# Patient Record
Sex: FEMALE | Race: WHITE | NOT HISPANIC OR LATINO | Employment: UNEMPLOYED | URBAN - METROPOLITAN AREA
[De-identification: names, ages, dates, MRNs, and addresses within clinical notes are randomized per-mention and may not be internally consistent; named-entity substitution may affect disease eponyms.]

---
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
## 2019-01-16 ENCOUNTER — OFFICE VISIT (OUTPATIENT)
Dept: OBGYN CLINIC | Facility: CLINIC | Age: 19
End: 2019-01-16
Payer: COMMERCIAL

## 2019-01-16 VITALS — DIASTOLIC BLOOD PRESSURE: 66 MMHG | SYSTOLIC BLOOD PRESSURE: 118 MMHG | BODY MASS INDEX: 26.19 KG/M2 | WEIGHT: 150.2 LBS

## 2019-01-16 DIAGNOSIS — Z01.419 ENCOUNTER FOR GYNECOLOGICAL EXAMINATION (GENERAL) (ROUTINE) WITHOUT ABNORMAL FINDINGS: Primary | ICD-10-CM

## 2019-01-16 DIAGNOSIS — Z30.011 ORAL CONTRACEPTION INITIATION: ICD-10-CM

## 2019-01-16 PROCEDURE — 99395 PREV VISIT EST AGE 18-39: CPT | Performed by: PHYSICIAN ASSISTANT

## 2019-01-16 RX ORDER — NORETHINDRONE ACETATE AND ETHINYL ESTRADIOL AND FERROUS FUMARATE 1MG-20(24)
1 KIT ORAL DAILY
Qty: 28 TABLET | Refills: 2 | Status: SHIPPED | OUTPATIENT
Start: 2019-01-16 | End: 2019-04-04 | Stop reason: SDUPTHER

## 2019-01-16 NOTE — ASSESSMENT & PLAN NOTE
Annual exam performed  Discussed OCP vs depo vs nuvaring vs nexplanon vs IUD  Pt wants OCP,  Risks, benefits and possible side effects discussed  Instructions reviewed    RTO 3 months for OCP/BP check

## 2019-01-16 NOTE — PROGRESS NOTES
Assessment/Plan  Problem List Items Addressed This Visit     Encounter for gynecological examination (general) (routine) without abnormal findings - Primary     Annual exam performed  Discussed OCP vs depo vs nuvaring vs nexplanon vs IUD  Pt wants OCP,  Risks, benefits and possible side effects discussed  Instructions reviewed  RTO 3 months for OCP/BP check           Other Visit Diagnoses     Oral contraception initiation        Relevant Medications    Norethin Ace-Eth Estrad-FE (MINASTRIN 24 FE) 1-20 MG-MCG(24) CHEW        Subjective   Thuy Woods is a 22 y o  female who presents for a new patient annual GYN exam   Periods are regular every 28-30 days  Dysmenorrhea:severe, occurring throughout menses  She denies intermenstrual bleeding, spotting, or discharge  She reports that she is sexually active with 1 partner and using condoms for contraception  Patient requests OCP to help with dysmenorrhea    Regular self breast exam: no    Family history of uterine or ovarian cancer: no  Family history of breast cancer: yes - MGGM > 50   Family history of colon cancer: no    Menstrual History:  OB History      Para Term  AB Living    0 0 0 0 0 0    SAB TAB Ectopic Multiple Live Births    0 0 0 0 0         Patient's last menstrual period was 01/15/2019  Period Pattern: Regular  Menstrual Flow: Moderate, Heavy    Past Medical History:   Diagnosis Date    Allergic rhinitis     Eczema      History reviewed  No pertinent surgical history  Family History   Problem Relation Age of Onset    No Known Problems Mother     No Known Problems Father     Mental illness Neg Hx     Substance Abuse Neg Hx        Review of Systems  Review of Systems   Constitutional: Negative for chills and fever  Respiratory: Negative for shortness of breath  Cardiovascular: Negative for chest pain  Gastrointestinal: Negative for abdominal pain  Genitourinary: Positive for menstrual problem (see HPI)   Negative for dysuria, pelvic pain, vaginal bleeding, vaginal discharge and vaginal pain  Negative for breast pain or lumps  Negative for stress urinary incontinence  Neurological: Negative for headaches  Objective   /66 (BP Location: Left arm)   Wt 68 1 kg (150 lb 3 2 oz)   LMP 01/15/2019   BMI 26 19 kg/m²     Physical Exam   Constitutional: She is oriented to person, place, and time  She appears well-developed and well-nourished  Neck: No thyromegaly present  Cardiovascular: Normal rate and regular rhythm  Pulmonary/Chest: Effort normal and breath sounds normal  Right breast exhibits no mass, no nipple discharge, no skin change and no tenderness  Left breast exhibits no mass, no nipple discharge, no skin change and no tenderness  Abdominal: Soft  There is no tenderness  There is no rebound and no guarding  Neurological: She is alert and oriented to person, place, and time  Psychiatric: She has a normal mood and affect  Nursing note and vitals reviewed

## 2019-04-03 ENCOUNTER — OFFICE VISIT (OUTPATIENT)
Dept: URGENT CARE | Age: 19
End: 2019-04-03
Payer: COMMERCIAL

## 2019-04-03 VITALS
HEART RATE: 84 BPM | HEIGHT: 63 IN | RESPIRATION RATE: 18 BRPM | DIASTOLIC BLOOD PRESSURE: 73 MMHG | TEMPERATURE: 97.7 F | BODY MASS INDEX: 25.16 KG/M2 | OXYGEN SATURATION: 100 % | SYSTOLIC BLOOD PRESSURE: 115 MMHG | WEIGHT: 142 LBS

## 2019-04-03 DIAGNOSIS — J20.9 ACUTE BRONCHITIS, UNSPECIFIED ORGANISM: ICD-10-CM

## 2019-04-03 DIAGNOSIS — R06.02 SHORTNESS OF BREATH: Primary | ICD-10-CM

## 2019-04-03 PROCEDURE — S9088 SERVICES PROVIDED IN URGENT: HCPCS | Performed by: FAMILY MEDICINE

## 2019-04-03 PROCEDURE — 99213 OFFICE O/P EST LOW 20 MIN: CPT | Performed by: FAMILY MEDICINE

## 2019-04-03 PROCEDURE — 94640 AIRWAY INHALATION TREATMENT: CPT | Performed by: FAMILY MEDICINE

## 2019-04-03 RX ORDER — BENZONATATE 100 MG/1
100 CAPSULE ORAL 3 TIMES DAILY PRN
Qty: 20 CAPSULE | Refills: 0 | Status: SHIPPED | OUTPATIENT
Start: 2019-04-03 | End: 2019-04-18

## 2019-04-03 RX ORDER — IPRATROPIUM BROMIDE AND ALBUTEROL SULFATE 2.5; .5 MG/3ML; MG/3ML
3 SOLUTION RESPIRATORY (INHALATION) ONCE
Status: DISCONTINUED | OUTPATIENT
Start: 2019-04-03 | End: 2019-04-03

## 2019-04-03 RX ORDER — ALBUTEROL SULFATE 2.5 MG/3ML
2.5 SOLUTION RESPIRATORY (INHALATION) ONCE
Status: COMPLETED | OUTPATIENT
Start: 2019-04-03 | End: 2019-04-03

## 2019-04-03 RX ORDER — PROMETHAZINE HYDROCHLORIDE AND CODEINE PHOSPHATE 6.25; 1 MG/5ML; MG/5ML
5 SYRUP ORAL 3 TIMES DAILY PRN
Qty: 60 ML | Refills: 0 | Status: SHIPPED | OUTPATIENT
Start: 2019-04-03 | End: 2019-04-18

## 2019-04-03 RX ADMIN — ALBUTEROL SULFATE 2.5 MG: 2.5 SOLUTION RESPIRATORY (INHALATION) at 17:33

## 2019-04-03 RX ADMIN — Medication 0.5 MG: at 17:33

## 2019-04-04 DIAGNOSIS — Z30.011 ORAL CONTRACEPTION INITIATION: ICD-10-CM

## 2019-04-04 RX ORDER — NORETHINDRONE ACETATE AND ETHINYL ESTRADIOL AND FERROUS FUMARATE 1MG-20(24)
1 KIT ORAL DAILY
Qty: 28 TABLET | Refills: 0 | Status: SHIPPED | OUTPATIENT
Start: 2019-04-04 | End: 2019-04-18 | Stop reason: SDUPTHER

## 2019-04-18 ENCOUNTER — OFFICE VISIT (OUTPATIENT)
Dept: OBGYN CLINIC | Facility: CLINIC | Age: 19
End: 2019-04-18
Payer: COMMERCIAL

## 2019-04-18 VITALS — SYSTOLIC BLOOD PRESSURE: 116 MMHG | DIASTOLIC BLOOD PRESSURE: 70 MMHG | BODY MASS INDEX: 25.51 KG/M2 | WEIGHT: 144 LBS

## 2019-04-18 DIAGNOSIS — Z30.011 ORAL CONTRACEPTION INITIATION: ICD-10-CM

## 2019-04-18 PROCEDURE — 99212 OFFICE O/P EST SF 10 MIN: CPT | Performed by: PHYSICIAN ASSISTANT

## 2019-04-18 RX ORDER — NORETHINDRONE ACETATE AND ETHINYL ESTRADIOL AND FERROUS FUMARATE 1MG-20(24)
1 KIT ORAL DAILY
Qty: 84 TABLET | Refills: 2 | Status: SHIPPED | OUTPATIENT
Start: 2019-04-18 | End: 2019-10-16 | Stop reason: SDUPTHER

## 2019-04-30 ENCOUNTER — OFFICE VISIT (OUTPATIENT)
Dept: URGENT CARE | Age: 19
End: 2019-04-30
Payer: COMMERCIAL

## 2019-04-30 VITALS
DIASTOLIC BLOOD PRESSURE: 77 MMHG | HEART RATE: 90 BPM | RESPIRATION RATE: 20 BRPM | BODY MASS INDEX: 25.52 KG/M2 | HEIGHT: 63 IN | WEIGHT: 144 LBS | TEMPERATURE: 98.3 F | OXYGEN SATURATION: 99 % | SYSTOLIC BLOOD PRESSURE: 122 MMHG

## 2019-04-30 DIAGNOSIS — J20.8 ACUTE BRONCHITIS DUE TO OTHER SPECIFIED ORGANISMS: Primary | ICD-10-CM

## 2019-04-30 PROCEDURE — S9088 SERVICES PROVIDED IN URGENT: HCPCS | Performed by: FAMILY MEDICINE

## 2019-04-30 PROCEDURE — 99213 OFFICE O/P EST LOW 20 MIN: CPT | Performed by: FAMILY MEDICINE

## 2019-04-30 RX ORDER — AZITHROMYCIN 250 MG/1
TABLET, FILM COATED ORAL
Qty: 6 TABLET | Refills: 0 | Status: SHIPPED | OUTPATIENT
Start: 2019-04-30 | End: 2019-05-04

## 2019-08-29 ENCOUNTER — HOSPITAL ENCOUNTER (EMERGENCY)
Facility: HOSPITAL | Age: 19
Discharge: HOME | End: 2019-08-29
Attending: EMERGENCY MEDICINE | Admitting: EMERGENCY MEDICINE
Payer: COMMERCIAL

## 2019-08-29 ENCOUNTER — OFFICE VISIT (OUTPATIENT)
Dept: URGENT CARE | Age: 19
End: 2019-08-29
Payer: COMMERCIAL

## 2019-08-29 VITALS
HEART RATE: 64 BPM | OXYGEN SATURATION: 99 % | DIASTOLIC BLOOD PRESSURE: 52 MMHG | HEIGHT: 63 IN | RESPIRATION RATE: 16 BRPM | BODY MASS INDEX: 23.39 KG/M2 | TEMPERATURE: 98.1 F | SYSTOLIC BLOOD PRESSURE: 96 MMHG | WEIGHT: 132 LBS

## 2019-08-29 VITALS
WEIGHT: 132 LBS | OXYGEN SATURATION: 99 % | BODY MASS INDEX: 23.39 KG/M2 | TEMPERATURE: 97.8 F | RESPIRATION RATE: 20 BRPM | HEART RATE: 77 BPM | HEIGHT: 63 IN | SYSTOLIC BLOOD PRESSURE: 127 MMHG | DIASTOLIC BLOOD PRESSURE: 80 MMHG

## 2019-08-29 DIAGNOSIS — R51.9 NONINTRACTABLE HEADACHE, UNSPECIFIED CHRONICITY PATTERN, UNSPECIFIED HEADACHE TYPE: Primary | ICD-10-CM

## 2019-08-29 LAB
EXT PREG TEST URINE: NEGATIVE
EXT. CONTROL ED NAV: NORMAL

## 2019-08-29 PROCEDURE — 96375 TX/PRO/DX INJ NEW DRUG ADDON: CPT

## 2019-08-29 PROCEDURE — 99283 EMERGENCY DEPT VISIT LOW MDM: CPT

## 2019-08-29 PROCEDURE — 81025 URINE PREGNANCY TEST: CPT | Performed by: EMERGENCY MEDICINE

## 2019-08-29 PROCEDURE — 96365 THER/PROPH/DIAG IV INF INIT: CPT

## 2019-08-29 PROCEDURE — 99284 EMERGENCY DEPT VISIT MOD MDM: CPT | Performed by: EMERGENCY MEDICINE

## 2019-08-29 RX ORDER — MAGNESIUM SULFATE HEPTAHYDRATE 40 MG/ML
2 INJECTION, SOLUTION INTRAVENOUS ONCE
Status: COMPLETED | OUTPATIENT
Start: 2019-08-29 | End: 2019-08-29

## 2019-08-29 RX ORDER — DIPHENHYDRAMINE HYDROCHLORIDE 50 MG/ML
25 INJECTION INTRAMUSCULAR; INTRAVENOUS ONCE
Status: DISCONTINUED | OUTPATIENT
Start: 2019-08-29 | End: 2019-08-29

## 2019-08-29 RX ORDER — KETOROLAC TROMETHAMINE 30 MG/ML
15 INJECTION, SOLUTION INTRAMUSCULAR; INTRAVENOUS ONCE
Status: COMPLETED | OUTPATIENT
Start: 2019-08-29 | End: 2019-08-29

## 2019-08-29 RX ORDER — METOCLOPRAMIDE HYDROCHLORIDE 5 MG/ML
10 INJECTION INTRAMUSCULAR; INTRAVENOUS ONCE
Status: COMPLETED | OUTPATIENT
Start: 2019-08-29 | End: 2019-08-29

## 2019-08-29 RX ORDER — DEXAMETHASONE SODIUM PHOSPHATE 4 MG/ML
10 INJECTION, SOLUTION INTRA-ARTICULAR; INTRALESIONAL; INTRAMUSCULAR; INTRAVENOUS; SOFT TISSUE ONCE
Status: COMPLETED | OUTPATIENT
Start: 2019-08-29 | End: 2019-08-29

## 2019-08-29 RX ADMIN — SODIUM CHLORIDE 1000 ML: 0.9 INJECTION, SOLUTION INTRAVENOUS at 16:39

## 2019-08-29 RX ADMIN — DEXAMETHASONE SODIUM PHOSPHATE 10 MG: 4 INJECTION, SOLUTION INTRAMUSCULAR; INTRAVENOUS at 16:43

## 2019-08-29 RX ADMIN — KETOROLAC TROMETHAMINE 15 MG: 30 INJECTION, SOLUTION INTRAMUSCULAR at 16:39

## 2019-08-29 RX ADMIN — MAGNESIUM SULFATE HEPTAHYDRATE 2 G: 40 INJECTION, SOLUTION INTRAVENOUS at 16:51

## 2019-08-29 RX ADMIN — METOCLOPRAMIDE 10 MG: 5 INJECTION, SOLUTION INTRAMUSCULAR; INTRAVENOUS at 16:45

## 2019-08-29 NOTE — ED ATTENDING ATTESTATION
Julia Castillo DO, saw and evaluated the patient  I have discussed the patient with the resident/non-physician practitioner and agree with the resident's/non-physician practitioner's findings, Plan of Care, and MDM as documented in the resident's/non-physician practitioner's note, except where noted  All available labs and Radiology studies were reviewed  I was present for key portions of any procedure(s) performed by the resident/non-physician practitioner and I was immediately available to provide assistance  At this point I agree with the current assessment done in the Emergency Department  I have conducted an independent evaluation of this patient a history and physical is as follows:    21 yo female presents for evaluation of HA x 1 week, constant but waxes and wanes  Dull, L sided  Denies photophobia  Rated 6/10, no a/e factors  Denies associated f/c/n/v, neck pain/stiffness, visual or speech changes, focal weakness/numbness/tingling  Tried tylenol a couple days ago which didn't help much so she hasn't tried anything else  Neck supple no meningismus  Normal gait  Imp: HA likely migraine variant plan: tx sx, reassess          Critical Care Time  Procedures

## 2019-08-29 NOTE — PROGRESS NOTES
[facility]        NAME: Calvert Lefort is a 22 y o  female  : 2000    MRN: [id number]  DATE: 2019  TIME: 4:09 PM    Assessment and Plan   Nonintractable headache, unspecified chronicity pattern, unspecified headache type [R51]  1  Nonintractable headache, unspecified chronicity pattern, unspecified headache type  Transfer to other facility         Patient Instructions     Referred to ER due to severity and duration of symptoms  Follow up with PCP in 3-5 days  Proceed to  ER if symptoms worsen  Chief Complaint     Chief Complaint   Patient presents with    Headache     PATIENT STATES THIS IT"BEEN FOR 1 WEEK   Nausea    Dizziness         History of Present Illness       Patient presents with complaint of persistent headache x 1 week  Pt reports associated nausea and dizziness  Pt states that she has never had a headache like this in the past and states that she had no relief with ibuprofen  Pt denies fever, chills, night sweats, vomiting, abdominal pain, chest pain, and dyspnea  Pt reports occasional weakness and blurred vision when she feels dizzy  Pt denies any recent head injury but reports having a concussion about a year ago  Review of Systems   Review of Systems   Constitutional: Negative for chills, fatigue and fever  Respiratory: Negative for cough, chest tightness, shortness of breath and wheezing  Cardiovascular: Negative for chest pain and palpitations  Gastrointestinal: Positive for nausea  Musculoskeletal: Negative for myalgias  Skin: Negative for color change, rash and wound  Neurological: Positive for dizziness and headaches  All other systems reviewed and are negative          Current Medications       Current Outpatient Medications:     Norethin Ace-Eth Estrad-FE (MINASTRIN 24 FE) 1-20 MG-MCG(24) CHEW, Chew 1 tablet daily for 28 days, Disp: 84 tablet, Rfl: 2    Current Allergies     Allergies as of 2019    (No Known Allergies) The following portions of the patient's history were reviewed and updated as appropriate: allergies, current medications, past family history, past medical history, past social history, past surgical history and problem list      Past Medical History:   Diagnosis Date    Allergic rhinitis     Eczema        History reviewed  No pertinent surgical history  Family History   Problem Relation Age of Onset    No Known Problems Mother     No Known Problems Father     Mental illness Neg Hx     Substance Abuse Neg Hx          Medications have been verified  Objective   /80 (BP Location: Right arm, Patient Position: Sitting, Cuff Size: Standard)   Pulse 77   Temp 97 8 °F (36 6 °C) (Temporal)   Resp 20   Ht 5' 3" (1 6 m)   Wt 59 9 kg (132 lb)   SpO2 99%   BMI 23 38 kg/m²        Physical Exam     Physical Exam   Constitutional: She is oriented to person, place, and time  She appears well-developed and well-nourished  No distress  HENT:   Head: Normocephalic and atraumatic  Right Ear: External ear normal    Left Ear: External ear normal    Nose: Nose normal    Mouth/Throat: Oropharynx is clear and moist  No oropharyngeal exudate  Eyes: Pupils are equal, round, and reactive to light  Conjunctivae and EOM are normal  Right eye exhibits no discharge  Left eye exhibits no discharge  Neck: Normal range of motion  Neck supple  Cardiovascular: Normal rate, regular rhythm and normal heart sounds  Pulmonary/Chest: Effort normal and breath sounds normal  No respiratory distress  She has no wheezes  Lymphadenopathy:     She has no cervical adenopathy  Neurological: She is alert and oriented to person, place, and time  No cranial nerve deficit or sensory deficit  Coordination normal    Skin: Skin is warm and dry  Capillary refill takes less than 2 seconds  No rash noted  She is not diaphoretic  Psychiatric: She has a normal mood and affect   Her behavior is normal  Thought content normal  Nursing note and vitals reviewed
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
## 2019-09-23 ENCOUNTER — APPOINTMENT (EMERGENCY)
Dept: RADIOLOGY | Facility: HOSPITAL | Age: 19
End: 2019-09-23
Payer: COMMERCIAL

## 2019-09-23 ENCOUNTER — HOSPITAL ENCOUNTER (EMERGENCY)
Facility: HOSPITAL | Age: 19
Discharge: HOME | End: 2019-09-23
Attending: EMERGENCY MEDICINE | Admitting: EMERGENCY MEDICINE
Payer: COMMERCIAL

## 2019-09-23 VITALS
RESPIRATION RATE: 18 BRPM | OXYGEN SATURATION: 98 % | DIASTOLIC BLOOD PRESSURE: 61 MMHG | WEIGHT: 133 LBS | HEART RATE: 62 BPM | SYSTOLIC BLOOD PRESSURE: 119 MMHG | BODY MASS INDEX: 23.56 KG/M2 | TEMPERATURE: 97.8 F

## 2019-09-23 DIAGNOSIS — R10.9 ACUTE LEFT FLANK PAIN: ICD-10-CM

## 2019-09-23 DIAGNOSIS — N20.1 URETEROLITHIASIS: Primary | ICD-10-CM

## 2019-09-23 DIAGNOSIS — N39.0 URINARY TRACT INFECTION: ICD-10-CM

## 2019-09-23 LAB
ANION GAP SERPL CALCULATED.3IONS-SCNC: 4 MMOL/L (ref 4–13)
BACTERIA UR QL AUTO: ABNORMAL /HPF
BASOPHILS # BLD AUTO: 0.03 THOUSANDS/ΜL (ref 0–0.1)
BASOPHILS NFR BLD AUTO: 1 % (ref 0–1)
BILIRUB UR QL STRIP: NEGATIVE
BUN SERPL-MCNC: 6 MG/DL (ref 5–25)
CALCIUM SERPL-MCNC: 8.9 MG/DL (ref 8.3–10.1)
CHLORIDE SERPL-SCNC: 109 MMOL/L (ref 100–108)
CLARITY UR: CLEAR
CO2 SERPL-SCNC: 26 MMOL/L (ref 21–32)
COLOR UR: YELLOW
COLOR, POC: NORMAL
CREAT SERPL-MCNC: 0.82 MG/DL (ref 0.6–1.3)
EOSINOPHIL # BLD AUTO: 0.13 THOUSAND/ΜL (ref 0–0.61)
EOSINOPHIL NFR BLD AUTO: 2 % (ref 0–6)
ERYTHROCYTE [DISTWIDTH] IN BLOOD BY AUTOMATED COUNT: 11.6 % (ref 11.6–15.1)
EXT PREG TEST URINE: NORMAL
EXT. CONTROL ED NAV: NORMAL
GFR SERPL CREATININE-BSD FRML MDRD: 103 ML/MIN/1.73SQ M
GLUCOSE SERPL-MCNC: 76 MG/DL (ref 65–140)
GLUCOSE UR STRIP-MCNC: NEGATIVE MG/DL
HCT VFR BLD AUTO: 36.1 % (ref 34.8–46.1)
HGB BLD-MCNC: 12.1 G/DL (ref 11.5–15.4)
HGB UR QL STRIP.AUTO: ABNORMAL
HYALINE CASTS #/AREA URNS LPF: ABNORMAL /LPF
IMM GRANULOCYTES # BLD AUTO: 0.02 THOUSAND/UL (ref 0–0.2)
IMM GRANULOCYTES NFR BLD AUTO: 0 % (ref 0–2)
KETONES UR STRIP-MCNC: NEGATIVE MG/DL
LEUKOCYTE ESTERASE UR QL STRIP: NEGATIVE
LIPASE SERPL-CCNC: 119 U/L (ref 73–393)
LYMPHOCYTES # BLD AUTO: 2.52 THOUSANDS/ΜL (ref 0.6–4.47)
LYMPHOCYTES NFR BLD AUTO: 39 % (ref 14–44)
MCH RBC QN AUTO: 31.4 PG (ref 26.8–34.3)
MCHC RBC AUTO-ENTMCNC: 33.5 G/DL (ref 31.4–37.4)
MCV RBC AUTO: 94 FL (ref 82–98)
MONOCYTES # BLD AUTO: 0.41 THOUSAND/ΜL (ref 0.17–1.22)
MONOCYTES NFR BLD AUTO: 6 % (ref 4–12)
NEUTROPHILS # BLD AUTO: 3.32 THOUSANDS/ΜL (ref 1.85–7.62)
NEUTS SEG NFR BLD AUTO: 52 % (ref 43–75)
NITRITE UR QL STRIP: NEGATIVE
NON-SQ EPI CELLS URNS QL MICRO: ABNORMAL /HPF
NRBC BLD AUTO-RTO: 0 /100 WBCS
PH UR STRIP.AUTO: 6 [PH] (ref 4.5–8)
PLATELET # BLD AUTO: 215 THOUSANDS/UL (ref 149–390)
PMV BLD AUTO: 9.6 FL (ref 8.9–12.7)
POTASSIUM SERPL-SCNC: 3.7 MMOL/L (ref 3.5–5.3)
PROT UR STRIP-MCNC: NEGATIVE MG/DL
RBC # BLD AUTO: 3.85 MILLION/UL (ref 3.81–5.12)
RBC #/AREA URNS AUTO: ABNORMAL /HPF
SODIUM SERPL-SCNC: 139 MMOL/L (ref 136–145)
SP GR UR STRIP.AUTO: 1.02 (ref 1–1.03)
UROBILINOGEN UR QL STRIP.AUTO: 0.2 E.U./DL
WBC # BLD AUTO: 6.43 THOUSAND/UL (ref 4.31–10.16)
WBC #/AREA URNS AUTO: ABNORMAL /HPF

## 2019-09-23 PROCEDURE — 99284 EMERGENCY DEPT VISIT MOD MDM: CPT | Performed by: EMERGENCY MEDICINE

## 2019-09-23 PROCEDURE — 85025 COMPLETE CBC W/AUTO DIFF WBC: CPT | Performed by: EMERGENCY MEDICINE

## 2019-09-23 PROCEDURE — 36415 COLL VENOUS BLD VENIPUNCTURE: CPT | Performed by: EMERGENCY MEDICINE

## 2019-09-23 PROCEDURE — 83690 ASSAY OF LIPASE: CPT | Performed by: EMERGENCY MEDICINE

## 2019-09-23 PROCEDURE — 81001 URINALYSIS AUTO W/SCOPE: CPT

## 2019-09-23 PROCEDURE — 80048 BASIC METABOLIC PNL TOTAL CA: CPT | Performed by: EMERGENCY MEDICINE

## 2019-09-23 PROCEDURE — 99284 EMERGENCY DEPT VISIT MOD MDM: CPT

## 2019-09-23 PROCEDURE — 74176 CT ABD & PELVIS W/O CONTRAST: CPT

## 2019-09-23 PROCEDURE — 81025 URINE PREGNANCY TEST: CPT | Performed by: EMERGENCY MEDICINE

## 2019-09-23 RX ORDER — CEPHALEXIN 250 MG/1
500 CAPSULE ORAL EVERY 12 HOURS SCHEDULED
Qty: 40 CAPSULE | Refills: 0 | Status: SHIPPED | OUTPATIENT
Start: 2019-09-23 | End: 2019-10-03

## 2019-09-23 NOTE — ED PROVIDER NOTES
History  Chief Complaint   Patient presents with    Flank Pain     pt reports L sided flank pain that strated this morning  pt has not taken meds for relief  pt denies n/v, states she had an episode of diarrhea     79-year-old female with no pertinent past medical history who is presenting with left flank pain  The pain began this morning without a clear precipitating factor  At its onset, the pain was 10/10 in intensity and radiated from the left CVA region into the left side of the abdomen  The pain was severe for couple minutes before becoming more mild  The pain has been present all day  Pain is currently 6/10 in intensity  Pain is aching in character but was sharp this morning  Patient has taken no medications for the pain  She does not note any modifying factors for the pain  Patient reports associated urinary frequency  She had 2 loose stools this morning  She denies any dysuria, hematuria, diarrhea, constipation, blood in the stool, nausea, vomiting, chest pain, shortness of breath, fever, shaking chills, or any other complaints at this time  Patient has no history of abdominal surgeries  She only takes oral contraceptives  Patient reports that her menses have been irregular for the past several months due to recently starting on oral contraceptives  Her last menstrual period was about 2 weeks ago  Patient does not believe that she is pregnant  She denies any abnormal vaginal discharge  No history of STDs  Prior to Admission Medications   Prescriptions Last Dose Informant Patient Reported? Taking? Norethin Ace-Eth Estrad-FE (MINASTRIN 24 FE) 1-20 MG-MCG(24) CHEW   No No   Sig: Chew 1 tablet daily for 28 days      Facility-Administered Medications: None       Past Medical History:   Diagnosis Date    Allergic rhinitis     Eczema        History reviewed  No pertinent surgical history      Family History   Problem Relation Age of Onset    No Known Problems Mother     No Known Problems Father     Mental illness Neg Hx     Substance Abuse Neg Hx      I have reviewed and agree with the history as documented  Social History     Tobacco Use    Smoking status: Never Smoker    Smokeless tobacco: Never Used   Substance Use Topics    Alcohol use: No    Drug use: No        Review of Systems   Constitutional: Negative for diaphoresis, fever and unexpected weight change  HENT: Negative for congestion, rhinorrhea and sore throat  Eyes: Negative for pain, discharge and visual disturbance  Respiratory: Negative for cough, shortness of breath and wheezing  Cardiovascular: Negative for chest pain, palpitations and leg swelling  Gastrointestinal: Negative for abdominal pain, blood in stool, constipation, diarrhea, nausea and vomiting  Genitourinary: Positive for flank pain (left)  Negative for dysuria and hematuria  Musculoskeletal: Negative for arthralgias and joint swelling  Skin: Negative for rash and wound  Allergic/Immunologic: Negative for environmental allergies and food allergies  Neurological: Negative for dizziness, seizures, weakness and numbness  Hematological: Negative for adenopathy  Psychiatric/Behavioral: Negative for confusion and hallucinations  Physical Exam  ED Triage Vitals [09/23/19 1814]   Temperature Pulse Respirations Blood Pressure SpO2   97 8 °F (36 6 °C) 62 18 119/61 98 %      Temp Source Heart Rate Source Patient Position - Orthostatic VS BP Location FiO2 (%)   Oral Monitor Lying Right arm --      Pain Score       6             Orthostatic Vital Signs  Vitals:    09/23/19 1814   BP: 119/61   Pulse: 62   Patient Position - Orthostatic VS: Lying       Physical Exam   Constitutional: She is oriented to person, place, and time  She appears well-developed and well-nourished  No distress  HENT:   Head: Normocephalic and atraumatic     Right Ear: External ear normal    Left Ear: External ear normal    Eyes: Pupils are equal, round, and reactive to light  Conjunctivae and EOM are normal    Neck: Normal range of motion  Neck supple  Cardiovascular: Normal rate, regular rhythm and normal heart sounds  No murmur heard  Pulmonary/Chest: Effort normal and breath sounds normal  No respiratory distress  She has no wheezes  She has no rales  Abdominal: Soft  Bowel sounds are normal  She exhibits no distension  There is no tenderness  There is no guarding  Patient has left-sided CVA tenderness but abdomen is otherwise nontender  No guarding or peritoneal signs  Musculoskeletal: Normal range of motion  She exhibits no deformity  Neurological: She is alert and oriented to person, place, and time  No gross motor deficits noted  Cranial nerves II-XII are intact  Speech is fluent without dysarthria or aphasia  Skin: Skin is warm and dry  Psychiatric: She has a normal mood and affect  Her behavior is normal  Thought content normal    Nursing note and vitals reviewed        ED Medications  Medications - No data to display    Diagnostic Studies  Results Reviewed     Procedure Component Value Units Date/Time    Urine Microscopic [[id number]]  (Abnormal) Collected:  09/23/19 1916    Lab Status:  Final result Specimen:  Urine, Clean Catch Updated:  09/23/19 1948     RBC, UA None Seen /hpf      WBC, UA 4-10 /hpf      Epithelial Cells None Seen /hpf      Bacteria, UA Occasional /hpf      Hyaline Casts, UA 3-5 /lpf     POCT urinalysis dipstick [[id number]]  (Normal) Resulted:  09/23/19 1917    Lab Status:  Final result Updated:  09/23/19 1917     Color, UA yellow    POCT pregnancy, urine [[id number]]  (Normal) Resulted:  09/23/19 1917    Lab Status:  Final result Updated:  09/23/19 1917     EXT PREG TEST UR (Ref: Negative) neg     Control valid    ED Urine Macroscopic [[id number]]  (Abnormal) Collected:  09/23/19 1916    Lab Status:  Final result Specimen:  Urine Updated:  09/23/19 1915     Color, UA Yellow     Clarity, UA Clear     pH, UA 6 0 Leukocytes, UA Negative     Nitrite, UA Negative     Protein, UA Negative mg/dl      Glucose, UA Negative mg/dl      Ketones, UA Negative mg/dl      Urobilinogen, UA 0 2 E U /dl      Bilirubin, UA Negative     Blood, UA Trace     Specific [place], UA 1 025    Narrative:       CLINITEK RESULT    Basic metabolic panel [[id number]]  (Abnormal) Collected:  09/23/19 1834    Lab Status:  Final result Specimen:  Blood from Arm, Right Updated:  09/23/19 1859     Sodium 139 mmol/L      Potassium 3 7 mmol/L      Chloride 109 mmol/L      CO2 26 mmol/L      ANION GAP 4 mmol/L      BUN 6 mg/dL      Creatinine 0 82 mg/dL      Glucose 76 mg/dL      Calcium 8 9 mg/dL      eGFR 103 ml/min/1 73sq m     Narrative:       [place] guidelines for Chronic Kidney Disease (CKD):     Stage 1 with normal or high GFR (GFR > 90 mL/min/1 73 square meters)    Stage 2 Mild CKD (GFR = 60-89 mL/min/1 73 square meters)    Stage 3A Moderate CKD (GFR = 45-59 mL/min/1 73 square meters)    Stage 3B Moderate CKD (GFR = 30-44 mL/min/1 73 square meters)    Stage 4 Severe CKD (GFR = 15-29 mL/min/1 73 square meters)    Stage 5 End Stage CKD (GFR <15 mL/min/1 73 square meters)  Note: GFR calculation is accurate only with a steady state creatinine    Lipase [[id number]]  (Normal) Collected:  09/23/19 1834    Lab Status:  Final result Specimen:  Blood from Arm, Right Updated:  09/23/19 1859     Lipase 119 u/L     CBC and differential [[id number]] Collected:  09/23/19 1834    Lab Status:  Final result Specimen:  Blood from Arm, Right Updated:  09/23/19 1846     WBC 6 43 Thousand/uL      RBC 3 85 Million/uL      Hemoglobin 12 1 g/dL      Hematocrit 36 1 %      MCV 94 fL      MCH 31 4 pg      MCHC 33 5 g/dL      RDW 11 6 %      MPV 9 6 fL      Platelets 865 Thousands/uL      nRBC 0 /100 WBCs      Neutrophils Relative 52 %      Immat GRANS % 0 %      Lymphocytes Relative 39 %      Monocytes Relative 6 %      Eosinophils Relative 2 % Basophils Relative 1 %      Neutrophils Absolute 3 32 Thousands/µL      Immature Grans Absolute 0 02 Thousand/uL      Lymphocytes Absolute 2 52 Thousands/µL      Monocytes Absolute 0 41 Thousand/µL      Eosinophils Absolute 0 13 Thousand/µL      Basophils Absolute 0 03 Thousands/µL                  CT renal stone study abdomen pelvis without contrast   Final Result by Kim Lindo MD (09/23 2009)         1  Punctate sub-1 mm calculus in the region of the left ureterovesical junction with mild fullness within the renal pelvis  2   Punctate 1 mm nonobstructing calculus within the lower pole of the right kidney  Workstation performed: [id number]               Procedures  Procedures        ED Course  ED Course as of Sep 23 2238   Mon Sep 23, 2019   1819 Patient declined analgesics at this time  I advised her that if her symptoms worsen, we would be happy to provide her with analgesics  Patient verbalized understanding  1917 PREGNANCY TEST URINE: neg   1917 Blood, UA(!): Trace   1952 WBC, UA(!): 4-10   1952 Bacteria, UA: Occasional   2012 There is a punctate, less than 1 mm calculus at the left UVJ  CT renal stone study abdomen pelvis without contrast                               MDM  Number of Diagnoses or Management Options  Acute left flank pain: new and requires workup  Ureterolithiasis: new and requires workup  Urinary tract infection: new and does not require workup  Diagnosis management comments:     As above, patient presented with left flank pain which was acute in onset  It was initially severe but became mild after only a couple minutes  It was persistent throughout the day  Vital signs were within normal limits  On examination, the patient had mild left CVA tenderness  Differential diagnosis included but was not limited to nephrolithiasis, pyelonephritis, and musculoskeletal etiology  Labs ordered as above  No significant abnormalities were demonstrated    Urinalysis demonstrated trace blood with 4-10 WBCs per high-power field with occasional bacteria  Due to history suggestive of possible nephrolithiasis, we ordered a CT renal stone protocol which demonstrated a punctate, less than 1 millimeter calculus at the left UVJ with mild right hydronephrosis  We treated the patient with Keflex due to possible concurrent urinary tract infection  Patient was advised to follow up with Urology  Return precautions were discussed  Patient verbalized understanding  Amount and/or Complexity of Data Reviewed  Clinical lab tests: ordered and reviewed  Tests in the radiology section of CPT®: ordered and reviewed  Decide to obtain previous medical records or to obtain history from someone other than the patient: yes  Review and summarize past medical records: yes  Independent visualization of images, tracings, or specimens: yes    Risk of Complications, Morbidity, and/or Mortality  Presenting problems: moderate  Diagnostic procedures: minimal  Management options: minimal    Patient Progress  Patient progress: improved      Disposition  Final diagnoses:   Ureterolithiasis   Acute left flank pain   Urinary tract infection     Time reflects when diagnosis was documented in both MDM as applicable and the Disposition within this note     Time User Action Codes Description Comment    9/23/2019  8:21 PM Gin Robertson Add [N20 1] Ureterolithiasis     9/23/2019  8:21 PM Gin Clarker Add [R10 9] Acute left flank pain     9/23/2019  8:21 PM Gin Robertson Add [N39 0] Urinary tract infection       ED Disposition     ED Disposition Condition Date/Time Comment    Discharge Good Mon Sep 23, 2019  8:21 PM Miguel Mckeon discharge to home/self care  Follow-up Information     Follow up With Specialties Details Why [address] [facility] Call  As needed   Betsey Campa[address]  [address] [place] [address] [address]       [address] [facility] Emergency Department Emergency Medicine Go to  If symptoms worsen  [address]  ED, [address], [place], [place], [address]  For Urology [facility] Call  As needed, for follow-up  [address] [facility] [address] [facility] [id number]  [address]  [facility] [place], [address] [place], [place], [place], [address]          Discharge Medication List as of 9/23/2019  8:23 PM      START taking these medications    Details   cephalexin (KEFLEX) 250 mg capsule Take 2 capsules (500 mg total) by mouth every 12 (twelve) hours for 10 days, Starting Mon 9/23/2019, Until Thu 10/3/2019, Print         CONTINUE these medications which have NOT CHANGED    Details   Norethin Ace-Eth Estrad-FE (MINASTRIN 24 FE) 1-20 MG-MCG(24) CHEW Chew 1 tablet daily for 28 days, Starting Thu 4/18/2019, Until Thu 5/16/2019, Normal           No discharge procedures on file  ED Provider  Attending physically available and evaluated Stefanie Puente I managed the patient along with the ED Attending      Electronically Signed by         Vanessa Wolf MD  09/23/19 3427

## 2019-09-24 NOTE — ED ATTENDING ATTESTATION
I,Vikram Mills MD, saw and evaluated the patient  I have discussed the patient with the resident/non-physician practitioner and agree with the resident's/non-physician practitioner's findings, Plan of Care, and MDM as documented in the resident's/non-physician practitioner's note, except where noted  All available labs and Radiology studies were reviewed  I was present for key portions of any procedure(s) performed by the resident/non-physician practitioner and I was immediately available to provide assistance  At this point I agree with the current assessment done in the Emergency Department  I have conducted an independent evaluation of this patient including a focused history and a physical exam         76-year-old female, strong family history of kidney stone disease, presenting to the emergency department for evaluation of left flank pain  Flank pain began this morning, has had intermittent episodes where it has gotten worse, with periods of time works better  Patient has associated urgency without frequency or dysuria  No hematuria  No nausea or vomiting  Ten systems reviewed negative except as noted  The patient is resting comfortably on a stretcher in no acute respiratory distress  The patient appears nontoxic  HEENT reveals moist mucous membranes  Head is normocephalic and atraumatic  Conjunctiva and sclera are normal  Neck is nontender and supple with full range of motion to flexion, extension, lateral rotation  No meningismus appreciated  No masses are appreciated  Lungs are clear to auscultation bilaterally without any wheezes, rales or rhonchi  Heart is regular rate and rhythm without any murmurs, rubs or gallops  Abdomen is soft and nontender without any rebound or guarding  Mild left CVA tenderness on exam   Extremities appear grossly normal without any significant arthropathy  Patient is awake, alert, and oriented x3  The patient has normal interaction   Motor is 5 out of 5         Assessment and plan:  26-year-old female presenting with intermittent left flank pain  Patient will be evaluated for renal stone disease  CT renal stone study abdomen pelvis without contrast   Final Result         1  Punctate sub-1 mm calculus in the region of the left ureterovesical junction with mild fullness within the renal pelvis  2   Punctate 1 mm nonobstructing calculus within the lower pole of the right kidney              Workstation performed: [id number]

## 2019-09-24 NOTE — DISCHARGE INSTRUCTIONS
Please take antibiotics as prescribed for possible urine infection  You may use Tylenol and ibuprofen as needed for flank pain  Follow up with Urology as needed  Return to the ER with uncontrolled pain, intractable nausea and vomiting, fever, shaking chills, or any other symptoms that concern you

## 2019-09-27 ENCOUNTER — OFFICE VISIT (OUTPATIENT)
Dept: UROLOGY | Facility: AMBULATORY SURGERY CENTER | Age: 19
End: 2019-09-27
Payer: COMMERCIAL

## 2019-09-27 VITALS
HEIGHT: 63 IN | SYSTOLIC BLOOD PRESSURE: 118 MMHG | DIASTOLIC BLOOD PRESSURE: 78 MMHG | BODY MASS INDEX: 23.74 KG/M2 | WEIGHT: 134 LBS

## 2019-09-27 DIAGNOSIS — N20.0 CALCULUS OF KIDNEY: Primary | ICD-10-CM

## 2019-09-27 PROCEDURE — 99244 OFF/OP CNSLTJ NEW/EST MOD 40: CPT | Performed by: UROLOGY

## 2019-09-27 NOTE — PROGRESS NOTES
9/27/2019    Socorro Ameerbeg  2000  [id number]        Assessment  1 mm nonobstructing right lower pole renal calculus, 1 mm distal left ureteral calculus with mild left-sided hydronephrosis      Discussion  As the patient is asymptomatic at this time it is likely that she has passed the small stone  I provided the patient with reassurance that it is highly unlikely that she would require surgical intervention for 1 mm stone  I stressed the importance of hydration and diet modification  Because she did have mild fullness of the left collecting system present on CT scan, I recommend obtaining a retroperitoneal ultrasound of the kidneys and bladder in the next 6 weeks to ensure resolution of the left-sided hydronephrosis  The patient will call to arrange this imaging study  Assuming that this is within normal limits follow-up can be in 1 year or sooner if she were to have recurrent flank pain  I recommend discontinuing her Keflex at this time  History of Present Illness  20 y o  female with a history of new onset left flank pain approximately 4 days ago  She was seen in the emergency room  A CT stone study showed a 1 mm nonobstructing right lower pole calculus as well as a questionable distal 1 mm left ureteral calculus with mild left-sided fullness versus hydronephrosis  She denies any nausea, vomiting, fever, chills, or lower urinary tract symptoms  In the emergency room a urinalysis revealed white cells and bacteria but no nitrites or leukocytes  She was treated with a short course of Keflex  There was no urine culture sent from the emergency room  She denies any prior history of nephrolithiasis  There is a subtle family history of nephrolithiasis  She denies seeing gross hematuria  She is asymptomatic at this time  AUA Symptom Score      Review of Systems  Review of Systems   Constitutional: Negative  HENT: Negative  Eyes: Negative  Respiratory: Negative      Cardiovascular: Negative  Gastrointestinal: Negative  Endocrine: Negative  Genitourinary: Negative  Musculoskeletal: Negative  Skin: Negative  Allergic/Immunologic: Negative  Neurological: Negative  Hematological: Negative  Psychiatric/Behavioral: Negative  Past Medical History  Past Medical History:   Diagnosis Date    Allergic rhinitis     Eczema     Kidney stone        Past Social History  History reviewed  No pertinent surgical history      Past Family History  Family History   Problem Relation Age of Onset    No Known Problems Mother     No Known Problems Father     Mental illness Neg Hx     Substance Abuse Neg Hx        Past Social history  Social History     Socioeconomic History    Marital status: Single     Spouse name: Not on file    Number of children: Not on file    Years of education: Not on file    Highest education level: Not on file   Occupational History    Not on file   Social Needs    Financial resource strain: Not on file    Food insecurity:     Worry: Not on file     Inability: Not on file    Transportation needs:     Medical: Not on file     Non-medical: Not on file   Tobacco Use    Smoking status: Never Smoker    Smokeless tobacco: Never Used   Substance and Sexual Activity    Alcohol use: No    Drug use: No    Sexual activity: Yes     Partners: Male     Birth control/protection: None, OCP   Lifestyle    Physical activity:     Days per week: Not on file     Minutes per session: Not on file    Stress: Not on file   Relationships    Social connections:     Talks on phone: Not on file     Gets together: Not on file     Attends [employer] service: Not on file     Active member of club or organization: Not on file     Attends meetings of clubs or organizations: Not on file     Relationship status: Not on file    Intimate partner violence:     Fear of current or ex partner: Not on file     Emotionally abused: Not on file     Physically abused: Not on file Forced sexual activity: Not on file   Other Topics Concern    Not on file   Social History Narrative    Not on file       Current Medications  Current Outpatient Medications   Medication Sig Dispense Refill    cephalexin (KEFLEX) 250 mg capsule Take 2 capsules (500 mg total) by mouth every 12 (twelve) hours for 10 days 40 capsule 0    Norethin Ace-Eth Estrad-FE (MINASTRIN 24 FE) 1-20 MG-MCG(24) CHEW Chew 1 tablet daily for 28 days 84 tablet 2     No current facility-administered medications for this visit  Allergies  No Known Allergies    Past Medical History, Social History, Family History, medications and allergies were reviewed  Vitals  Vitals:    09/27/19 1602   BP: 118/78   BP Location: Left arm   Patient Position: Sitting   Cuff Size: Adult   Weight: 60 8 kg (134 lb)   Height: 5' 3" (1 6 m)       Physical Exam  Physical Exam    On examination she is in no acute distress  Her abdomen is soft nontender nondistended   examination reveals no CVA tenderness  Skin is warm  Extremities without edema    Neurologic is grossly intact and nonfocal   Gait normal   Affect normal    Results  No results found for: PSA  Lab Results   Component Value Date    CALCIUM 8 9 09/23/2019    K 3 7 09/23/2019    CO2 26 09/23/2019     (H) 09/23/2019    BUN 6 09/23/2019    CREATININE 0 82 09/23/2019     Lab Results   Component Value Date    WBC 6 43 09/23/2019    HGB 12 1 09/23/2019    HCT 36 1 09/23/2019    MCV 94 09/23/2019     09/23/2019         Office Urine Dip  No results found for this or any previous visit (from the past 1 hour(s)) ]

## 2019-09-27 NOTE — LETTER
September 27, 2019     Margot Agarwal [address]  [address] [address]    Patient: Lesley Byrne   YOB: 2000   Date of Visit: 9/27/2019       Dear Dr Keara Jeffries: Thank you for referring Lesley Byrne to me for evaluation  Below are my notes for this consultation  If you have questions, please do not hesitate to call me  I look forward to following your patient along with you  Sincerely,        Clif Alatorre MD        CC: No Recipients  Clif Alatorre MD  9/27/2019  4:56 PM  Sign at close encounter  9/27/2019    Lesley Byrne  2000  [id number]        Assessment  1 mm nonobstructing right lower pole renal calculus, 1 mm distal left ureteral calculus with mild left-sided hydronephrosis      Discussion  As the patient is asymptomatic at this time it is likely that she has passed the small stone  I provided the patient with reassurance that it is highly unlikely that she would require surgical intervention for 1 mm stone  I stressed the importance of hydration and diet modification  Because she did have mild fullness of the left collecting system present on CT scan, I recommend obtaining a retroperitoneal ultrasound of the kidneys and bladder in the next 6 weeks to ensure resolution of the left-sided hydronephrosis  The patient will call to arrange this imaging study  Assuming that this is within normal limits follow-up can be in 1 year or sooner if she were to have recurrent flank pain  I recommend discontinuing her Keflex at this time  History of Present Illness  20 y o  female with a history of new onset left flank pain approximately 4 days ago  She was seen in the emergency room  A CT stone study showed a 1 mm nonobstructing right lower pole calculus as well as a questionable distal 1 mm left ureteral calculus with mild left-sided fullness versus hydronephrosis  She denies any nausea, vomiting, fever, chills, or lower urinary tract symptoms    In the emergency room a urinalysis revealed white cells and bacteria but no nitrites or leukocytes  She was treated with a short course of Keflex  There was no urine culture sent from the emergency room  She denies any prior history of nephrolithiasis  There is a subtle family history of nephrolithiasis  She denies seeing gross hematuria  She is asymptomatic at this time  AUA Symptom Score      Review of Systems  Review of Systems   Constitutional: Negative  HENT: Negative  Eyes: Negative  Respiratory: Negative  Cardiovascular: Negative  Gastrointestinal: Negative  Endocrine: Negative  Genitourinary: Negative  Musculoskeletal: Negative  Skin: Negative  Allergic/Immunologic: Negative  Neurological: Negative  Hematological: Negative  Psychiatric/Behavioral: Negative  Past Medical History  Past Medical History:   Diagnosis Date    Allergic rhinitis     Eczema     Kidney stone        Past Social History  History reviewed  No pertinent surgical history      Past Family History  Family History   Problem Relation Age of Onset    No Known Problems Mother     No Known Problems Father     Mental illness Neg Hx     Substance Abuse Neg Hx        Past Social history  Social History     Socioeconomic History    Marital status: Single     Spouse name: Not on file    Number of children: Not on file    Years of education: Not on file    Highest education level: Not on file   Occupational History    Not on file   Social Needs    Financial resource strain: Not on file    Food insecurity:     Worry: Not on file     Inability: Not on file    Transportation needs:     Medical: Not on file     Non-medical: Not on file   Tobacco Use    Smoking status: Never Smoker    Smokeless tobacco: Never Used   Substance and Sexual Activity    Alcohol use: No    Drug use: No    Sexual activity: Yes     Partners: Male     Birth control/protection: None, OCP   Lifestyle    Physical activity:     Days per week: Not on file     Minutes per session: Not on file    Stress: Not on file   Relationships    Social connections:     Talks on phone: Not on file     Gets together: Not on file     Attends Rastafari service: Not on file     Active member of club or organization: Not on file     Attends meetings of clubs or organizations: Not on file     Relationship status: Not on file    Intimate partner violence:     Fear of current or ex partner: Not on file     Emotionally abused: Not on file     Physically abused: Not on file     Forced sexual activity: Not on file   Other Topics Concern    Not on file   Social History Narrative    Not on file       Current Medications  Current Outpatient Medications   Medication Sig Dispense Refill    cephalexin (KEFLEX) 250 mg capsule Take 2 capsules (500 mg total) by mouth every 12 (twelve) hours for 10 days 40 capsule 0    Norethin Ace-Eth Estrad-FE (MINASTRIN 24 FE) 1-20 MG-MCG(24) CHEW Chew 1 tablet daily for 28 days 84 tablet 2     No current facility-administered medications for this visit  Allergies  No Known Allergies    Past Medical History, Social History, Family History, medications and allergies were reviewed  Vitals  Vitals:    09/27/19 1602   BP: 118/78   BP Location: Left arm   Patient Position: Sitting   Cuff Size: Adult   Weight: 60 8 kg (134 lb)   Height: 5' 3" (1 6 m)       Physical Exam  Physical Exam    On examination she is in no acute distress  Her abdomen is soft nontender nondistended   examination reveals no CVA tenderness  Skin is warm  Extremities without edema    Neurologic is grossly intact and nonfocal   Gait normal   Affect normal    Results  No results found for: PSA  Lab Results   Component Value Date    CALCIUM 8 9 09/23/2019    K 3 7 09/23/2019    CO2 26 09/23/2019     (H) 09/23/2019    BUN 6 09/23/2019    CREATININE 0 82 09/23/2019     Lab Results   Component Value Date    WBC 6 43 09/23/2019 HGB 12 1 09/23/2019    HCT 36 1 09/23/2019    MCV 94 09/23/2019     09/23/2019         Office Urine Dip  No results found for this or any previous visit (from the past 1 hour(s)) ]

## 2019-10-16 DIAGNOSIS — Z30.011 ORAL CONTRACEPTION INITIATION: ICD-10-CM

## 2019-10-17 RX ORDER — NORETHINDRONE ACETATE AND ETHINYL ESTRADIOL AND FERROUS FUMARATE 1MG-20(24)
1 KIT ORAL DAILY
Qty: 84 TABLET | Refills: 2 | Status: SHIPPED | OUTPATIENT
Start: 2019-10-17 | End: 2020-01-13 | Stop reason: SDUPTHER

## 2019-11-22 ENCOUNTER — APPOINTMENT (EMERGENCY)
Dept: RADIOLOGY | Facility: HOSPITAL | Age: 19
End: 2019-11-22
Payer: COMMERCIAL

## 2019-11-22 ENCOUNTER — HOSPITAL ENCOUNTER (EMERGENCY)
Facility: HOSPITAL | Age: 19
Discharge: HOME | End: 2019-11-22
Attending: EMERGENCY MEDICINE | Admitting: EMERGENCY MEDICINE
Payer: COMMERCIAL

## 2019-11-22 VITALS
BODY MASS INDEX: 23.91 KG/M2 | SYSTOLIC BLOOD PRESSURE: 117 MMHG | DIASTOLIC BLOOD PRESSURE: 59 MMHG | OXYGEN SATURATION: 98 % | RESPIRATION RATE: 18 BRPM | WEIGHT: 135 LBS | HEART RATE: 62 BPM | TEMPERATURE: 98.3 F

## 2019-11-22 DIAGNOSIS — R07.89 CHEST WALL PAIN: Primary | ICD-10-CM

## 2019-11-22 LAB
ATRIAL RATE: 56 BPM
BACTERIA UR QL AUTO: ABNORMAL /HPF
BILIRUB UR QL STRIP: NEGATIVE
CLARITY UR: CLEAR
COLOR UR: YELLOW
COLOR, POC: NORMAL
GLUCOSE UR STRIP-MCNC: NEGATIVE MG/DL
HGB UR QL STRIP.AUTO: NEGATIVE
HYALINE CASTS #/AREA URNS LPF: ABNORMAL /LPF
KETONES UR STRIP-MCNC: NEGATIVE MG/DL
LEUKOCYTE ESTERASE UR QL STRIP: ABNORMAL
NITRITE UR QL STRIP: NEGATIVE
NON-SQ EPI CELLS URNS QL MICRO: ABNORMAL /HPF
P AXIS: 40 DEGREES
PH UR STRIP.AUTO: 6.5 [PH] (ref 4.5–8)
PR INTERVAL: 140 MS
PROT UR STRIP-MCNC: NEGATIVE MG/DL
QRS AXIS: 53 DEGREES
QRSD INTERVAL: 70 MS
QT INTERVAL: 416 MS
QTC INTERVAL: 401 MS
RBC #/AREA URNS AUTO: ABNORMAL /HPF
SP GR UR STRIP.AUTO: 1.02 (ref 1–1.03)
T WAVE AXIS: 54 DEGREES
UROBILINOGEN UR QL STRIP.AUTO: 0.2 E.U./DL
VENTRICULAR RATE: 56 BPM
WBC #/AREA URNS AUTO: ABNORMAL /HPF

## 2019-11-22 PROCEDURE — 99284 EMERGENCY DEPT VISIT MOD MDM: CPT | Performed by: EMERGENCY MEDICINE

## 2019-11-22 PROCEDURE — 93005 ELECTROCARDIOGRAM TRACING: CPT

## 2019-11-22 PROCEDURE — 99285 EMERGENCY DEPT VISIT HI MDM: CPT

## 2019-11-22 PROCEDURE — 81001 URINALYSIS AUTO W/SCOPE: CPT

## 2019-11-22 PROCEDURE — 93010 ELECTROCARDIOGRAM REPORT: CPT | Performed by: INTERNAL MEDICINE

## 2019-11-22 PROCEDURE — 71046 X-RAY EXAM CHEST 2 VIEWS: CPT

## 2019-11-22 RX ORDER — FLUTICASONE PROPIONATE 50 MCG
1 SPRAY, SUSPENSION (ML) NASAL ONCE
Status: COMPLETED | OUTPATIENT
Start: 2019-11-22 | End: 2019-11-22

## 2019-11-22 RX ORDER — ALBUTEROL SULFATE 90 UG/1
2 AEROSOL, METERED RESPIRATORY (INHALATION) ONCE
Status: COMPLETED | OUTPATIENT
Start: 2019-11-22 | End: 2019-11-22

## 2019-11-22 RX ADMIN — FLUTICASONE PROPIONATE 1 SPRAY: 50 SPRAY, METERED NASAL at 07:58

## 2019-11-22 RX ADMIN — ALBUTEROL SULFATE 2 PUFF: 90 AEROSOL, METERED RESPIRATORY (INHALATION) at 07:57

## 2019-11-22 NOTE — ED PROVIDER NOTES
History  Chief Complaint   Patient presents with    Chest Pain     Pt c/o CP in middle of chest and SOB that started around midnight last night  HPI     This is a 50-year-old female with shortness of breath  Pain she describes as reproducible on palpation, retrosternal, sharp  Pain is intermittent  She says it is worse with pressing  Never smoked  No vomiting today  Denies radiation to arms or back, diaphoresis, nausea/vomiting, numbness/weakness  Denies history of myocardial infarction  Denies family history of myocardial infarctions  Denies history of hypertension, hyperlipidemia, diabetes, tobacco use  Denies history of DVT/PE, unilateral calf pain/swelling, hemoptysis, recent travel, recent surgery/trauma, cancer/cancer treatment  She is on birth control with estrogen  On ROS endorses a congestion  Occasional non productive  Cough makes chest pain worse  States she does have an inhaler from when she was previously sick  Did not try it recently  No history of asthma  Prior to Admission Medications   Prescriptions Last Dose Informant Patient Reported? Taking? Norethin Ace-Eth Estrad-FE (MINASTRIN 24 FE) 1-20 MG-MCG(24) CHEW   No No   Sig: Chew 1 tablet daily for 28 days      Facility-Administered Medications: None       Past Medical History:   Diagnosis Date    Allergic rhinitis     Eczema     Kidney stone        History reviewed  No pertinent surgical history  Family History   Problem Relation Age of Onset    No Known Problems Mother     No Known Problems Father     Mental illness Neg Hx     Substance Abuse Neg Hx      I have reviewed and agree with the history as documented  Social History     Tobacco Use    Smoking status: Never Smoker    Smokeless tobacco: Never Used   Substance Use Topics    Alcohol use: No    Drug use: No        Review of Systems   Constitutional: Negative for chills, fatigue and fever  HENT: Negative for sore throat      Eyes: Negative for redness and visual disturbance  Respiratory: Positive for cough and shortness of breath  Cardiovascular: Positive for chest pain  Gastrointestinal: Negative for abdominal pain, diarrhea and nausea  Genitourinary: Negative for difficulty urinating, dysuria and pelvic pain  Musculoskeletal: Negative for back pain  Skin: Negative for rash  Neurological: Negative for syncope, weakness and headaches  All other systems reviewed and are negative  Physical Exam  ED Triage Vitals   Temperature Pulse Respirations Blood Pressure SpO2   11/22/19 0633 11/22/19 0631 11/22/19 0631 11/22/19 0631 11/22/19 0631   98 3 °F (36 8 °C) 67 16 130/82 100 %      Temp src Heart Rate Source Patient Position - Orthostatic VS BP Location FiO2 (%)   -- 11/22/19 0810 11/22/19 0810 11/22/19 0810 --    Monitor Sitting Left arm       Pain Score       11/22/19 0631       7             Orthostatic Vital Signs  Vitals:    11/22/19 0631 11/22/19 0810 11/22/19 0830   BP: 130/82  117/59   Pulse: 67 69 62   Patient Position - Orthostatic VS:  Sitting Lying       Physical Exam   Constitutional: She is oriented to person, place, and time  She appears well-developed and well-nourished  No distress  HENT:   Head: Normocephalic and atraumatic  Eyes: Conjunctivae are normal    Neck: Normal range of motion  Cardiovascular: Normal rate, regular rhythm and normal heart sounds  No murmur heard  Chest pain reproducible on palpation   Pulmonary/Chest: Effort normal and breath sounds normal  No respiratory distress  Abdominal: Soft  Bowel sounds are normal  There is no tenderness  Musculoskeletal: Normal range of motion  Neurological: She is alert and oriented to person, place, and time  No cranial nerve deficit or sensory deficit  She exhibits normal muscle tone  Coordination normal    Skin: Skin is warm and dry  No rash noted  Psychiatric: She has a normal mood and affect  Nursing note and vitals reviewed        ED Medications  Medications fluticasone (FLONASE) 50 mcg/act nasal spray 1 spray (1 spray Each Nare Given 11/22/19 0758)   albuterol (PROVENTIL HFA,VENTOLIN HFA) inhaler 2 puff (2 puffs Inhalation Given 11/22/19 0757)       Diagnostic Studies  Results Reviewed     Procedure Component Value Units Date/Time    Urine Microscopic [[id number]]  (Abnormal) Collected:  11/22/19 0751    Lab Status:  Final result Specimen:  Urine, Clean Catch Updated:  11/22/19 0848     RBC, UA None Seen /hpf      WBC, UA 4-10 /hpf      Epithelial Cells Occasional /hpf      Bacteria, UA Occasional /hpf      Hyaline Casts, UA 3-5 /lpf     POCT urinalysis dipstick [[id number]]  (Normal) Resulted:  11/22/19 0753    Lab Status:  Final result Specimen:  Urine Updated:  11/22/19 0754     Color, UA see results    Urine Macroscopic, POC [[id number]]  (Abnormal) Collected:  11/22/19 0751    Lab Status:  Final result Specimen:  Urine Updated:  11/22/19 0752     Color, UA Yellow     Clarity, UA Clear     pH, UA 6 5     Leukocytes, UA Trace     Nitrite, UA Negative     Protein, UA Negative mg/dl      Glucose, UA Negative mg/dl      Ketones, UA Negative mg/dl      Urobilinogen, UA 0 2 E U /dl      Bilirubin, UA Negative     Blood, UA Negative     Specific Gravity, UA 1 020    Narrative:       CLIN[facility]K RESULT                 XR chest 2 views   Final Result by Yvonne Ackerman MD (11/22 5874)   Mild increased density seen right lower lung may represent infiltrate or summation shadow   The study was marked in EPIC for significant notification  Workstation performed: [id number]               Procedures  Procedures        ED Course         MDM     48-year-old female who presents to the ED for evaluation of chest pain  The patient's symptoms are likely secondary to URI, she was given albuterol, as well as Flonase for her symptoms here    The patient's chest x-ray showed possible infiltrate, given that her lungs are clear on exam, no fevers, could be possible viral pneumonia, versus artifact, and her EKG showed no acute findings, normal sinus rhythm  The patient had symptomatic relief, was discharged with PCP follow-up in re-evaluation  The patient was informed of her for chest x-ray results, and was informed to follow up with her primary care physician for possible repeat chest x-ray  The patient was instructed to follow up as documented  Strict return precautions were discussed with the patient and the patient was instructed to return to the emergency department immediately if symptoms worsen  The patient/patient family member acknowledged and were in agreement with plan  Disposition  Final diagnoses:   Chest wall pain     Time reflects when diagnosis was documented in both MDM as applicable and the Disposition within this note     Time User Action Codes Description Comment    11/22/2019  8:45 AM Bryan Pendleton [R07 [id number]] Chest wall pain       ED Disposition     ED Disposition Condition Date/Time Comment    Discharge Stable Fri Nov 22, 2019  8:45 AM Tyron Rodrigues discharge to home/self care  Follow-up Information     Follow up With Specialties Details Why Contact Info Additional [address] [facility] Call  Call to establish care and follow up for abnormal chest xray  [address] [facility] [id number]  [address] [place] [address], [address] [place] [address], [facility] [place], [address] [place] [address], [id number] [phone]          Discharge Medication List as of 11/22/2019  8:49 AM      CONTINUE these medications which have NOT CHANGED    Details   Norethin Ace-Eth Estrad-FE (MINASTRIN 24 FE) 1-20 MG-MCG(24) CHEW Chew 1 tablet daily for 28 days, Starting Thu 10/17/2019, Until Thu 11/14/2019, Normal           No discharge procedures on file  ED Provider  Attending physically available and evaluated Tyron Rodrigues I managed the patient along with the ED Attending      Electronically Signed by         Bernardino Overton MD  11/22/19 7983

## 2019-11-22 NOTE — ED ATTENDING ATTESTATION
11/22/2019  IDavy MD, saw and evaluated the patient  I have discussed the patient with the resident/non-physician practitioner and agree with the resident's/non-physician practitioner's findings, Plan of Care, and MDM as documented in the resident's/non-physician practitioner's note, except where noted  All available labs and Radiology studies were reviewed  I was present for key portions of any procedure(s) performed by the resident/non-physician practitioner and I was immediately available to provide assistance  At this point I agree with the current assessment done in the Emergency Department  I have conducted an independent evaluation of this patient a history and physical is as follows: This is a 20 y o  old female who presents to the ED for evaluation of URI symptoms and CP  About 1 week of URI symptoms - mainly cough and congestions  Non proc cough, CP worse with cough  Has retrosternal CP while at work overnight  Non radiating, no meds, never had this before  No VTE risk factors  No hx asthma - was given albuterol due to viral URI some months ago  VS and nursing notes reviewed  General: Appears in NAD, awake, alert, speaking normally in full sentences  Well-nourished, well-developed  Appears stated age  Head: Normocephalic, atraumatic  Eyes: EOMI  Vision grossly normal  No subconjunctival hemorrhages or occular discharge noted  Symmetrical lids  ENT: Atraumatic external nose and ears  No stridor  Normal phonation  No drooling  Normal swallowing  Neck: No JVD  FROM  No goiter  CV: No pallor  Normal rate  Mild anterior chest wall tenderness  Lungs: No tachypnea  No respiratory distress  MSK: Moving all extremities equally, no peripheral edema  Skin: Dry, intact  No cyanosis  Neuro: Awake, alert, GCS15  CN II-XII grossly intact  Grossly normal gait  Psychiatric/Behavioral: Appropriate mood and affect       A/P: This is a 20 y o  female who presents to the ED for evaluation of cough, chest pain  Will get EKG, CXR  Symptom management  Reevaluate and dispo accordingly      ED Course       Critical Care Time  Procedures

## 2019-11-22 NOTE — DISCHARGE INSTRUCTIONS
Please establish care with a family doctor to get a repeat chest xray in a few weeks  There is an abnormality on the CXR that may represent shadowing vs evidence of viral infection, but should be repeated in a few weeks

## 2020-01-13 DIAGNOSIS — Z30.011 ORAL CONTRACEPTION INITIATION: ICD-10-CM

## 2020-01-13 RX ORDER — NORETHINDRONE ACETATE AND ETHINYL ESTRADIOL AND FERROUS FUMARATE 1MG-20(24)
1 KIT ORAL DAILY
Qty: 28 TABLET | Refills: 0 | Status: SHIPPED | OUTPATIENT
Start: 2020-01-13 | End: 2020-01-21

## 2020-01-16 PROBLEM — Z30.41 ENCOUNTER FOR SURVEILLANCE OF CONTRACEPTIVE PILLS: Status: ACTIVE | Noted: 2020-01-16

## 2020-01-21 ENCOUNTER — ANNUAL EXAM (OUTPATIENT)
Dept: OBGYN CLINIC | Facility: CLINIC | Age: 20
End: 2020-01-21
Payer: COMMERCIAL

## 2020-01-21 ENCOUNTER — HOSPITAL ENCOUNTER (EMERGENCY)
Facility: HOSPITAL | Age: 20
Discharge: HOME | End: 2020-01-21
Attending: EMERGENCY MEDICINE | Admitting: EMERGENCY MEDICINE
Payer: COMMERCIAL

## 2020-01-21 VITALS
RESPIRATION RATE: 18 BRPM | SYSTOLIC BLOOD PRESSURE: 139 MMHG | TEMPERATURE: 97.8 F | OXYGEN SATURATION: 100 % | DIASTOLIC BLOOD PRESSURE: 86 MMHG | HEART RATE: 70 BPM

## 2020-01-21 VITALS
DIASTOLIC BLOOD PRESSURE: 72 MMHG | BODY MASS INDEX: 25.52 KG/M2 | SYSTOLIC BLOOD PRESSURE: 122 MMHG | WEIGHT: 144 LBS | HEIGHT: 63 IN

## 2020-01-21 DIAGNOSIS — L98.8 LESION OF SKIN OF BREAST: ICD-10-CM

## 2020-01-21 DIAGNOSIS — Z01.419 ENCOUNTER FOR GYNECOLOGICAL EXAMINATION (GENERAL) (ROUTINE) WITHOUT ABNORMAL FINDINGS: Primary | ICD-10-CM

## 2020-01-21 DIAGNOSIS — Z30.41 ENCOUNTER FOR SURVEILLANCE OF CONTRACEPTIVE PILLS: ICD-10-CM

## 2020-01-21 DIAGNOSIS — M25.511 ACUTE PAIN OF RIGHT SHOULDER: ICD-10-CM

## 2020-01-21 DIAGNOSIS — Z11.3 SCREEN FOR SEXUALLY TRANSMITTED DISEASES: ICD-10-CM

## 2020-01-21 DIAGNOSIS — M62.838 TRAPEZIUS MUSCLE SPASM: Primary | ICD-10-CM

## 2020-01-21 LAB
C TRACH DNA SPEC QL NAA+PROBE: NEGATIVE
N GONORRHOEA DNA SPEC QL NAA+PROBE: NEGATIVE

## 2020-01-21 PROCEDURE — 96372 THER/PROPH/DIAG INJ SC/IM: CPT

## 2020-01-21 PROCEDURE — 99395 PREV VISIT EST AGE 18-39: CPT | Performed by: STUDENT IN AN ORGANIZED HEALTH CARE EDUCATION/TRAINING PROGRAM

## 2020-01-21 PROCEDURE — 87591 N.GONORRHOEAE DNA AMP PROB: CPT | Performed by: STUDENT IN AN ORGANIZED HEALTH CARE EDUCATION/TRAINING PROGRAM

## 2020-01-21 PROCEDURE — 99284 EMERGENCY DEPT VISIT MOD MDM: CPT | Performed by: EMERGENCY MEDICINE

## 2020-01-21 PROCEDURE — 87491 CHLMYD TRACH DNA AMP PROBE: CPT | Performed by: STUDENT IN AN ORGANIZED HEALTH CARE EDUCATION/TRAINING PROGRAM

## 2020-01-21 PROCEDURE — 99283 EMERGENCY DEPT VISIT LOW MDM: CPT

## 2020-01-21 RX ORDER — NAPROXEN 500 MG/1
500 TABLET ORAL 2 TIMES DAILY WITH MEALS
Qty: 30 TABLET | Refills: 0 | Status: SHIPPED | OUTPATIENT
Start: 2020-01-21 | End: 2021-01-25

## 2020-01-21 RX ORDER — NORETHINDRONE ACETATE AND ETHINYL ESTRADIOL AND FERROUS FUMARATE 1MG-20(24)
1 KIT ORAL DAILY
Qty: 84 TABLET | Refills: 4 | Status: SHIPPED | OUTPATIENT
Start: 2020-01-21 | End: 2020-12-02 | Stop reason: SDUPTHER

## 2020-01-21 RX ORDER — ACETAMINOPHEN 325 MG/1
975 TABLET ORAL ONCE
Status: COMPLETED | OUTPATIENT
Start: 2020-01-21 | End: 2020-01-21

## 2020-01-21 RX ORDER — KETOROLAC TROMETHAMINE 30 MG/ML
15 INJECTION, SOLUTION INTRAMUSCULAR; INTRAVENOUS ONCE
Status: COMPLETED | OUTPATIENT
Start: 2020-01-21 | End: 2020-01-21

## 2020-01-21 RX ORDER — LIDOCAINE 50 MG/G
1 PATCH TOPICAL ONCE
Status: DISCONTINUED | OUTPATIENT
Start: 2020-01-21 | End: 2020-01-21 | Stop reason: HOSPADM

## 2020-01-21 RX ADMIN — LIDOCAINE 1 PATCH: 50 PATCH TOPICAL at 01:41

## 2020-01-21 RX ADMIN — KETOROLAC TROMETHAMINE 15 MG: 30 INJECTION, SOLUTION INTRAMUSCULAR at 01:43

## 2020-01-21 RX ADMIN — ACETAMINOPHEN 975 MG: 325 TABLET ORAL at 01:41

## 2020-01-21 NOTE — PROGRESS NOTES
established patient annual exam - Gynecology   Malia Sinclair 20 y o  female MRN: [id number]  [address]  [facility]  Encounter: [id number]    Chief Complaint   Patient presents with    Gynecologic Exam     Yearly exam, she has no bleeding or cramping with minastrin , she has not been sexually active recently but would like STD testing today  She is not having any problems  History of Present Illness     Malia Sinclair is a 20 y o  female  No LMP recorded  (Menstrual status: Birth Control)  premenopausal, Minastrin for contraception who presents for annual exam     Concerns today: none! Doing extremely well on the Minastrin, previously had extremely heavy painful periods  Now no periods at all and very happy with it  REVIEW OF SYSTEMS  CONSTITUTIONAL:  No weight loss, fever, chills, weakness  HEENT: No visual loss, blurred vision  SKIN: No rash or itching  CARDIOVASCULAR: No chest pain, chest pressure, or chest discomfort  RESPIRATORY: No shortness of breath, cough or sputum  GASTROINTESTINAL: No nausea, emesis, or diarrhea  NEUROLOGICAL: No  dizziness, syncope, paralysis  Reports occasional headaches since concussion a few years ago, fewer in the last year  No aura  Triggered by bright light  MUSCUOSKELETAL: No muscle, back pain, joint stiffness or bruising  INFECTIOUS: No fever, chills  PSYCHIATRIC: No disorder of thought or mood  HEMATOLOGIC: No easy bruising or bleeding  GYN: No menstrual bleeding  No  involuntary urine loss  No pain with intercourse   No vaginal dryness    Past Medical History:   Diagnosis Date    Allergic rhinitis     Eczema     Kidney stone        Patient Active Problem List   Diagnosis    Head injury    Intractable acute post-traumatic headache    Encounter for gynecological examination (general) (routine) without abnormal findings    Encounter for surveillance of contraceptive pills       No past surgical history on file     OB History        0    Para   0    Term   0       0    AB   0    Living   0       SAB   0    TAB   0    Ectopic   0    Multiple   0    Live Births   0                 No OB History data found     [de-identified]    GYN History  Menarche age 5  LMP No LMP recorded  (Menstrual status: Birth Control)  amenorrheic on cOCP  Frequency q30 lasting 7 days  Regular periods  No history abnormal Pap smears  No history of cryotherapy, LEEP, or cold knife cone of the cervix    Health maintenance  Last pap smear: Not on file not applicable  Bone density scan: n/a  Last mammogram: Not on file n/a  Last colonoscopy: Not on file n/a  HPV vaccination?: yes    Family History   Problem Relation Age of Onset    No Known Problems Mother     No Known Problems Father     Mental illness Neg Hx     Substance Abuse Neg Hx        Social History   Social History     Substance and Sexual Activity   Alcohol Use No     Social History     Substance and Sexual Activity   Drug Use No     Social History     Tobacco Use   Smoking Status Never Smoker   Smokeless Tobacco Never Used       Sexually active? Not currently  Current sexual partner(s): previously men  History of STD: yes, chlamydia  Interested in STD screening today? Yes, by urine only   Concerns about sex: none    Occupation: works at the [employer]      Current Outpatient Medications:     naproxen (NAPROSYN) 500 mg tablet, Take 1 tablet (500 mg total) by mouth 2 (two) times a day with meals, Disp: 30 tablet, Rfl: 0    Norethin Ace-Eth Estrad-FE (MINASTRIN 24 FE) 1-20 MG-MCG(24) CHEW, Chew 1 tablet daily for 28 days, Disp: 84 tablet, Rfl: 4  No current facility-administered medications for this visit  No Known Allergies    Objective   Vitals: Blood pressure 122/72, height 5' 3" (1 6 m), weight 65 3 kg (144 lb)  Body mass index is 25 51 kg/m²      General: NAD, AAOx3  Heart: RRR  Lungs: CTAB  Neck: supple, no thyromegaly or thyroid nodules appreciated    Breast: nipples everted bilaterally, no skin changes  No dimpling, redness, or erythema  No breast masses or axillary masses bilaterally  Skin of left breast, lesion 1 cm abnormal borders, superficial, smooth of left berast at 1:00 position, additional 12:00 lesion similar characterization    Abdomen: soft, non-distended, non tender to palpation  Extremities: non-tender to palpation  Speculum exam: Normal appearing external genitalia, normal hair distribution  Urethra well-suspended, no clitoromegaly noted  Vagina pink moist  Physiologic discharge noted  Speculum and internal exam deferred today      Lab Results:   No visits with results within 1 Day(s) from this visit  Latest known visit with results is:   Admission on 11/22/2019, Discharged on 11/22/2019   Component Date Value    Color, UA 11/22/2019 see results     Color, UA 11/22/2019 Yellow     Clarity, UA 11/22/2019 Clear     pH, UA 11/22/2019 6 5     Leukocytes, UA 11/22/2019 Trace*    Nitrite, UA 11/22/2019 Negative     Protein, UA 11/22/2019 Negative     Glucose, UA 11/22/2019 Negative     Ketones, UA 11/22/2019 Negative     Urobilinogen, UA 11/22/2019 0 2     Bilirubin, UA 11/22/2019 Negative     Blood, UA 11/22/2019 Negative     Specific Gravity, UA 11/22/2019 1 020     RBC, UA 11/22/2019 None Seen     WBC, UA 11/22/2019 4-10*    Epithelial Cells 11/22/2019 Occasional     Bacteria, UA 11/22/2019 Occasional     Hyaline Casts, UA 11/22/2019 3-5*    Ventricular Rate 11/22/2019 56     Atrial Rate 11/22/2019 56     IN Interval 11/22/2019 140     QRSD Interval 11/22/2019 70     QT Interval 11/22/2019 416     QTC Interval 11/22/2019 401     P Axis 11/22/2019 40     QRS Axis 11/22/2019 53     T Wave Axis 11/22/2019 54         Assessment/Plan     20 y o  Tammi Cancer with normal annual gynecologic examination      Diagnoses and all orders for this visit:    Encounter for gynecological examination (general) (routine) without abnormal findings   - For pap smear next year and internal examination  Screen for sexually transmitted diseases  -     Chlamydia/GC amplified DNA by PCR   - Reviewed barrier contraception as only way to prevent STD, expressed understanding    Encounter for surveillance of contraceptive pills  -     Norethin Ace-Eth Estrad-FE (MINASTRIN 24 FE) 1-20 MG-MCG(24) CHEW; Chew 1 tablet daily for 28 days   - Refill sent    No history of deep vein thrombosis, pulmonary embolism, stroke, hypertension, smoking, or migraines with aura  Reviewed slightly increased risk of the above with estrogen-containing pills  Lesion of skin of breast  -     Ambulatory referral to Dermatology; Future  Appreciate Dermatology reccs - reviewed as dark, irregular borders, and new would like a specialist to take a look  Unfortunately in between PCP (transitioning from pediatrician)  Referral placed today  Advised using sunscreen for skin protection      Follow up PRN or for one year annual exam

## 2020-01-21 NOTE — ED PROVIDER NOTES
History  Chief Complaint   Patient presents with    Shoulder Pain     pt c/o right shoulder pain that radiates to her elbow that started yesterday  pt denies injury to area  Patient is a 50-year-old female presents for right shoulder pain  Patient says it started yesterday when she was at work  She said that it started when she was on her break at work  Patient is a  at the [employer]  She says that it radiates down into her right arm  She says she has been taking Tylenol for the discomfort without relief  She admits to some numbness and tingling in her right arm and hand  She denies any trauma to her shoulder, arm  She denies any neck pain, chest pain, shortness of breath  Prior to Admission Medications   Prescriptions Last Dose Informant Patient Reported? Taking? Norethin Ace-Eth Estrad-FE (MINASTRIN 24 FE) 1-20 MG-MCG(24) CHEW   No No   Sig: Chew 1 tablet daily for 28 days      Facility-Administered Medications: None       Past Medical History:   Diagnosis Date    Allergic rhinitis     Eczema     Kidney stone        History reviewed  No pertinent surgical history  Family History   Problem Relation Age of Onset    No Known Problems Mother     No Known Problems Father     Mental illness Neg Hx     Substance Abuse Neg Hx      I have reviewed and agree with the history as documented  Social History     Tobacco Use    Smoking status: Never Smoker    Smokeless tobacco: Never Used   Substance Use Topics    Alcohol use: No    Drug use: No        Review of Systems   Constitutional: Negative for chills, diaphoresis and fever  HENT: Negative for congestion, sinus pressure, sore throat and trouble swallowing  Eyes: Negative for pain, discharge and itching  Respiratory: Negative for cough, chest tightness, shortness of breath and wheezing  Cardiovascular: Negative for chest pain, palpitations and leg swelling     Gastrointestinal: Negative for abdominal distention, abdominal pain, blood in stool, diarrhea, nausea and vomiting  Endocrine: Negative for polyphagia and polyuria  Genitourinary: Negative for difficulty urinating, dysuria, flank pain, hematuria, pelvic pain and vaginal bleeding  Musculoskeletal: Positive for arthralgias (right shoulder)  Negative for back pain, neck pain and neck stiffness  Skin: Negative for rash  Neurological: Negative for dizziness, syncope, weakness, light-headedness and headaches  Physical Exam  ED Triage Vitals   Temperature Pulse Respirations Blood Pressure SpO2   01/21/20 0130 01/21/20 0130 01/21/20 0130 01/21/20 0130 01/21/20 0130   97 8 °F (36 6 °C) 70 18 139/86 100 %      Temp src Heart Rate Source Patient Position - Orthostatic VS BP Location FiO2 (%)   -- -- -- -- --             Pain Score       01/21/20 0141       7             Orthostatic Vital Signs  Vitals:    01/21/20 0130   BP: 139/86   Pulse: 70       Physical Exam   Constitutional: She is oriented to person, place, and time  She appears well-developed and well-nourished  No distress  HENT:   Head: Normocephalic and atraumatic  Right Ear: External ear normal    Left Ear: External ear normal    Mouth/Throat: No oropharyngeal exudate  Eyes: Pupils are equal, round, and reactive to light  Conjunctivae are normal    Neck: Normal range of motion  Neck supple  Cardiovascular: Normal rate, regular rhythm, normal heart sounds and intact distal pulses  Exam reveals no gallop and no friction rub  No murmur heard  Pulmonary/Chest: Effort normal and breath sounds normal  No respiratory distress  She has no wheezes  She has no rales  Abdominal: Soft  She exhibits no distension  There is no tenderness  There is no guarding  Musculoskeletal: Normal range of motion  She exhibits no edema, tenderness or deformity  Arms:  Right arm neurovascularly intact   Lymphadenopathy:     She has no cervical adenopathy     Neurological: She is alert and oriented to person, place, and time  No cranial nerve deficit or sensory deficit  She exhibits normal muscle tone  Skin: Skin is warm and dry  Psychiatric: She has a normal mood and affect  Nursing note and vitals reviewed  ED Medications  Medications   lidocaine (LIDODERM) 5 % patch 1 patch (1 patch Topical Medication Applied 1/21/20 0141)   ketorolac (TORADOL) injection 15 mg (15 mg Intramuscular Given 1/21/20 0143)   acetaminophen (TYLENOL) tablet 975 mg (975 mg Oral Given 1/21/20 0141)       Diagnostic Studies  Results Reviewed     None                 No orders to display         Procedures  Procedures      ED Course                               MDM  Number of Diagnoses or Management Options  Acute pain of right shoulder:   Trapezius muscle spasm:   Diagnosis management comments: 69-year-old female presenting for right trapezius/right shoulder discomfort  Started yesterday while she was at work  Says it radiates into her right arm  Denies any trauma to her shoulder arm  Has full range of motion of her shoulder, elbow and wrist   Has point tenderness in the right trapezius area  Arm is neurovascularly intact  Will give Toradol, Lidoderm patch and Tylenol  Told to make an appointment with a family doctor if the symptoms persist         Disposition  Final diagnoses:   Trapezius muscle spasm   Acute pain of right shoulder     Time reflects when diagnosis was documented in both MDM as applicable and the Disposition within this note     Time User Action Codes Description Comment    1/21/2020  1:54 AM [place] Sana Add [V74 038] Trapezius muscle spasm     1/21/2020  1:55 AM [place] Sana Add [M25 511] Acute pain of right shoulder       ED Disposition     ED Disposition Condition Date/Time Comment    Discharge Stable Tue Jan 21, 2020  1:54 AM Mack Colon discharge to home/self care              Follow-up Information     Follow up With Specialties Details Why Contact Info Additional Information    [facility] [employer] [facility] Internal Medicine Schedule an appointment as soon as possible for a visit  For follow up of symptoms [id number] [facility] [id number] [address] [facility], [address] [place] [address], [place], [place], [id number] [address]  Schedule an appointment as soon as possible for a visit  For follow up of symptoms if your employer requires it [facility] [address] [facility]  [phone]           Patient's Medications   Discharge Prescriptions    NAPROXEN (NAPROSYN) 500 MG TABLET    Take 1 tablet (500 mg total) by mouth 2 (two) times a day with meals       Start Date: 1/21/2020 End Date: --       Order Dose: 500 mg       Quantity: 30 tablet    Refills: 0     No discharge procedures on file  ED Provider  Attending physically available and evaluated Lora Rico I managed the patient along with the ED Attending      Electronically Signed by         Elba Bolanos DO  01/21/20 1420

## 2020-01-21 NOTE — ED ATTENDING ATTESTATION
1/21/2020  IChris DO, saw and evaluated the patient  I have discussed the patient with the resident/non-physician practitioner and agree with the resident's/non-physician practitioner's findings, Plan of Care, and MDM as documented in the resident's/non-physician practitioner's note, except where noted  All available labs and Radiology studies were reviewed  I was present for key portions of any procedure(s) performed by the resident/non-physician practitioner and I was immediately available to provide assistance  At this point I agree with the current assessment done in the Emergency Department  I have conducted an independent evaluation of this patient a history and physical is as follows:    21-year-old female presents with right shoulder pain  Started yesterday when she was at work  Worse with movement  Pain radiates down right arm  No neck pain  On exam-no acute distress, heart regular, no respiratory distress, tenderness with muscle spasm on the right trapezius, muscle strength 5/5 bilateral upper extremities  No midline C-spine tenderness, no bony tenderness of the shoulder    Plan-Lidoderm patch, Toradol for trapezius muscle spasm    ED Course         Critical Care Time  Procedures

## 2020-01-24 ENCOUNTER — OFFICE VISIT (OUTPATIENT)
Dept: INTERNAL MEDICINE CLINIC | Age: 20
End: 2020-01-24
Payer: COMMERCIAL

## 2020-01-24 VITALS
TEMPERATURE: 90 F | HEART RATE: 98 BPM | DIASTOLIC BLOOD PRESSURE: 78 MMHG | BODY MASS INDEX: 25.69 KG/M2 | OXYGEN SATURATION: 98 % | WEIGHT: 145 LBS | HEIGHT: 63 IN | SYSTOLIC BLOOD PRESSURE: 118 MMHG

## 2020-01-24 DIAGNOSIS — M62.838 TRAPEZIUS MUSCLE SPASM: ICD-10-CM

## 2020-01-24 DIAGNOSIS — M79.601 MUSCULOSKELETAL PAIN OF RIGHT UPPER EXTREMITY: Primary | ICD-10-CM

## 2020-01-24 PROCEDURE — 99213 OFFICE O/P EST LOW 20 MIN: CPT | Performed by: INTERNAL MEDICINE

## 2020-01-24 RX ORDER — CYCLOBENZAPRINE HCL 5 MG
5 TABLET ORAL
Qty: 5 TABLET | Refills: 0 | Status: SHIPPED | OUTPATIENT
Start: 2020-01-24 | End: 2021-01-25

## 2020-01-24 NOTE — PROGRESS NOTES
Assessment/Plan:    Musculoskeletal pain of the right upper extremity  - patient has been counseled to continue with her naproxen 500 mg twice daily with meals  -she was counseled to continue to use warm compress   - patient was referred to occupational therapy since it appears that her symptoms may be related to the job that she does and will likely be worsened by going back to work   -she was given a doctor's work note stating that she had been referred to occupational health for clearance to return to work  Trapezius muscle spasm  - will start patient on Flexeril 5 mg at night to avoid daytime sleepiness   -she was counseled to continue to use warm compress- she states that she uses a heating pad and was counseled to continue with  that  Diagnoses and all orders for this visit:    Musculoskeletal pain of right upper extremity  -     cyclobenzaprine (FLEXERIL) 5 mg tablet; Take 1 tablet (5 mg total) by mouth daily at bedtime  -     Ambulatory referral to Occupational Therapy; Future    Trapezius muscle spasm  -     cyclobenzaprine (FLEXERIL) 5 mg tablet; Take 1 tablet (5 mg total) by mouth daily at bedtime  -     Ambulatory referral to Occupational Therapy; Future          Subjective:      Patient ID: Ye Farris is a 20 y o  female  HPI  Patient presents with complaints of pain in her right shoulder, radiating from her right neck down her right upper extremity with an episode of a bluish discoloration of her right hand that occurred while she was at work 4 days ago  She states that she works in a [employer] and is constantly using her right hand and occasionally lifts heavy objects  She normally works overnight and developed this pain in the morning after a night of working  She grades the pain as 7/10 and describes it as dull  She denies any history of trauma, motor vehicle accident or fall prior to onset of the pain  She denies any prior similar occurrence    Of note, she was only on oral contraceptive pills prior to onset of her pain  She went to the emergency department and was given naproxen and states that she feels a bit better  Her job told her that she needs clearance before she can return to work  Of note, patient states that she has been working there for only 7 months and works about 40 hours week  The following portions of the patient's history were reviewed and updated as appropriate:   She  has a past medical history of Allergic rhinitis, Eczema, and Kidney stone  She   Patient Active Problem List    Diagnosis Date Noted    Musculoskeletal pain of right upper extremity 01/24/2020    Trapezius muscle spasm 01/24/2020    Encounter for surveillance of contraceptive pills 01/16/2020    Encounter for gynecological examination (general) (routine) without abnormal findings 01/16/2019    Head injury 09/20/2018    Intractable acute post-traumatic headache 09/20/2018     She  has no past surgical history on file  Her family history includes No Known Problems in her father and mother  She  reports that she has never smoked  She has never used smokeless tobacco  She reports that she does not drink alcohol or use drugs  Current Outpatient Medications   Medication Sig Dispense Refill    naproxen (NAPROSYN) 500 mg tablet Take 1 tablet (500 mg total) by mouth 2 (two) times a day with meals 30 tablet 0    Norethin Ace-Eth Estrad-FE (MINASTRIN 24 FE) 1-20 MG-MCG(24) CHEW Chew 1 tablet daily for 28 days 84 tablet 4    cyclobenzaprine (FLEXERIL) 5 mg tablet Take 1 tablet (5 mg total) by mouth daily at bedtime 5 tablet 0     No current facility-administered medications for this visit        Current Outpatient Medications on File Prior to Visit   Medication Sig    naproxen (NAPROSYN) 500 mg tablet Take 1 tablet (500 mg total) by mouth 2 (two) times a day with meals    Norethin Ace-Eth Estrad-FE (MINASTRIN 24 FE) 1-20 MG-MCG(24) CHEW Chew 1 tablet daily for 28 days     No current facility-administered medications on file prior to visit  She has No Known Allergies       Review of Systems   Constitutional: Negative for activity change, chills, fatigue, fever and unexpected weight change  HENT: Positive for sore throat ( mild sore throat)  Negative for ear pain, postnasal drip, rhinorrhea and sinus pressure  Eyes: Negative for pain  Respiratory: Negative for cough, choking, chest tightness, shortness of breath and wheezing  Cardiovascular: Negative for chest pain, palpitations and leg swelling  Gastrointestinal: Negative for abdominal pain, constipation, diarrhea, nausea and vomiting  Genitourinary: Negative for dysuria and hematuria  Musculoskeletal: Positive for arthralgias (pain in the right upper extemity radiating from the right ower neck and running all the way down to the hand wt swelling of the hand,wth a blue color) and neck pain  Negative for back pain, gait problem, joint swelling, myalgias and neck stiffness  Skin: Negative for pallor and rash  Neurological: Negative for dizziness, tremors, seizures, syncope, light-headedness and headaches  Hematological: Negative for adenopathy  Psychiatric/Behavioral: Negative for behavioral problems  Objective:      /78 (BP Location: Left arm, Patient Position: Sitting, Cuff Size: Standard)   Pulse 98   Temp (!) 90 °F (32 2 °C) (Tympanic)   Ht 5' 3 47" (1 612 m)   Wt 65 8 kg (145 lb)   SpO2 98%   BMI 25 31 kg/m²          Physical Exam   Constitutional: She is oriented to person, place, and time  She appears well-developed and well-nourished  No distress  HENT:   Head: Normocephalic and atraumatic  Right Ear: Right ear swelling:  mild external auditory canal erythema bilaterally  Nose: Mucosal edema (Nasal mucosa erythema and edema, mild) and rhinorrhea present  Mouth/Throat: Posterior oropharyngeal edema and posterior oropharyngeal erythema present  No oropharyngeal exudate   Tonsils are 2+ on the right  Tonsils are 2+ on the left  Eyes: Pupils are equal, round, and reactive to light  Conjunctivae and EOM are normal  Right eye exhibits no discharge  Left eye exhibits no discharge  No scleral icterus  Neck: Neck supple  No JVD present  Muscular tenderness present  Decreased range of motion ( with rotation of the neck to the left and side bending to the left) present  No tracheal deviation present  No thyromegaly present  Cardiovascular: Normal rate, regular rhythm, normal heart sounds and intact distal pulses  Exam reveals no gallop and no friction rub  No murmur heard  Pulmonary/Chest: Effort normal and breath sounds normal  No respiratory distress  She has no wheezes  She has no rales  She exhibits no tenderness  Abdominal: Soft  Bowel sounds are normal  She exhibits no distension and no mass  There is no tenderness  There is no rebound and no guarding  Musculoskeletal: She exhibits no edema or deformity  Right shoulder: She exhibits tenderness (Tenderness and muscle spasm along the distribution of the trapezius muscle on the right  With muscle spasm   ) and spasm (Trapezius muscle at  the right upper back)  She exhibits normal range of motion (There is full range of motion in all directions even though patient expresses tenderness at the extremes of motion especially in flexion, abduction and extension at the right shoulder joint)  Right wrist: She exhibits no tenderness and no swelling  Left hand: She exhibits no tenderness  Lymphadenopathy:     She has no cervical adenopathy  Neurological: She is alert and oriented to person, place, and time  She has normal reflexes  No cranial nerve deficit  She exhibits normal muscle tone   Coordination normal    Muscle strength is 4 5/5 in the right upper extremity compared to 5 on the left upper extremity and sensation is mildly reduced on the right upper extremity compared to the left  Pulses at 2+ in the radial bilaterally   Skin: Skin is warm and dry  No rash noted  She is not diaphoretic  No erythema  No pallor  Psychiatric: She has a normal mood and affect   Her behavior is normal

## 2020-01-24 NOTE — LETTER
January 24, 2020     Patient: Tyron Macedo   YOB: 2000   Date of Visit: 1/24/2020       To Whom it May Concern:    Tyron Macedo is under my professional care  She was seen in my office on 1/24/2020  She may return to work after clearance by occupational health  If you have any questions or concerns, please don't hesitate to call           Sincerely,          Erin Patel DO        CC: No Recipients

## 2020-01-27 ENCOUNTER — EVALUATION (OUTPATIENT)
Dept: PHYSICAL THERAPY | Age: 20
End: 2020-01-27
Payer: COMMERCIAL

## 2020-01-27 DIAGNOSIS — M62.838 TRAPEZIUS MUSCLE SPASM: ICD-10-CM

## 2020-01-27 DIAGNOSIS — M79.601 MUSCULOSKELETAL PAIN OF RIGHT UPPER EXTREMITY: ICD-10-CM

## 2020-01-27 PROCEDURE — 97161 PT EVAL LOW COMPLEX 20 MIN: CPT

## 2020-01-27 PROCEDURE — 97110 THERAPEUTIC EXERCISES: CPT

## 2020-01-27 NOTE — PROGRESS NOTES
PT Evaluation     Today's date: 2020  Patient name: Karina Doty  : 2000  MRN: [id number]  Referring provider: Janis Terrazas DO  Dx:   Encounter Diagnosis     ICD-10-CM    1  Musculoskeletal pain of right upper extremity M79 601 Ambulatory referral to Occupational Therapy   2  Trapezius muscle spasm M62 838 Ambulatory referral to Occupational Therapy                  Assessment  Assessment details: Patient is a 20 y o  Female reporting to PT with complaints of R UT pain  She presents with decreased ability to perform ADL's and pain with certain shoulder movements  No red flags observed  No signs of shoulder dysfunction  Findings consistent with a muscle strain  Will benefit from skilled PT To address impairments listed  Impairments: abnormal movement, activity intolerance, lacks appropriate home exercise program, pain with function, poor posture  and poor body mechanics    Symptom irritability: moderateBarriers to therapy: None  Understanding of Dx/Px/POC: good   Prognosis: good    Goals  STG's: 4 Weeks  1 ) Patient will be independent with HEP   2 ) Patient will be able to reach for a overhead with <3/10 pain  3 ) Patient will be able to don/doff jacket without difficulty  4 ) Patient will be able to reach behind their back with <3/10 pain  5 ) Patient will be able to sleep on affected side throughout the night without waking  LTG's: 8 Weeks  1 ) Patient will demonstrate 5/5 shoulder strength B/L in all planes, which will demonstrate ability to perform ADL's normally without restriction =  2 ) Patient will be able to lift 10-15 lbs overhead using affected arm with <3/10 pain  3 ) Patient will be able to perform work and/or recreational ADL's without difficulty  4 ) Patient will exceed predicted FOTO score  5 ) Patient will be able to perform all household chores without difficulty          Plan  Plan details: Patient was educated regarding the etiology and pathomechanics of their condition  They demonstrated understanding verbally  Patient was provided with an HEP  They were educated regarding repetitions, resistance, and proper technique  Patient demonstrated understanding verbally  Patient would benefit from: skilled physical therapy  Planned therapy interventions: manual therapy, neuromuscular re-education, patient education, postural training, strengthening, stretching, therapeutic activities, therapeutic exercise, home exercise program, functional ROM exercises, flexibility and body mechanics training  Frequency: 2x week  Duration in weeks: 8  Treatment plan discussed with: patient        Subjective Evaluation    History of Present Illness  Mechanism of injury: Patient is a 20 y o  Female reporting to PT with complaints of R shoulder pain  Onset of sx's 1 week ago  She was fixing her jacket while at work when a sudden pain began within her R UT area  Patient seen by PCP and referred to PT  No imaging performed  Initial N/T extending from the UT muscle down the lateral aspect of her arm and into the wrist  Sx's have since resolved  Pain within R UT muscle remains primary complaint  Minimal neck pain  Patient is employed as a  at a [employer]  Currently out of work until cleared by physician  Patient was unable to move arm initially, but since prescription of muscle relaxants, patient has been able to move arm with less discomfort  Patient consented to manual therapy and physical examination  Demonstrated understanding verbally               Not a recurrent problem   Quality of life: good    Pain  Current pain ratin  At best pain ratin  At worst pain ratin  Location: R Shoulder  Quality: dull ache, tight, pressure and discomfort  Relieving factors: change in position, relaxation, rest and support  Aggravating factors: overhead activity and lifting    Patient Goals  Patient goals for therapy: decreased pain, increased motion, increased strength and return to work          Objective     Concurrent Complaints  Negative for night pain, disturbed sleep, dizziness, faints, headaches, nausea/motion sickness, tinnitus, trouble swallowing, difficulty breathing, shortness of breath, respiratory pain, visual change, cardiac problem, kidney problem, gallbladder problem, stomach problem, ulcer, appendix problem, spleen problem, pancreas problem, history of cancer, history of trauma and infection    Palpation     Additional Palpation Details  Point tenderness with palpation of R UT muscle    Neurological Testing     Sensation   Cervical/Thoracic   Left   Intact: light touch    Right   Intact: light touch    Reflexes   Left   Biceps (C5/C6): normal (2+)  Triceps (C7): normal (2+)    Right   Biceps (C5/C6): normal (2+)  Triceps (C7): normal (2+)    Active Range of Motion   Left Shoulder   Flexion: 170 degrees   Abduction: 165 degrees   External rotation 0°: 70 degrees   Internal rotation BTB: L1     Right Shoulder   Flexion: 170 degrees   Abduction: 165 degrees   External rotation 0°: 70 degrees   Internal rotation BTB: L1     Strength/Myotome Testing   Cervical Spine     Left   Interossei strength (t1): 5    Right   Interossei strength (t1): 5    Left Shoulder     Planes of Motion   Flexion: 4+   Abduction: 4+   External rotation at 0°: 5   Internal rotation at 0°: 5     Isolated Muscles   Upper trapezius: 5     Right Shoulder     Planes of Motion   Flexion: 4+   Abduction: 4+   External rotation at 0°: 5   Internal rotation at 0°: 5     Isolated Muscles   Upper trapezius: 5     Left Elbow   Flexion: 5  Extension: 5    Right Elbow   Flexion: 5  Extension: 5    Left Wrist/Hand   Wrist extension: 5  Wrist flexion: 5  Thumb extension: 5    Right Wrist/Hand   Wrist extension: 5  Wrist flexion: 5  Thumb extension: 5    Tests     Right Shoulder   Negative drop arm, empty can, Hawkin's, Neer's and painful arc       General Comments:    Upper quarter screen   Shoulder: unremarkable  Elbow: unremarkable  Hand/wrist: unremarkable  Neuro Exam:     Headaches   Patient reports headaches: No               Precautions: None      Manual  1/27  IE            R UT Stretch              R LS Stretch                                                        Exercise Diary  1/27  IE            UBE                          UT Stretch             Chin Tucks             Pec Stretch                          TB Rows/Ext             TB ER/IR             TB No Money                          CC Rows             DB Shoulder flex/Ext                                                                                                                         Modalities

## 2020-01-30 ENCOUNTER — TELEPHONE (OUTPATIENT)
Dept: INTERNAL MEDICINE CLINIC | Age: 20
End: 2020-01-30

## 2020-01-30 ENCOUNTER — OFFICE VISIT (OUTPATIENT)
Dept: PHYSICAL THERAPY | Age: 20
End: 2020-01-30
Payer: COMMERCIAL

## 2020-01-30 DIAGNOSIS — M62.838 TRAPEZIUS MUSCLE SPASM: ICD-10-CM

## 2020-01-30 DIAGNOSIS — M79.601 MUSCULOSKELETAL PAIN OF RIGHT UPPER EXTREMITY: Primary | ICD-10-CM

## 2020-01-30 PROCEDURE — 97110 THERAPEUTIC EXERCISES: CPT

## 2020-01-30 NOTE — PROGRESS NOTES
Daily Note     Today's date: 2020  Patient name: Scar Caicedo  : 2000  MRN: [id number]  Referring provider: Heena Catherine DO  Dx:   Encounter Diagnosis     ICD-10-CM    1  Musculoskeletal pain of right upper extremity M79 601    2  Trapezius muscle spasm M62 838                   Subjective: Patient presents doing well overall  0/10 pain noted  No complaints offered  Objective: See treatment diary below      Assessment: Patient tolerated tx session without significant complaint  Able to perform all UE PRE's without significant difficulty  Patient demonstrated fatigue post treatment, exhibited good technique with therapeutic exercises and would benefit from continued PT      Plan: Continue per plan of care        Precautions: None      Manual    IE            R UT Stretch   5'           R LS Stretch                                                        Exercise Diary    IE            UBE  5'/5'                        Chin Tucks  30x5"           Pec Stretch  4x30"           Sleeper Stretch  3x30"                        TB Rows/Ext  Green  30x ea           TB ER/IR             TB No Money  Green  30x                        CC Ext  7 5#  30x           CC Rows  25#  30x           Prone Rows  5#  20x           Prone Y/T/I's  20x ea                                                                                             Modalities

## 2020-01-30 NOTE — TELEPHONE ENCOUNTER
Patient stopped by the office stating that she needs FMLA papers to be filled out due to her conditions that she is having    She seen Dr Alan Barcenas for the injury and will have her fill these out for her     The forms are at the bath office on [address]      Please fax to them job at [phone]    Also call the patient back at [phone]

## 2020-01-31 NOTE — PROGRESS NOTES
I called patient on January 30th, 2020 to inquire about her physical therapy and she stated that she goes for physical therapy twice a week and that she was told that she has done very well and will complete her physical therapy and be able to return to work on February 6th, 2020

## 2020-01-31 NOTE — TELEPHONE ENCOUNTER
Dr Chintan Villa filled out the forms and it was faxed to [facility] and patient was called to  a copy

## 2020-02-03 ENCOUNTER — OFFICE VISIT (OUTPATIENT)
Dept: PHYSICAL THERAPY | Age: 20
End: 2020-02-03
Payer: COMMERCIAL

## 2020-02-03 DIAGNOSIS — M62.838 TRAPEZIUS MUSCLE SPASM: ICD-10-CM

## 2020-02-03 DIAGNOSIS — M79.601 MUSCULOSKELETAL PAIN OF RIGHT UPPER EXTREMITY: Primary | ICD-10-CM

## 2020-02-03 PROCEDURE — 97110 THERAPEUTIC EXERCISES: CPT

## 2020-02-03 NOTE — PROGRESS NOTES
Daily Note     Today's date: 2/3/2020  Patient name: Mike Walker  : 2000  MRN: [id number]  Referring provider: Mara Calvo DO  Dx:   Encounter Diagnosis     ICD-10-CM    1  Musculoskeletal pain of right upper extremity M79 601    2  Trapezius muscle spasm M62 838                   Subjective: Patient presents doing fairly well overall  Muscle soreness following previous tx session, but sx's have since resolved  Denies pain  No complaints offered  Objective: See treatment diary below      Assessment: Patient tolerated tx well without reproduction of sx's  Able to perform exercises with minimal cuing needed  Patient has been progressing well and may be appropriate for discharge following upcoming tx session  Patient demonstrated fatigue post treatment, exhibited good technique with therapeutic exercises and would benefit from continued PT      Plan: Potential discharge next visit       Precautions: None      Manual    IE            R UT Stretch   5'           R LS Stretch                                                        Exercise Diary    IE  2/3          UBE  5'/5' 4'/4'                       Chin Tucks  30x5" 30x5"          Pec Stretch  4x30" 4x30"          Sleeper Stretch  3x30" 3x30"          UT Stretch   3x30"                       TB Rows/Ext  Green  30x ea Green  30x ea          TB No Money  Green  30x Green  30x                       CC Ext  7 5#  30x 7 5#  30x          CC Rows  25#  30x 25#  30x          Prone Rows  5#  20x 5#  30x          Prone Y/T/I's  20x ea 20x ea                                                                                            Modalities

## 2020-02-05 ENCOUNTER — APPOINTMENT (OUTPATIENT)
Dept: PHYSICAL THERAPY | Age: 20
End: 2020-02-05
Payer: COMMERCIAL

## 2020-02-06 ENCOUNTER — OFFICE VISIT (OUTPATIENT)
Dept: PHYSICAL THERAPY | Age: 20
End: 2020-02-06
Payer: COMMERCIAL

## 2020-02-06 DIAGNOSIS — M62.838 TRAPEZIUS MUSCLE SPASM: ICD-10-CM

## 2020-02-06 DIAGNOSIS — M79.601 MUSCULOSKELETAL PAIN OF RIGHT UPPER EXTREMITY: Primary | ICD-10-CM

## 2020-02-06 PROCEDURE — 97110 THERAPEUTIC EXERCISES: CPT

## 2020-02-06 PROCEDURE — 97140 MANUAL THERAPY 1/> REGIONS: CPT

## 2020-02-06 NOTE — PROGRESS NOTES
Discharge Summary     Today's date: 2020  Patient name: Mack Colon  : 2000  MRN: [id number]  Referring provider: Jared Diaz DO  Dx:   Encounter Diagnosis     ICD-10-CM    1  Musculoskeletal pain of right upper extremity M79 601    2  Trapezius muscle spasm M62 838                   Subjective: Patient presents doing very well overall  0/10 pain noted  Patient has been adherent with HEP and reports independence  Overall, she has had no discomfort and desires to return to full-duty at work without limitation  Objective: See treatment diary below      Assessment: Overall, patient demonstrates 100% improvement since onset of sx's  Improved posture, scapular strength, and ability to sit/stand for extended periods of time without discomfort  At this time, patient is deemed appropriate to be discharged and to return to work  Patient demonstrated fatigue post treatment and exhibited good technique with therapeutic exercises      Plan: Discharge from PT to HEP  Patient was educated regarding the etiology and pathomechanics of their condition  They demonstrated understanding verbally  Patient was provided with an HEP  They were educated regarding repetitions, resistance, and proper technique  Patient demonstrated understanding verbally          Precautions: None      Manual    IE  2/6          R UT Stretch   5' 10'          R LS Stretch                                                        Exercise Diary    IE  2/3 2/6         UBE  5'/5' 4'/4' 4'/4'                      Chin Tucks  30x5" 30x5" 30x5"         Pec Stretch  4x30" 4x30" 4x30"         Sleeper Stretch  3x30" 3x30" 3x30"         UT Stretch   3x30" 3x30"                      TB Rows/Ext  Green  30x ea Green  30x ea Green  30x ea         TB No Money  Green  30x Green  30x Green  30x ea                      CC Ext  7 5#  30x 7 5#  30x 7 5#  30x         CC Rows  25#  30x 25#  30x 25#  30x         Prone Rows  5#  20x 5#  30x 5#  30x         Prone Y/T/I's  20x ea 20x ea 20x ea                                                                                           Modalities

## 2020-03-05 VITALS
HEIGHT: 63 IN | BODY MASS INDEX: 25.69 KG/M2 | WEIGHT: 145 LBS | SYSTOLIC BLOOD PRESSURE: 139 MMHG | TEMPERATURE: 98.1 F | DIASTOLIC BLOOD PRESSURE: 80 MMHG | OXYGEN SATURATION: 98 % | HEART RATE: 81 BPM

## 2020-03-05 PROCEDURE — 99282 EMERGENCY DEPT VISIT SF MDM: CPT

## 2020-03-06 ENCOUNTER — HOSPITAL ENCOUNTER (EMERGENCY)
Facility: HOSPITAL | Age: 20
Discharge: HOME | End: 2020-03-06
Attending: EMERGENCY MEDICINE | Admitting: EMERGENCY MEDICINE
Payer: COMMERCIAL

## 2020-03-06 DIAGNOSIS — K08.89 DENTALGIA: Primary | ICD-10-CM

## 2020-03-06 PROCEDURE — 99284 EMERGENCY DEPT VISIT MOD MDM: CPT | Performed by: EMERGENCY MEDICINE

## 2020-03-06 PROCEDURE — 64450 NJX AA&/STRD OTHER PN/BRANCH: CPT | Performed by: EMERGENCY MEDICINE

## 2020-03-06 RX ORDER — BUPIVACAINE HYDROCHLORIDE 5 MG/ML
10 INJECTION, SOLUTION EPIDURAL; INTRACAUDAL ONCE
Status: COMPLETED | OUTPATIENT
Start: 2020-03-06 | End: 2020-03-06

## 2020-03-06 RX ADMIN — BUPIVACAINE HYDROCHLORIDE 10 ML: 5 INJECTION, SOLUTION EPIDURAL; INTRACAUDAL; PERINEURAL at 00:39

## 2020-03-06 NOTE — ED ATTENDING ATTESTATION
3/5/2020  Martha Ford DO, saw and evaluated the patient  I have discussed the patient with the resident/non-physician practitioner and agree with the resident's/non-physician practitioner's findings, Plan of Care, and MDM as documented in the resident's/non-physician practitioner's note, except where noted  All available labs and Radiology studies were reviewed  I was present for key portions of any procedure(s) performed by the resident/non-physician practitioner and I was immediately available to provide assistance  At this point I agree with the current assessment done in the Emergency Department  I have conducted an independent evaluation of this patient a history and physical is as follows:    58-year-old female presents emergency department with toothache  She reports right bottom posterior molar being painful  She has taken acetaminophen and ibuprofen today  Past Medical History:   Diagnosis Date    Allergic rhinitis     Eczema     Kidney stone        /80 (BP Location: Left arm)   Pulse 81   Temp 98 1 °F (36 7 °C) (Oral)   Ht 5' 3" (1 6 m)   Wt 65 8 kg (145 lb)   SpO2 98%   BMI 25 69 kg/m²   Patient is no acute distress, airway intact in secretions, no obvious signs of infection, there appears to be impacted right, inferior, posterior molar that is palpable and somewhat visible  There is no gingival swelling or erythema  There is no sign of any abscess  Nerve block  Patient reports subsequent improvement in pain  She will call her dentist in the morning          ED Course         Critical Care Time  Procedures

## 2020-03-06 NOTE — ED PROVIDER NOTES
History  Chief Complaint   Patient presents with    Dental Pain     Began with lower right dental pain yesterday, today the pain increased  Sensitivity to cold  Is a 29-year-old female who presents for right lower dental pain  Patient says that the pain started about 2 days ago and has gotten progressively worse  She has been taking Tylenol and Motrin  She says her last dose of Tylenol was about 5 minutes ago  She denies any fevers, chills, facial swelling  She has been able to tolerate p o  Without difficulty  Patient saw her dentist Fozia Hobbs few months ago for some cavities  Prior to Admission Medications   Prescriptions Last Dose Informant Patient Reported? Taking? Norethin Ace-Eth Estrad-FE (MINASTRIN 24 FE) 1-20 MG-MCG(24) CHEW 3/6/2020 at Unknown time  No Yes   Sig: Chew 1 tablet daily for 28 days   cyclobenzaprine (FLEXERIL) 5 mg tablet   No No   Sig: Take 1 tablet (5 mg total) by mouth daily at bedtime   naproxen (NAPROSYN) 500 mg tablet   No No   Sig: Take 1 tablet (500 mg total) by mouth 2 (two) times a day with meals      Facility-Administered Medications: None       Past Medical History:   Diagnosis Date    Allergic rhinitis     Eczema     Kidney stone        History reviewed  No pertinent surgical history  Family History   Problem Relation Age of Onset    No Known Problems Mother     No Known Problems Father     Mental illness Neg Hx     Substance Abuse Neg Hx      I have reviewed and agree with the history as documented  E-Cigarette/Vaping    E-Cigarette Use Never User      E-Cigarette/Vaping Substances     Social History     Tobacco Use    Smoking status: Never Smoker    Smokeless tobacco: Never Used   Substance Use Topics    Alcohol use: No    Drug use: No        Review of Systems   Constitutional: Negative for chills, diaphoresis and fever  HENT: Positive for dental problem  Negative for congestion, sinus pressure, sore throat and trouble swallowing      Eyes: Negative for pain, discharge and itching  Respiratory: Negative for cough, chest tightness, shortness of breath and wheezing  Cardiovascular: Negative for chest pain, palpitations and leg swelling  Gastrointestinal: Negative for abdominal distention, abdominal pain, blood in stool, diarrhea, nausea and vomiting  Endocrine: Negative for polyphagia and polyuria  Genitourinary: Negative for difficulty urinating, dysuria, flank pain, hematuria, pelvic pain and vaginal bleeding  Musculoskeletal: Negative for arthralgias and back pain  Skin: Negative for rash  Neurological: Negative for dizziness, syncope, weakness, light-headedness and headaches  Physical Exam  ED Triage Vitals [03/05/20 2155]   Temperature Pulse Resp Blood Pressure SpO2   98 1 °F (36 7 °C) 81 -- 139/80 98 %      Temp Source Heart Rate Source Patient Position - Orthostatic VS BP Location FiO2 (%)   Oral Monitor -- Left arm --      Pain Score       6             Orthostatic Vital Signs  Vitals:    03/05/20 2155   BP: 139/80   Pulse: 81       Physical Exam   Constitutional: She is oriented to person, place, and time  She appears well-developed and well-nourished  No distress  HENT:   Head: Normocephalic and atraumatic  Right Ear: External ear normal    Left Ear: External ear normal    Mouth/Throat: No oropharyngeal exudate  Eyes: Pupils are equal, round, and reactive to light  Conjunctivae are normal    Neck: Normal range of motion  Neck supple  Cardiovascular: Normal rate, regular rhythm, normal heart sounds and intact distal pulses  Exam reveals no gallop and no friction rub  No murmur heard  Pulmonary/Chest: Effort normal and breath sounds normal  No respiratory distress  She has no wheezes  She has no rales  Abdominal: Soft  She exhibits no distension  There is no tenderness  There is no guarding  Musculoskeletal: Normal range of motion  She exhibits no edema, tenderness or deformity     Lymphadenopathy:     She has no cervical adenopathy  Neurological: She is alert and oriented to person, place, and time  No cranial nerve deficit or sensory deficit  She exhibits normal muscle tone  Skin: Skin is warm and dry  Psychiatric: She has a normal mood and affect  Nursing note and vitals reviewed  ED Medications  Medications   bupivacaine (PF) (MARCAINE) 0 5 % injection 10 mL (10 mL Infiltration Given by Other 3/6/20 0039)       Diagnostic Studies  Results Reviewed     None                 No orders to display         Procedures  Nerve block  Date/Time: 3/6/2020 5:33 AM  Performed by: Jared Roque DO  Authorized by: Jared Roque DO     Patient location:  ED  Other Assisting Provider: No    Consent:     Consent obtained:  Verbal    Consent given by:  Patient    Risks discussed: Allergic reaction, infection and bleeding    Alternatives discussed:  No treatment  Universal protocol:     Procedure explained and questions answered to patient or proxy's satisfaction: yes      Patient identity confirmed:  Verbally with patient  Indications:     Indications:  Pain relief  Location:     Body area:  Head    Head nerve blocked: Right inferior alveolar nerve  Nerve type:  Peripheral    Laterality:  Left  Procedure details (see MAR for exact dosages): Block needle gauge:  25 G    Anesthetic injected:  Bupivacaine 0 5% w/o epi    Steroid injected:  None    Additive injected:  None    Injection procedure:  Anatomic landmarks identified          ED Course                               MDM  Number of Diagnoses or Management Options  Diagnosis management comments: 24-year-old female presenting for right lower dental pain  Progressive over the last 2 days  No fevers, chills, facial swelling  No obvious abscess or redness on exam   Offered patient dental block which she accepted    Told patient that she needs to follow up with her dentist, she plans on calling in the morning        Disposition  Final diagnoses:   Amina Job Time reflects when diagnosis was documented in both MDM as applicable and the Disposition within this note     Time User Action Codes Description Comment    3/6/2020 12:46 AM Tom Nettles Add [[id number]] [address] [address]       ED Disposition     ED Disposition Condition Date/Time Comment    Discharge Stable Fri Mar 6, 2020 12:46 AM Mike Walker discharge to home/self care  Follow-up Information     Follow up With Specialties Details Why Contact Info    your dentist  Schedule an appointment as soon as possible for a visit  For follow up of dental pain           Discharge Medication List as of 3/6/2020 12:47 AM      CONTINUE these medications which have NOT CHANGED    Details   Norethin Ace-Eth Estrad-FE (MINASTRIN 24 FE) 1-20 MG-MCG(24) CHEW Chew 1 tablet daily for 28 days, Starting Tue 1/21/2020, Until Fri 3/6/2020, Normal      cyclobenzaprine (FLEXERIL) 5 mg tablet Take 1 tablet (5 mg total) by mouth daily at bedtime, Starting Fri 1/24/2020, Normal      naproxen (NAPROSYN) 500 mg tablet Take 1 tablet (500 mg total) by mouth 2 (two) times a day with meals, Starting Tue 1/21/2020, Print           No discharge procedures on file  PDMP Review     None           ED Provider  Attending physically available and evaluated Mike Walker  MARYJO managed the patient along with the ED Attending      Electronically Signed by         Uvaldo Potts DO  03/06/20 3038

## 2020-06-26 ENCOUNTER — OFFICE VISIT (OUTPATIENT)
Dept: OBGYN CLINIC | Facility: CLINIC | Age: 20
End: 2020-06-26
Payer: COMMERCIAL

## 2020-06-26 VITALS
BODY MASS INDEX: 26.68 KG/M2 | DIASTOLIC BLOOD PRESSURE: 82 MMHG | WEIGHT: 150.6 LBS | TEMPERATURE: 96 F | SYSTOLIC BLOOD PRESSURE: 124 MMHG

## 2020-06-26 DIAGNOSIS — Z87.442 HISTORY OF NEPHROLITHIASIS: ICD-10-CM

## 2020-06-26 DIAGNOSIS — R10.32 LLQ PAIN: Primary | ICD-10-CM

## 2020-06-26 PROCEDURE — 87077 CULTURE AEROBIC IDENTIFY: CPT | Performed by: OBSTETRICS & GYNECOLOGY

## 2020-06-26 PROCEDURE — 1036F TOBACCO NON-USER: CPT | Performed by: OBSTETRICS & GYNECOLOGY

## 2020-06-26 PROCEDURE — 87086 URINE CULTURE/COLONY COUNT: CPT | Performed by: OBSTETRICS & GYNECOLOGY

## 2020-06-26 PROCEDURE — 99213 OFFICE O/P EST LOW 20 MIN: CPT | Performed by: OBSTETRICS & GYNECOLOGY

## 2020-06-26 PROCEDURE — 87186 SC STD MICRODIL/AGAR DIL: CPT | Performed by: OBSTETRICS & GYNECOLOGY

## 2020-06-29 ENCOUNTER — TELEPHONE (OUTPATIENT)
Dept: LAB | Facility: HOSPITAL | Age: 20
End: 2020-06-29

## 2020-06-29 ENCOUNTER — TELEPHONE (OUTPATIENT)
Dept: OBGYN CLINIC | Facility: CLINIC | Age: 20
End: 2020-06-29

## 2020-06-29 NOTE — TELEPHONE ENCOUNTER
I called Heriberto Simmons about this and sent her a note that culture is run  Heriberto Simmons said it is a lab error

## 2020-06-29 NOTE — TELEPHONE ENCOUNTER
This was a note we received about an hr ago  I called Topher Ag and she is looking into it  I just lm at  - they are able to do culture    Await call from Topher Ag

## 2020-07-01 ENCOUNTER — TELEPHONE (OUTPATIENT)
Dept: OBGYN CLINIC | Facility: CLINIC | Age: 20
End: 2020-07-01

## 2020-07-01 DIAGNOSIS — N30.00 ACUTE CYSTITIS WITHOUT HEMATURIA: Primary | ICD-10-CM

## 2020-07-01 LAB — BACTERIA UR CULT: ABNORMAL

## 2020-07-01 RX ORDER — NITROFURANTOIN 25; 75 MG/1; MG/1
100 CAPSULE ORAL 2 TIMES DAILY
Qty: 14 CAPSULE | Refills: 0 | Status: SHIPPED | OUTPATIENT
Start: 2020-07-01 | End: 2020-07-08

## 2020-07-01 NOTE — TELEPHONE ENCOUNTER
----- Message from Emerson Colorado MD sent at 7/1/2020  9:41 AM EDT -----  Please notify patient of results  I sent in a rx for macrobid  Will need a AURY

## 2020-07-02 ENCOUNTER — HOSPITAL ENCOUNTER (OUTPATIENT)
Dept: RADIOLOGY | Age: 20
Discharge: HOME | End: 2020-07-02
Payer: COMMERCIAL

## 2020-07-02 DIAGNOSIS — N20.0 CALCULUS OF KIDNEY: ICD-10-CM

## 2020-07-02 DIAGNOSIS — R10.32 LLQ PAIN: ICD-10-CM

## 2020-07-02 PROCEDURE — 76770 US EXAM ABDO BACK WALL COMP: CPT

## 2020-07-02 PROCEDURE — 76830 TRANSVAGINAL US NON-OB: CPT

## 2020-07-02 PROCEDURE — 76856 US EXAM PELVIC COMPLETE: CPT

## 2020-07-08 ENCOUNTER — TELEPHONE (OUTPATIENT)
Dept: OBGYN CLINIC | Facility: CLINIC | Age: 20
End: 2020-07-08

## 2020-07-08 NOTE — TELEPHONE ENCOUNTER
----- Message from Chayito Beckham MD sent at 7/8/2020 11:38 AM EDT -----  Notify normal; we will awake next urine culture to make any further treatment plans

## 2020-07-08 NOTE — TELEPHONE ENCOUNTER
LM per hippa communication consent that u/s was normal and we are awaiting urine culture results to make further treatment plans

## 2020-10-05 ENCOUNTER — TELEPHONE (OUTPATIENT)
Dept: OBGYN CLINIC | Facility: CLINIC | Age: 20
End: 2020-10-05

## 2020-12-02 DIAGNOSIS — Z30.41 ENCOUNTER FOR SURVEILLANCE OF CONTRACEPTIVE PILLS: ICD-10-CM

## 2020-12-02 RX ORDER — NORETHINDRONE ACETATE AND ETHINYL ESTRADIOL AND FERROUS FUMARATE 1MG-20(24)
1 KIT ORAL DAILY
Qty: 84 TABLET | Refills: 0 | Status: SHIPPED | OUTPATIENT
Start: 2020-12-02 | End: 2021-01-25 | Stop reason: SDUPTHER

## 2021-01-25 ENCOUNTER — ANNUAL EXAM (OUTPATIENT)
Dept: OBGYN CLINIC | Facility: CLINIC | Age: 21
End: 2021-01-25
Payer: COMMERCIAL

## 2021-01-25 VITALS
DIASTOLIC BLOOD PRESSURE: 78 MMHG | SYSTOLIC BLOOD PRESSURE: 102 MMHG | WEIGHT: 154.2 LBS | BODY MASS INDEX: 27.32 KG/M2 | HEIGHT: 63 IN

## 2021-01-25 DIAGNOSIS — Z11.3 SCREEN FOR STD (SEXUALLY TRANSMITTED DISEASE): ICD-10-CM

## 2021-01-25 DIAGNOSIS — Z30.41 ENCOUNTER FOR SURVEILLANCE OF CONTRACEPTIVE PILLS: ICD-10-CM

## 2021-01-25 DIAGNOSIS — Z31.69 PRE-CONCEPTION COUNSELING: ICD-10-CM

## 2021-01-25 DIAGNOSIS — Z01.419 WOMEN'S ANNUAL ROUTINE GYNECOLOGICAL EXAMINATION: Primary | ICD-10-CM

## 2021-01-25 DIAGNOSIS — Z11.3 SCREENING FOR STD (SEXUALLY TRANSMITTED DISEASE): ICD-10-CM

## 2021-01-25 DIAGNOSIS — F17.200 SMOKING: ICD-10-CM

## 2021-01-25 PROBLEM — IMO0001 SMOKING: Status: ACTIVE | Noted: 2021-01-25

## 2021-01-25 PROCEDURE — 87591 N.GONORRHOEAE DNA AMP PROB: CPT | Performed by: STUDENT IN AN ORGANIZED HEALTH CARE EDUCATION/TRAINING PROGRAM

## 2021-01-25 PROCEDURE — G0124 SCREEN C/V THIN LAYER BY MD: HCPCS | Performed by: PATHOLOGY

## 2021-01-25 PROCEDURE — 87491 CHLMYD TRACH DNA AMP PROBE: CPT | Performed by: STUDENT IN AN ORGANIZED HEALTH CARE EDUCATION/TRAINING PROGRAM

## 2021-01-25 PROCEDURE — 99395 PREV VISIT EST AGE 18-39: CPT | Performed by: STUDENT IN AN ORGANIZED HEALTH CARE EDUCATION/TRAINING PROGRAM

## 2021-01-25 PROCEDURE — G0145 SCR C/V CYTO,THINLAYER,RESCR: HCPCS | Performed by: PATHOLOGY

## 2021-01-25 PROCEDURE — 87624 HPV HI-RISK TYP POOLED RSLT: CPT | Performed by: STUDENT IN AN ORGANIZED HEALTH CARE EDUCATION/TRAINING PROGRAM

## 2021-01-25 RX ORDER — SODIUM FLUORIDE 5 MG/G
GEL, DENTIFRICE DENTAL
COMMUNITY
Start: 2021-01-05 | End: 2022-01-24 | Stop reason: ALTCHOICE

## 2021-01-25 RX ORDER — NORETHINDRONE ACETATE AND ETHINYL ESTRADIOL AND FERROUS FUMARATE 1MG-20(24)
1 KIT ORAL DAILY
Qty: 84 TABLET | Refills: 0 | Status: SHIPPED | OUTPATIENT
Start: 2021-01-25 | End: 2021-02-26

## 2021-01-25 NOTE — ASSESSMENT & PLAN NOTE
Reviewed starting PNV when ready  Congratulated on efforts to stop smoking  Also reviewed avoiding teratogens  Reviewed ideal BMI  Offered preconception carrier screening, will consider

## 2021-01-25 NOTE — PROGRESS NOTES
Established patient annual exam - Gynecology   Tyron Macedo 23 y o  female MRN: [id number]  227 M  [facility]  Encounter: [id number]    Chief complaint: annual exam    Assessment/Plan     23 y o  Renny Dana with normal annual gynecologic examination  Diagnoses and all orders for this visit:    Women's annual routine gynecological examination  -     Liquid-based pap, screening    Encounter for surveillance of contraceptive pills  -     Norethin Ace-Eth Estrad-FE (Minastrin 24 Fe) 1-20 MG-MCG(24) CHEW; Chew 1 tablet daily for 28 days    Smoking    Pre-conception counseling    Other orders  -     SODIUM FLUORIDE, DENTAL GEL, 1 1 % GEL  -     Norethin Ace-Eth Estrad-FE (MINASTRIN 24 FE PO); Take by mouth      Smoking  Started smoking young at 12, as much as 1 PPD  Currently 1/2 PPD  Had previously quit for 4-5 months, looking forward to quitting again  Reviewed increased risk DVT stroke with smoking and estrogen    Pre-conception counseling  Reviewed starting PNV when ready  Congratulated on efforts to stop smoking  Also reviewed avoiding teratogens  Reviewed ideal BMI  Offered preconception carrier screening, will consider    Normal findings on routine gynecologic exam today  Reviewed annual screening mammogram and annual clinical breast exam   Discussed ASCCP guidelines and Pap smear with cotesting frequency, first performed today  STD/STI testing offered, GCCT only today - reports low risk  Plan to continue current contraceptive method per patient preference (cOCP)  Encouraged daily calcium and vitamin D intake was well as weight bearing exercise daily for bone health  Follow up PRN or for one year annual exam     --------------------------------------------------------    History of Present Illness     Tyron Macedo is a 23 y o  female  No LMP recorded (lmp unknown)  (Menstrual status: Birth Control)   cOCP for contraception who presents for annual examination  Concerns today: maybe considering getting pregnant in the next year or two    REVIEW OF SYSTEMS  CONSTITUTIONAL:  No weight loss, fever, chills, weakness  HEENT: No visual loss, blurred vision  SKIN: No rash or itching  CARDIOVASCULAR: No chest pain, chest pressure, or chest discomfort  RESPIRATORY: No shortness of breath, cough or sputum  GASTROINTESTINAL: No nausea, emesis, or diarrhea  NEUROLOGICAL: No headache, dizziness, syncope, paralysis  MUSCUOSKELETAL: No muscle, back pain, joint stiffness or bruising  INFECTIOUS: No fever, chills  PSYCHIATRIC: No disorder of thought or mood  HEMATOLOGIC: No easy bruising or bleeding  GYN: No abnormal bleeding  No involuntary urine loss  No pain with intercourse  No vaginal dryness    Past Medical History:   Diagnosis Date    Allergic rhinitis     Eczema     Kidney stone        Patient Active Problem List   Diagnosis    Head injury    Intractable acute post-traumatic headache    Encounter for gynecological examination (general) (routine) without abnormal findings    Encounter for surveillance of contraceptive pills    Musculoskeletal pain of right upper extremity    Trapezius muscle spasm    Smoking    Pre-conception counseling       History reviewed  No pertinent surgical history  OB History        0    Para   0    Term   0       0    AB   0    Living   0       SAB   0    TAB   0    Ectopic   0    Multiple   0    Live Births   0               The patient has never been pregnant  GYN History  Menarche age 14    LMP amenorrheic on cOCP    No history abnormal Pap smears  No history of cryotherapy, LEEP, or cold knife cone of the cervix    Health maintenance  Last pap smear: Not on file first one today  Bone density scan: n/a  Last mammogram: Not on file n/a  Last colonoscopy: Not on file n/a  HPV vaccination?: yes    Family History   Problem Relation Age of Onset    No Known Problems Mother     No Known Problems Father     Mental illness Neg Hx     Substance Abuse Neg Hx      Social History   Social History     Substance and Sexual Activity   Alcohol Use Yes    Comment: occ     Social History     Substance and Sexual Activity   Drug Use No     Social History     Tobacco Use   Smoking Status Current Every Day Smoker    Types: Cigarettes   Smokeless Tobacco Never Used     Sexually active? yes  Current sexual partner(s): boyfriend  History of STD: chlamydia 2 years ago  Interested in STD screening today? Yes, GCCT only  Concerns about sex: no    Occupation: working for [employer] - previously quit [employer] during pandemic    Current Outpatient Medications:     Norethin Ace-Eth Estrad-FE (MINASTRIN 24 FE PO), Take by mouth, Disp: , Rfl:     Norethin Ace-Eth Estrad-FE (Minastrin 24 Fe) 1-20 MG-MCG(24) CHEW, Chew 1 tablet daily for 28 days, Disp: 84 tablet, Rfl: 0    SODIUM FLUORIDE, DENTAL GEL, 1 1 % GEL, , Disp: , Rfl:     No Known Allergies    Objective   Vitals: Blood pressure 102/78, height 5' 3" (1 6 m), weight 69 9 kg (154 lb 3 2 oz)  Body mass index is 27 32 kg/m²  General: NAD, AAOx3  Heart: RRR  Lungs: CTAB  Neck: supple, no thyromegaly or thyroid nodules appreciated    Breast: nipples everted bilaterally, no skin changes  No dimpling, redness, or erythema  No breast masses or axillary masses bilaterally  Unchanged skin of left breast, Skin of left breast, lesion 1 cm abnormal borders, superficial, smooth of left berast at 1:00 position, additional 12:00 lesion similar characterization      Abdomen: soft, non-distended, non tender to palpation  Well-healed navel ring  Extremities: non-tender to palpation  Speculum exam: Normal appearing external genitalia, normal hair distribution  Urethra well-suspended, no clitoromegaly noted  Vagina pink moist well-rugated  Physiologic  discharge noted  Cervix without lesion, nulliparous appearing  no blood in vaginal vault    Pelvic exam: no cervical motion tenderness    No adnexal masses or tenderness  anteverted uterus, normal size  Lab Results:   No visits with results within 1 Day(s) from this visit     Latest known visit with results is:   Office Visit on 06/26/2020   Component Date Value    Urine Culture 06/26/2020 >100,000 cfu/ml Escherichia coli*

## 2021-01-25 NOTE — ASSESSMENT & PLAN NOTE
Started smoking young at 12, as much as 1 PPD  Currently 1/2 PPD  Had previously quit for 4-5 months, looking forward to quitting again  Reviewed increased risk DVT stroke with smoking and estrogen

## 2021-01-27 LAB
C TRACH DNA SPEC QL NAA+PROBE: NEGATIVE
N GONORRHOEA DNA SPEC QL NAA+PROBE: NEGATIVE

## 2021-01-29 ENCOUNTER — TELEPHONE (OUTPATIENT)
Dept: OBGYN CLINIC | Facility: CLINIC | Age: 21
End: 2021-01-29

## 2021-01-29 LAB
LAB AP GYN PRIMARY INTERPRETATION: ABNORMAL
Lab: ABNORMAL
PATH INTERP SPEC-IMP: ABNORMAL

## 2021-01-29 NOTE — TELEPHONE ENCOUNTER
Spoke to pt and let her know GC/CT was neg, looks like HPV is still active  Gave pt a little reassurance until they are reviewed

## 2021-01-29 NOTE — TELEPHONE ENCOUNTER
Still awaiting HPV testing, will give us further information on next steps (possible need for colposcopy, etc)   Thanks! -AMM

## 2021-01-29 NOTE — TELEPHONE ENCOUNTER
Patient saw some recent test results were available in MyChart and was calling in for someone to interpret them      Best number ending in: [phone]

## 2021-01-30 LAB
HPV HR 12 DNA CVX QL NAA+PROBE: NEGATIVE
HPV16 DNA CVX QL NAA+PROBE: NEGATIVE
HPV18 DNA CVX QL NAA+PROBE: NEGATIVE

## 2021-02-22 ENCOUNTER — TELEMEDICINE (OUTPATIENT)
Dept: OBGYN CLINIC | Facility: CLINIC | Age: 21
End: 2021-02-22
Payer: COMMERCIAL

## 2021-02-22 DIAGNOSIS — Z30.41 ENCOUNTER FOR SURVEILLANCE OF CONTRACEPTIVE PILLS: Primary | ICD-10-CM

## 2021-02-22 PROCEDURE — 99212 OFFICE O/P EST SF 10 MIN: CPT | Performed by: STUDENT IN AN ORGANIZED HEALTH CARE EDUCATION/TRAINING PROGRAM

## 2021-02-22 NOTE — ASSESSMENT & PLAN NOTE
Working on decreasing smoking amount!  Congratulated on efforts  Notes nausea and cramping worse with current brand of cOCP ([facility] did not have her previous brand available)  Reviewed alternative options to cOCP, hesitant to switch - would like to try going back on specific brand for cOCP  Has missed some pills - reviewed importance taking regularly, advised to take home UPT  Plan for Socorro to message via [email or web address] with name of brand cOCP and result of UPT

## 2021-02-22 NOTE — PROGRESS NOTES
Virtual Regular Visit      Assessment/Plan:    Problem List Items Addressed This Visit        Other    Encounter for surveillance of contraceptive pills - Primary     Working on decreasing smoking amount! Congratulated on efforts  Notes nausea and cramping worse with current brand of cOCP ([facility] did not have her previous brand available)  Reviewed alternative options to cOCP, hesitant to switch - would like to try going back on specific brand for cOCP  Has missed some pills - reviewed importance taking regularly, advised to take home UPT  Plan for Socorro to message via [address] with name of brand cOCP and result of UPT                    Reason for visit is   Chief Complaint   Patient presents with    Virtual Regular Visit     pt has been experiencing some nausea and cramping while taking her BC pill for the past two weeks  she is sexually active and does not always take pill reguarly, admitted to missing some days  i asked if pt has taken a home pregnancy test just to rule out that she not pregnant- she hasnt, i infomed her that dr Esteban Mckeon most likely would advise this  has headaches once in a while  no other symptoms   Virtual Regular Visit        Encounter provider Stephanie Rogers MD    Provider located at [place] [id number]      Recent Visits  No visits were found meeting these conditions  Showing recent visits within past 7 days and meeting all other requirements     Today's Visits  Date Type Provider Dept   02/22/21 Telemedicine Stephanie Rogers MD [facility] today's visits and meeting all other requirements     Future Appointments  No visits were found meeting these conditions  Showing future appointments within next 150 days and meeting all other requirements        The patient was identified by name and date of birth   Gilberto Ford was informed that this is a telemedicine visit and that the visit is being conducted through "[employer]" and patient was informed that this is a secure, HIPAA-compliant platform  She agrees to proceed     My office door was closed  No one else was in the room  She acknowledged consent and understanding of privacy and security of the video platform  The patient has agreed to participate and understands they can discontinue the visit at any time  Patient is aware this is a billable service  Mick Mclaughlin is a 23 y o  female    who presents to discuss side effects of her cOCP  Notes that since [facility] changed the brand of her pill that she has had side effects of nausea and cramping  Admits to not taking regularly but otherwise not interested in alternative form of contraception  Past Medical History:   Diagnosis Date    Allergic rhinitis     Eczema     Kidney stone        History reviewed  No pertinent surgical history  Current Outpatient Medications   Medication Sig Dispense Refill    Norethin Ace-Eth Estrad-FE (Minastrin 24 Fe) 1-20 MG-MCG(24) CHEW Chew 1 tablet daily for 28 days 84 tablet 0    SODIUM FLUORIDE, DENTAL GEL, 1 1 % GEL       Norethin Ace-Eth Estrad-FE (MINASTRIN 24 FE PO) Take by mouth       No current facility-administered medications for this visit  No Known Allergies    Review of Systems   Gastrointestinal: Positive for nausea  Genitourinary: Positive for menstrual problem  All other systems reviewed and are negative  Video Exam    There were no vitals filed for this visit  Physical Exam  Constitutional:       Appearance: Normal appearance  Pulmonary:      Effort: Pulmonary effort is normal    Neurological:      General: No focal deficit present  Mental Status: She is oriented to person, place, and time     Psychiatric:         Mood and Affect: Mood normal           I spent 4 minutes directly with the patient during this visit      VIRTUAL VISIT Surinder Hanna Wellington Galeana acknowledges that she has consented to an online visit or consultation  She understands that the online visit is based solely on information provided by her, and that, in the absence of a face-to-face physical evaluation by the physician, the diagnosis she receives is both limited and provisional in terms of accuracy and completeness  This is not intended to replace a full medical face-to-face evaluation by the physician  Jag Dior understands and accepts these terms

## 2021-02-23 ENCOUNTER — TELEPHONE (OUTPATIENT)
Dept: OBGYN CLINIC | Facility: CLINIC | Age: 21
End: 2021-02-23

## 2021-02-23 DIAGNOSIS — Z32.00 POSSIBLE PREGNANCY, NOT CONFIRMED: Primary | ICD-10-CM

## 2021-02-23 NOTE — TELEPHONE ENCOUNTER
Minda De Leon took a pregnancy test yesterday, very faint line, today it was negative  She said she was told to message you about this

## 2021-02-24 ENCOUNTER — LAB (OUTPATIENT)
Dept: LAB | Facility: HOSPITAL | Age: 21
End: 2021-02-24
Attending: STUDENT IN AN ORGANIZED HEALTH CARE EDUCATION/TRAINING PROGRAM
Payer: COMMERCIAL

## 2021-02-24 DIAGNOSIS — Z32.00 POSSIBLE PREGNANCY, NOT CONFIRMED: ICD-10-CM

## 2021-02-24 LAB — B-HCG SERPL-ACNC: <2 MIU/ML

## 2021-02-24 PROCEDURE — 84702 CHORIONIC GONADOTROPIN TEST: CPT

## 2021-02-24 PROCEDURE — 36415 COLL VENOUS BLD VENIPUNCTURE: CPT

## 2021-02-24 NOTE — TELEPHONE ENCOUNTER
Spoke to pt she will goto  Lab today and she said she is not sure of the name of the OC she was on before

## 2021-02-24 NOTE — TELEPHONE ENCOUNTER
Please have Socorro do a quantitative bHCG  Afterwards I will be happy to order her the type of cOCP she was on before (if she tells me the type) thank you!

## 2021-02-25 ENCOUNTER — TELEPHONE (OUTPATIENT)
Dept: OBGYN CLINIC | Facility: CLINIC | Age: 21
End: 2021-02-25

## 2021-02-25 NOTE — TELEPHONE ENCOUNTER
I lm - hcg neg  On pts chart , last oc entered was minastrin? Pt said she was on minastrin 24  ( or generic liks it) and does not want that kind  She said to pick another oc for her to try    Thanks

## 2021-02-26 DIAGNOSIS — Z30.41 ENCOUNTER FOR SURVEILLANCE OF CONTRACEPTIVE PILLS: Primary | ICD-10-CM

## 2021-02-26 RX ORDER — NORETHINDRONE ACETATE AND ETHINYL ESTRADIOL 1MG-20(21)
1 KIT ORAL DAILY
Qty: 84 TABLET | Refills: 2 | Status: SHIPPED | OUTPATIENT
Start: 2021-02-26 | End: 2021-06-04 | Stop reason: SDUPTHER

## 2021-03-10 ENCOUNTER — OFFICE VISIT (OUTPATIENT)
Dept: OBGYN CLINIC | Facility: CLINIC | Age: 21
End: 2021-03-10
Payer: COMMERCIAL

## 2021-03-10 VITALS — SYSTOLIC BLOOD PRESSURE: 116 MMHG | DIASTOLIC BLOOD PRESSURE: 82 MMHG | BODY MASS INDEX: 26.43 KG/M2 | WEIGHT: 149.2 LBS

## 2021-03-10 DIAGNOSIS — Z30.41 ENCOUNTER FOR SURVEILLANCE OF CONTRACEPTIVE PILLS: ICD-10-CM

## 2021-03-10 DIAGNOSIS — Z11.3 SCREENING FOR STDS (SEXUALLY TRANSMITTED DISEASES): Primary | ICD-10-CM

## 2021-03-10 PROCEDURE — 87591 N.GONORRHOEAE DNA AMP PROB: CPT | Performed by: NURSE PRACTITIONER

## 2021-03-10 PROCEDURE — 87491 CHLMYD TRACH DNA AMP PROBE: CPT | Performed by: NURSE PRACTITIONER

## 2021-03-10 PROCEDURE — 99213 OFFICE O/P EST LOW 20 MIN: CPT | Performed by: NURSE PRACTITIONER

## 2021-03-10 NOTE — PROGRESS NOTES
Assessment/Plan:    Screening for STDs (sexually transmitted diseases)  Desires GC/CT only  Screening only-no symptoms  Had last contact 2 weeks ago  Declines BW testing  Encounter for surveillance of contraceptive pills  Stopped OCP 2 weeks ago  Discussed restarting with day 1 of next cycle  Agrees to plan  Diagnoses and all orders for this visit:    Screening for STDs (sexually transmitted diseases)  -     Chlamydia/GC amplified DNA by PCR    Encounter for surveillance of contraceptive pills        Subjective:      Patient ID: Edi Alexandre is a 23 y o  female  Kary Doan is a 23year old who presents for STI testing (GC/CT only)   She denies abdominal/pelvic pain, UTI symptoms or unusual vaginal discharge, itching or odor  She stopped her pill 2 weeks ago due to cramping and wants to restart  She is not currently sexually active  Advised on timing  The following portions of the patient's history were reviewed and updated as appropriate: allergies, current medications, past family history, past medical history, past social history, past surgical history and problem list     Review of Systems   Constitutional: Negative for chills, fatigue and fever  HENT: Negative for congestion, sinus pressure and sneezing  Respiratory: Negative for cough and shortness of breath  Gastrointestinal: Negative for abdominal pain, constipation, diarrhea and nausea  Endocrine: Negative  Genitourinary: Negative for dyspareunia, menstrual problem, pelvic pain, vaginal bleeding and vaginal discharge  Musculoskeletal: Negative  Skin: Negative  Neurological: Negative  Psychiatric/Behavioral: Negative  Objective:      /82 (BP Location: Left arm, Patient Position: Sitting, Cuff Size: Standard)   Wt 67 7 kg (149 lb 3 2 oz)   BMI 26 43 kg/m²          Physical Exam  Constitutional:       Appearance: Normal appearance  She is normal weight     Pulmonary:      Effort: Pulmonary effort is normal    Musculoskeletal: Normal range of motion  Skin:     General: Skin is warm and dry  Capillary Refill: Capillary refill takes less than 2 seconds  Coloration: Skin is not jaundiced  Neurological:      Mental Status: She is alert and oriented to person, place, and time     Psychiatric:         Mood and Affect: Mood normal          Behavior: Behavior normal

## 2021-03-11 LAB
C TRACH DNA SPEC QL NAA+PROBE: NEGATIVE
N GONORRHOEA DNA SPEC QL NAA+PROBE: NEGATIVE

## 2021-03-23 ENCOUNTER — TELEPHONE (OUTPATIENT)
Dept: OBGYN CLINIC | Facility: MEDICAL CENTER | Age: 21
End: 2021-03-23

## 2021-04-13 DIAGNOSIS — Z23 ENCOUNTER FOR IMMUNIZATION: ICD-10-CM

## 2021-05-03 ENCOUNTER — TELEPHONE (OUTPATIENT)
Dept: UROLOGY | Facility: AMBULATORY SURGERY CENTER | Age: 21
End: 2021-05-03

## 2021-05-03 DIAGNOSIS — R10.9 FLANK PAIN: Primary | ICD-10-CM

## 2021-05-03 NOTE — TELEPHONE ENCOUNTER
Pt's mother called the office reporting flank pain, urinary frequency/urggency and change in urine stream  Will order CT stone study and Urine testing

## 2021-05-04 ENCOUNTER — APPOINTMENT (OUTPATIENT)
Dept: LAB | Facility: HOSPITAL | Age: 21
End: 2021-05-04
Payer: COMMERCIAL

## 2021-05-04 DIAGNOSIS — R10.9 FLANK PAIN: ICD-10-CM

## 2021-05-04 LAB
BACTERIA UR QL AUTO: ABNORMAL /HPF
BILIRUB UR QL STRIP: NEGATIVE
CLARITY UR: ABNORMAL
COLOR UR: ABNORMAL
GLUCOSE UR STRIP-MCNC: NEGATIVE MG/DL
HGB UR QL STRIP.AUTO: ABNORMAL
KETONES UR STRIP-MCNC: ABNORMAL MG/DL
LEUKOCYTE ESTERASE UR QL STRIP: ABNORMAL
NITRITE UR QL STRIP: POSITIVE
NON-SQ EPI CELLS URNS QL MICRO: ABNORMAL /HPF
PH UR STRIP.AUTO: 6 [PH]
PROT UR STRIP-MCNC: ABNORMAL MG/DL
RBC #/AREA URNS AUTO: ABNORMAL /HPF
SP GR UR STRIP.AUTO: 1.02 (ref 1–1.03)
UROBILINOGEN UR QL STRIP.AUTO: 1 E.U./DL
WBC #/AREA URNS AUTO: ABNORMAL /HPF

## 2021-05-04 PROCEDURE — 87086 URINE CULTURE/COLONY COUNT: CPT

## 2021-05-04 PROCEDURE — 81001 URINALYSIS AUTO W/SCOPE: CPT

## 2021-05-04 PROCEDURE — 87077 CULTURE AEROBIC IDENTIFY: CPT

## 2021-05-04 PROCEDURE — 87186 SC STD MICRODIL/AGAR DIL: CPT

## 2021-05-05 ENCOUNTER — TELEPHONE (OUTPATIENT)
Dept: UROLOGY | Facility: AMBULATORY SURGERY CENTER | Age: 21
End: 2021-05-05

## 2021-05-05 DIAGNOSIS — N39.0 URINARY TRACT INFECTION WITHOUT HEMATURIA, SITE UNSPECIFIED: ICD-10-CM

## 2021-05-05 DIAGNOSIS — R10.9 FLANK PAIN: Primary | ICD-10-CM

## 2021-05-05 RX ORDER — CEPHALEXIN 500 MG/1
500 CAPSULE ORAL EVERY 6 HOURS SCHEDULED
Qty: 28 CAPSULE | Refills: 0 | Status: SHIPPED | OUTPATIENT
Start: 2021-05-05 | End: 2021-05-12

## 2021-05-06 ENCOUNTER — HOSPITAL ENCOUNTER (OUTPATIENT)
Dept: RADIOLOGY | Facility: HOSPITAL | Age: 21
Discharge: HOME | End: 2021-05-06
Payer: COMMERCIAL

## 2021-05-06 ENCOUNTER — TELEPHONE (OUTPATIENT)
Dept: UROLOGY | Facility: AMBULATORY SURGERY CENTER | Age: 21
End: 2021-05-06

## 2021-05-06 ENCOUNTER — TRANSCRIBE ORDERS (OUTPATIENT)
Dept: RADIOLOGY | Facility: HOSPITAL | Age: 21
End: 2021-05-06

## 2021-05-06 DIAGNOSIS — R10.9 FLANK PAIN: ICD-10-CM

## 2021-05-06 LAB — BACTERIA UR CULT: ABNORMAL

## 2021-05-06 PROCEDURE — G1004 CDSM NDSC: HCPCS

## 2021-05-06 PROCEDURE — 74176 CT ABD & PELVIS W/O CONTRAST: CPT

## 2021-05-06 NOTE — TELEPHONE ENCOUNTER
LM for Socorro that she passed the stone  She is to complete ABX    If she is still having symptoms after completion of ABX she is to call office

## 2021-05-06 NOTE — TELEPHONE ENCOUNTER
----- Message from Burtonchacha Ernestine 78  sent at 5/6/2021  1:34 PM EDT -----    Please call to notify patient that her CT stone study reveals possible recent passage of calculi  She is also being treated with antibiotics for recently diagnosed urinary tract infection, 2 days ago  Please advise patient to make sure that she c  omplete antibiotics as ordered  She should be evaluated in the office for concerns of worsening symptoms otherwise we can repeat testing if she is still symptomatic after completion of antibiotic therapy

## 2021-06-03 ENCOUNTER — TELEPHONE (OUTPATIENT)
Dept: OBGYN CLINIC | Facility: CLINIC | Age: 21
End: 2021-06-03

## 2021-06-03 NOTE — TELEPHONE ENCOUNTER
Pt called and left vm stating she needs refill on her BC  Please send to pharmacy and send to bin once done, thanks!

## 2021-06-04 DIAGNOSIS — Z30.41 ENCOUNTER FOR SURVEILLANCE OF CONTRACEPTIVE PILLS: ICD-10-CM

## 2021-06-04 RX ORDER — NORETHINDRONE ACETATE AND ETHINYL ESTRADIOL 1MG-20(21)
1 KIT ORAL DAILY
Qty: 84 TABLET | Refills: 0 | Status: SHIPPED | OUTPATIENT
Start: 2021-06-04 | End: 2021-08-20 | Stop reason: SDUPTHER

## 2021-08-20 DIAGNOSIS — Z30.41 ENCOUNTER FOR SURVEILLANCE OF CONTRACEPTIVE PILLS: ICD-10-CM

## 2021-08-20 RX ORDER — NORETHINDRONE ACETATE AND ETHINYL ESTRADIOL 1MG-20(21)
1 KIT ORAL DAILY
Qty: 84 TABLET | Refills: 1 | Status: SHIPPED | OUTPATIENT
Start: 2021-08-20 | End: 2022-01-24 | Stop reason: ALTCHOICE

## 2021-09-27 ENCOUNTER — OFFICE VISIT (OUTPATIENT)
Dept: URGENT CARE | Age: 21
End: 2021-09-27
Payer: COMMERCIAL

## 2021-09-27 ENCOUNTER — HOSPITAL ENCOUNTER (EMERGENCY)
Facility: HOSPITAL | Age: 21
Discharge: HOME | End: 2021-09-27
Payer: COMMERCIAL

## 2021-09-27 VITALS
SYSTOLIC BLOOD PRESSURE: 139 MMHG | HEART RATE: 84 BPM | TEMPERATURE: 98.4 F | OXYGEN SATURATION: 98 % | DIASTOLIC BLOOD PRESSURE: 76 MMHG | RESPIRATION RATE: 16 BRPM

## 2021-09-27 VITALS — BODY MASS INDEX: 25.87 KG/M2 | HEIGHT: 63 IN | WEIGHT: 146 LBS

## 2021-09-27 DIAGNOSIS — S61.309A NAIL AVULSION, FINGER, INITIAL ENCOUNTER: Primary | ICD-10-CM

## 2021-09-27 DIAGNOSIS — L03.012 INFECTION OF NAIL BED OF FINGER OF LEFT HAND: Primary | ICD-10-CM

## 2021-09-27 PROCEDURE — 99282 EMERGENCY DEPT VISIT SF MDM: CPT | Performed by: EMERGENCY MEDICINE

## 2021-09-27 PROCEDURE — 64450 NJX AA&/STRD OTHER PN/BRANCH: CPT | Performed by: EMERGENCY MEDICINE

## 2021-09-27 PROCEDURE — G0382 LEV 3 HOSP TYPE B ED VISIT: HCPCS | Performed by: PHYSICIAN ASSISTANT

## 2021-09-27 PROCEDURE — 99283 EMERGENCY DEPT VISIT LOW MDM: CPT

## 2021-09-27 PROCEDURE — 11730 AVULSION NAIL PLATE SIMPLE 1: CPT | Performed by: EMERGENCY MEDICINE

## 2021-09-27 RX ORDER — LIDOCAINE HYDROCHLORIDE 10 MG/ML
5 INJECTION, SOLUTION EPIDURAL; INFILTRATION; INTRACAUDAL; PERINEURAL ONCE
Status: COMPLETED | OUTPATIENT
Start: 2021-09-27 | End: 2021-09-27

## 2021-09-27 RX ADMIN — LIDOCAINE HYDROCHLORIDE 5 ML: 10 INJECTION, SOLUTION EPIDURAL; INFILTRATION; INTRACAUDAL; PERINEURAL at 21:30

## 2021-09-27 NOTE — PROGRESS NOTES
[facility]        NAME: Lesley Byrne is a 24 y o  female  : 2000    MRN: [id number]  DATE: 2021  TIME: 6:25 PM    Assessment and Plan   Infection of nail bed of finger of left hand [L03 012]  1  Infection of nail bed of finger of left hand  Transfer to other facility         Patient Instructions       Recommend patient go to the emergency room for possible nail removal   Follow up with PCP in 3-5 days  Proceed to  ER if symptoms worsen  Chief Complaint     Chief Complaint   Patient presents with    Nail Problem     Left pinky nail infection and pain  used a and d ointment only  History of Present Illness         Patient states on Friday she had hit her hand on something but does not remember what  She states that she noticed her nail was sticking out of her nail bed  She also has some  Other drainage pain and pain  She denies any other symptoms  Review of Systems   Review of Systems   Constitutional: Negative  HENT: Negative  Respiratory: Negative  Cardiovascular: Negative  Gastrointestinal: Negative  Musculoskeletal: Negative  Skin: Positive for wound  Neurological: Negative  Psychiatric/Behavioral: Negative  Current Medications       Current Outpatient Medications:     norethindrone-ethinyl estradiol (JUNEL FE 1/20) 1-20 MG-MCG per tablet, Take 1 tablet by mouth daily, Disp: 84 tablet, Rfl: 1    SODIUM FLUORIDE, DENTAL GEL, 1 1 % GEL, , Disp: , Rfl:     Current Allergies     Allergies as of 2021    (No Known Allergies)            The following portions of the patient's history were reviewed and updated as appropriate: allergies, current medications, past family history, past medical history, past social history, past surgical history and problem list      Past Medical History:   Diagnosis Date    Allergic rhinitis     Eczema     Kidney stone        No past surgical history on file      Family History   Problem Relation Age of Onset    No Known Problems Mother     No Known Problems Father     Mental illness Neg Hx     Substance Abuse Neg Hx          Medications have been verified  Objective   Ht 5' 3" (1 6 m)   Wt 66 2 kg (146 lb)   BMI 25 86 kg/m²        Physical Exam     Physical Exam  Vitals and nursing note reviewed  Constitutional:       General: She is not in acute distress  Appearance: Normal appearance  She is not ill-appearing or toxic-appearing  Cardiovascular:      Rate and Rhythm: Normal rate  Pulses: Normal pulses  Pulmonary:      Effort: Pulmonary effort is normal       Breath sounds: No wheezing  Skin:     General: Skin is warm and dry  Capillary Refill: Capillary refill takes less than 2 seconds  Comments:   Left 5th digit nail removed from nail but with surrounding erythema and drainage  Neurological:      General: No focal deficit present  Mental Status: She is alert and oriented to person, place, and time     Psychiatric:         Mood and Affect: Mood normal          Behavior: Behavior normal

## 2021-11-11 ENCOUNTER — HOSPITAL ENCOUNTER (OUTPATIENT)
Dept: RADIOLOGY | Facility: HOSPITAL | Age: 21
Discharge: HOME | End: 2021-11-11
Attending: UROLOGY
Payer: COMMERCIAL

## 2021-11-11 ENCOUNTER — TELEPHONE (OUTPATIENT)
Dept: UROLOGY | Facility: AMBULATORY SURGERY CENTER | Age: 21
End: 2021-11-11

## 2021-11-11 DIAGNOSIS — R10.9 FLANK PAIN: ICD-10-CM

## 2021-11-11 DIAGNOSIS — R10.9 FLANK PAIN: Primary | ICD-10-CM

## 2021-11-11 PROCEDURE — G1004 CDSM NDSC: HCPCS

## 2021-11-11 PROCEDURE — 74176 CT ABD & PELVIS W/O CONTRAST: CPT

## 2021-11-18 ENCOUNTER — TELEPHONE (OUTPATIENT)
Dept: UROLOGY | Facility: AMBULATORY SURGERY CENTER | Age: 21
End: 2021-11-18

## 2021-11-18 DIAGNOSIS — R30.0 DYSURIA: Primary | ICD-10-CM

## 2021-12-04 ENCOUNTER — HOSPITAL ENCOUNTER (EMERGENCY)
Facility: HOSPITAL | Age: 21
Discharge: HOME | End: 2021-12-04
Attending: EMERGENCY MEDICINE | Admitting: EMERGENCY MEDICINE
Payer: COMMERCIAL

## 2021-12-04 VITALS
SYSTOLIC BLOOD PRESSURE: 130 MMHG | RESPIRATION RATE: 18 BRPM | OXYGEN SATURATION: 99 % | HEART RATE: 61 BPM | DIASTOLIC BLOOD PRESSURE: 65 MMHG

## 2021-12-04 DIAGNOSIS — R10.9 RIGHT FLANK PAIN: Primary | ICD-10-CM

## 2021-12-04 DIAGNOSIS — M54.9 MUSCULOSKELETAL BACK PAIN: ICD-10-CM

## 2021-12-04 LAB
BACTERIA UR QL AUTO: ABNORMAL /HPF
BILIRUB UR QL STRIP: NEGATIVE
CLARITY UR: CLEAR
COLOR UR: YELLOW
EXT PREG TEST URINE: NEGATIVE
EXT. CONTROL ED NAV: NORMAL
GLUCOSE UR STRIP-MCNC: NEGATIVE MG/DL
HGB UR QL STRIP.AUTO: NEGATIVE
HYALINE CASTS #/AREA URNS LPF: ABNORMAL /LPF
KETONES UR STRIP-MCNC: NEGATIVE MG/DL
LEUKOCYTE ESTERASE UR QL STRIP: ABNORMAL
NITRITE UR QL STRIP: NEGATIVE
NON-SQ EPI CELLS URNS QL MICRO: ABNORMAL /HPF
PH UR STRIP.AUTO: 6.5 [PH] (ref 4.5–8)
PROT UR STRIP-MCNC: NEGATIVE MG/DL
RBC #/AREA URNS AUTO: ABNORMAL /HPF
SP GR UR STRIP.AUTO: 1.02 (ref 1–1.03)
UROBILINOGEN UR QL STRIP.AUTO: 0.2 E.U./DL
WBC #/AREA URNS AUTO: ABNORMAL /HPF

## 2021-12-04 PROCEDURE — 87086 URINE CULTURE/COLONY COUNT: CPT

## 2021-12-04 PROCEDURE — 81025 URINE PREGNANCY TEST: CPT

## 2021-12-04 PROCEDURE — 99284 EMERGENCY DEPT VISIT MOD MDM: CPT

## 2021-12-04 PROCEDURE — 99284 EMERGENCY DEPT VISIT MOD MDM: CPT | Performed by: EMERGENCY MEDICINE

## 2021-12-04 PROCEDURE — 81001 URINALYSIS AUTO W/SCOPE: CPT

## 2021-12-04 RX ORDER — LIDOCAINE 50 MG/G
1 PATCH TOPICAL ONCE
Status: DISCONTINUED | OUTPATIENT
Start: 2021-12-04 | End: 2021-12-05 | Stop reason: HOSPADM

## 2021-12-04 RX ADMIN — LIDOCAINE 5% 1 PATCH: 700 PATCH TOPICAL at 22:04

## 2021-12-06 LAB — BACTERIA UR CULT: NORMAL

## 2021-12-10 ENCOUNTER — HOSPITAL ENCOUNTER (EMERGENCY)
Facility: HOSPITAL | Age: 21
Discharge: HOME | End: 2021-12-10
Attending: EMERGENCY MEDICINE
Payer: COMMERCIAL

## 2021-12-10 VITALS
HEART RATE: 80 BPM | RESPIRATION RATE: 20 BRPM | DIASTOLIC BLOOD PRESSURE: 97 MMHG | SYSTOLIC BLOOD PRESSURE: 147 MMHG | OXYGEN SATURATION: 97 % | TEMPERATURE: 98.5 F

## 2021-12-10 DIAGNOSIS — Z20.822 CLOSE EXPOSURE TO COVID-19 VIRUS: ICD-10-CM

## 2021-12-10 DIAGNOSIS — Z20.822 ENCOUNTER FOR LABORATORY TESTING FOR COVID-19 VIRUS: Primary | ICD-10-CM

## 2021-12-10 PROCEDURE — U0005 INFEC AGEN DETEC AMPLI PROBE: HCPCS | Performed by: EMERGENCY MEDICINE

## 2021-12-10 PROCEDURE — 99282 EMERGENCY DEPT VISIT SF MDM: CPT | Performed by: EMERGENCY MEDICINE

## 2021-12-10 PROCEDURE — U0003 INFECTIOUS AGENT DETECTION BY NUCLEIC ACID (DNA OR RNA); SEVERE ACUTE RESPIRATORY SYNDROME CORONAVIRUS 2 (SARS-COV-2) (CORONAVIRUS DISEASE [COVID-19]), AMPLIFIED PROBE TECHNIQUE, MAKING USE OF HIGH THROUGHPUT TECHNOLOGIES AS DESCRIBED BY CMS-2020-01-R: HCPCS | Performed by: EMERGENCY MEDICINE

## 2021-12-10 PROCEDURE — 0241U HB NFCT DS VIR RESP RNA 4 TRGT: CPT | Performed by: EMERGENCY MEDICINE

## 2021-12-10 PROCEDURE — 99283 EMERGENCY DEPT VISIT LOW MDM: CPT

## 2021-12-11 LAB
FLUAV RNA RESP QL NAA+PROBE: NEGATIVE
FLUBV RNA RESP QL NAA+PROBE: NEGATIVE
RSV RNA RESP QL NAA+PROBE: NEGATIVE
SARS-COV-2 RNA RESP QL NAA+PROBE: NEGATIVE
SARS-COV-2 RNA RESP QL NAA+PROBE: NEGATIVE

## 2021-12-27 ENCOUNTER — TELEPHONE (OUTPATIENT)
Dept: OBGYN CLINIC | Facility: CLINIC | Age: 21
End: 2021-12-27

## 2021-12-31 ENCOUNTER — TELEPHONE (OUTPATIENT)
Dept: OTHER | Facility: OTHER | Age: 21
End: 2021-12-31

## 2022-01-03 ENCOUNTER — TELEPHONE (OUTPATIENT)
Dept: OBGYN CLINIC | Facility: CLINIC | Age: 22
End: 2022-01-03

## 2022-01-03 NOTE — TELEPHONE ENCOUNTER
Spoke with patient  Advised no issues with flying at this time  Advised to make sure she moves legs and empties bladder frequently

## 2022-01-24 ENCOUNTER — OFFICE VISIT (OUTPATIENT)
Dept: OBGYN CLINIC | Facility: CLINIC | Age: 22
End: 2022-01-24
Payer: COMMERCIAL

## 2022-01-24 VITALS
WEIGHT: 155 LBS | BODY MASS INDEX: 27.46 KG/M2 | HEIGHT: 63 IN | SYSTOLIC BLOOD PRESSURE: 114 MMHG | DIASTOLIC BLOOD PRESSURE: 70 MMHG

## 2022-01-24 DIAGNOSIS — N91.2 AMENORRHEA: Primary | ICD-10-CM

## 2022-01-24 LAB — SL AMB POCT URINE HCG: POSITIVE

## 2022-01-24 PROCEDURE — 81025 URINE PREGNANCY TEST: CPT | Performed by: PHYSICIAN ASSISTANT

## 2022-01-24 PROCEDURE — 76801 OB US < 14 WKS SINGLE FETUS: CPT | Performed by: PHYSICIAN ASSISTANT

## 2022-01-24 PROCEDURE — 99203 OFFICE O/P NEW LOW 30 MIN: CPT | Performed by: PHYSICIAN ASSISTANT

## 2022-01-24 NOTE — PROGRESS NOTES
Assessment/Plan:  - Viable IUP @ 9w1d today  - GENIA 22  - Continue PNV  - Call for concerns  - RTO 2 weeks for OB intake     Diagnoses and all orders for this visit:    Amenorrhea  -     POCT urine HCG  -     AMB US OB < 14 weeks single or first gestation level 1    Other orders  -     Prenatal MV-Min-Fe Fum-FA-DHA (PRENATAL 1 PO); Take by mouth          Subjective:      Patient ID: Ary Pires is a 24 y o  female  Daluis a Parent is a 22YO  WF presenting to the office for pregnancy confirmation via [address]  She is feeling well today  She is having some nausea and fatigue, but denies vomiting  Patient reports her LMP as 21, placing her at 9w1d with an GENIA of 22  The following portions of the patient's history were reviewed and updated as appropriate: allergies, current medications, past family history, past medical history, past social history, past surgical history and problem list     Review of Systems   Constitutional: Positive for fatigue  Negative for chills, fever and unexpected weight change  Respiratory: Negative for shortness of breath  Cardiovascular: Negative for chest pain  Gastrointestinal: Positive for nausea  Negative for abdominal pain, diarrhea and vomiting  Skin: Negative for rash  Objective:      /70 (BP Location: Left arm, Patient Position: Sitting, Cuff Size: Standard)   Ht 5' 3" (1 6 m)   Wt 70 3 kg (155 lb)   LMP 2021 (Exact Date)   BMI 27 46 kg/m²          Physical Exam  Vitals reviewed  Constitutional:       Appearance: Normal appearance  She is normal weight  HENT:      Head: Normocephalic and atraumatic  Pulmonary:      Effort: Pulmonary effort is normal    Genitourinary:     General: Normal vulva  Labia:         Right: No rash or lesion  Left: No rash or lesion  Comments: TVUS reveals IUP, yolk sac, fetal pole, +CM  CRL 2 39cm (9w1d)  FHR 168bpm  GENIA 22  Skin:     General: Skin is warm and dry  Neurological:      General: No focal deficit present  Mental Status: She is alert  Psychiatric:         Mood and Affect: Mood normal          Behavior: Behavior normal            Ultrasound Probe Disinfection    A transvaginal ultrasound was performed     Prior to use, disinfection was performed with High Level Disinfection Process (Trophon)  Probe serial number [id number]: [id number] was used    Maritza Gomez PA-C  01/24/22  11:42 AM

## 2022-02-01 ENCOUNTER — INITIAL PRENATAL (OUTPATIENT)
Dept: OBGYN CLINIC | Facility: CLINIC | Age: 22
End: 2022-02-01

## 2022-02-01 VITALS
SYSTOLIC BLOOD PRESSURE: 112 MMHG | BODY MASS INDEX: 27.39 KG/M2 | DIASTOLIC BLOOD PRESSURE: 70 MMHG | HEIGHT: 63 IN | WEIGHT: 154.6 LBS

## 2022-02-01 DIAGNOSIS — Z34.91 FIRST TRIMESTER PREGNANCY: ICD-10-CM

## 2022-02-01 DIAGNOSIS — Z3A.10 10 WEEKS GESTATION OF PREGNANCY: Primary | ICD-10-CM

## 2022-02-01 PROCEDURE — OBC

## 2022-02-01 RX ORDER — ACETAMINOPHEN 325 MG/1
650 TABLET ORAL EVERY 6 HOURS PRN
COMMUNITY

## 2022-02-01 NOTE — PROGRESS NOTES
OB INTAKE INTERVIEW  Pt presents for OB intake  Plan:  - Prenatal labs ordered   -FOB has sickle cell trait  Hemoglobin electrophoresis ordered  - Referral given for MFM  - Reviewed Genetic testing options  - Patient to call for concerns  - RTO 4 weeks for OB F/U visit and PAP/Cultures       OB History    Para Term  AB Living   1 0 0 0 0 0   SAB IAB Ectopic Multiple Live Births   0 0 0 0 0      # Outcome Date GA Lbr Alfredo/2nd Weight Sex Delivery Anes PTL Lv   1 Current               Obstetric Comments   Menarche 11     Hx of  delivery prior to 36 weeks 6 days: No  Last Menstrual Period:    Patient's last menstrual period was 2021 (exact date)  Ultrasound date: 22 9 weeks 1 days     Estimated Date of Delivery:  22    Current Issues:  Constipation :   No  Headaches :   No  Cramping:  No  Spotting :   No      Interview education   [facility] Pregnancy Essentials reviewed and discussed    Baby and Me [address] Handout   [facility] MFM Handout   Discussed genetic testing   Prenatal lab work: Scripts printed and given to pt   Influenza vaccine given today: No   Discussed Tdap vaccine     Immunizations:   Immunization History   Administered Date(s) Administered    DTaP 5 1999, 2000, 2000, 2001, 2005    H1N1, All Formulations 10/31/2009    HPV9 10/27/2015, 2015, 2016    Hep A, ped/adol, 2 dose 10/27/2015, 2018    Hep B, adult 2000, 2000, 2001    Hepatitis A 10/27/2015, 2018    Hib (PRP-OMP) 1999, 2000, 10/07/2000, 2001    INFLUENZA 10/31/2009, 2010    IPV 1999, 2000, 2001    Influenza Quadrivalent Preservative Free 3 years and older IM 10/27/2015    MMR 01/10/2001, 2005    Meningococcal, Unknown Serogroups 10/01/2011, 10/27/2015    Tdap 10/01/2011    Varicella 01/10/2001, 2009     Diabetes              Pregestational DM: No              hx of GDM: No              BMI >35: No              first degree relative with type 2 diabetes: No              hx of PCOS: No              current metformin use: No              prior hx of LGA/macrosomia: No                Hypertension              Hx of chronic HTN: No              hx of gestational HTN: No              hx of preeclampsia, eclampsia, or HELLP syndrome: No              Age 28 or older: No              Multifetal gestation: No  Type 1 or Type 2 DM: No  Renal Disease: No  Autoimmune disease (systemic lupus erythematosus, antiphospholipid antibody syndrome): No  Nulliparity: Yes  Obesity (BMI over 30): No  More than 10 year pregnancy interval: No  Previous IUGR, low birthweight or small for gestational age: No        Immunizations:              influenza vaccine: Discused recommendation               discussed Tdap vaccine administration at 27-28 weeks   Covid Vaccination: Declined, discussed recommendation     Dental visit with last 6 months - No, discussed recommendation   PHQ-2/9 score: 0  MyChart activated (not 1518 years of age)?: Yes      The patient was oriented to our practice and all questions were answered    Interviewed by: Joesph Swan RN 02/01/22

## 2022-02-04 ENCOUNTER — LAB (OUTPATIENT)
Dept: LAB | Facility: HOSPITAL | Age: 22
End: 2022-02-04
Attending: OBSTETRICS & GYNECOLOGY
Payer: COMMERCIAL

## 2022-02-04 DIAGNOSIS — Z34.91 FIRST TRIMESTER PREGNANCY: ICD-10-CM

## 2022-02-04 DIAGNOSIS — Z3A.10 10 WEEKS GESTATION OF PREGNANCY: ICD-10-CM

## 2022-02-04 LAB
ABO GROUP BLD: NORMAL
BASOPHILS # BLD AUTO: 0.05 THOUSANDS/ΜL (ref 0–0.1)
BASOPHILS NFR BLD AUTO: 1 % (ref 0–1)
BLD GP AB SCN SERPL QL: NEGATIVE
EOSINOPHIL # BLD AUTO: 0.12 THOUSAND/ΜL (ref 0–0.61)
EOSINOPHIL NFR BLD AUTO: 2 % (ref 0–6)
ERYTHROCYTE [DISTWIDTH] IN BLOOD BY AUTOMATED COUNT: 11.6 % (ref 11.6–15.1)
HBV SURFACE AG SER QL: NORMAL
HCT VFR BLD AUTO: 37.2 % (ref 34.8–46.1)
HCV AB SER QL: NORMAL
HGB BLD-MCNC: 12.6 G/DL (ref 11.5–15.4)
IMM GRANULOCYTES # BLD AUTO: 0.02 THOUSAND/UL (ref 0–0.2)
IMM GRANULOCYTES NFR BLD AUTO: 0 % (ref 0–2)
LYMPHOCYTES # BLD AUTO: 2.48 THOUSANDS/ΜL (ref 0.6–4.47)
LYMPHOCYTES NFR BLD AUTO: 35 % (ref 14–44)
MCH RBC QN AUTO: 31.4 PG (ref 26.8–34.3)
MCHC RBC AUTO-ENTMCNC: 33.9 G/DL (ref 31.4–37.4)
MCV RBC AUTO: 93 FL (ref 82–98)
MONOCYTES # BLD AUTO: 0.47 THOUSAND/ΜL (ref 0.17–1.22)
MONOCYTES NFR BLD AUTO: 7 % (ref 4–12)
NEUTROPHILS # BLD AUTO: 3.97 THOUSANDS/ΜL (ref 1.85–7.62)
NEUTS SEG NFR BLD AUTO: 55 % (ref 43–75)
NRBC BLD AUTO-RTO: 0 /100 WBCS
PLATELET # BLD AUTO: 233 THOUSANDS/UL (ref 149–390)
PMV BLD AUTO: 9.2 FL (ref 8.9–12.7)
RBC # BLD AUTO: 4.01 MILLION/UL (ref 3.81–5.12)
RH BLD: POSITIVE
RUBV IGG SERPL IA-ACNC: 82.8 IU/ML
SPECIMEN EXPIRATION DATE: NORMAL
WBC # BLD AUTO: 7.11 THOUSAND/UL (ref 4.31–10.16)

## 2022-02-04 PROCEDURE — 83020 HEMOGLOBIN ELECTROPHORESIS: CPT

## 2022-02-04 PROCEDURE — 36415 COLL VENOUS BLD VENIPUNCTURE: CPT

## 2022-02-04 PROCEDURE — 87086 URINE CULTURE/COLONY COUNT: CPT

## 2022-02-04 PROCEDURE — 86787 VARICELLA-ZOSTER ANTIBODY: CPT

## 2022-02-04 PROCEDURE — 80081 OBSTETRIC PANEL INC HIV TSTG: CPT

## 2022-02-04 PROCEDURE — 86803 HEPATITIS C AB TEST: CPT

## 2022-02-05 LAB — BACTERIA UR CULT: NORMAL

## 2022-02-06 LAB
HIV 1+2 AB+HIV1 P24 AG SERPL QL IA: NORMAL
RPR SER QL: NORMAL
VZV IGG SER IA-ACNC: NORMAL

## 2022-02-08 LAB
HGB A MFR BLD: 3 % (ref 1.8–3.2)
HGB A MFR BLD: 97 % (ref 96.4–98.8)
HGB F MFR BLD: 0 % (ref 0–2)
HGB FRACT BLD-IMP: NORMAL
HGB S MFR BLD: 0 %

## 2022-02-18 ENCOUNTER — ROUTINE PRENATAL (OUTPATIENT)
Dept: PERINATAL CARE | Facility: OTHER | Age: 22
End: 2022-02-18
Payer: COMMERCIAL

## 2022-02-18 VITALS
HEIGHT: 63 IN | SYSTOLIC BLOOD PRESSURE: 121 MMHG | DIASTOLIC BLOOD PRESSURE: 69 MMHG | BODY MASS INDEX: 27.75 KG/M2 | HEART RATE: 66 BPM | WEIGHT: 156.6 LBS

## 2022-02-18 DIAGNOSIS — Z3A.12 12 WEEKS GESTATION OF PREGNANCY: ICD-10-CM

## 2022-02-18 DIAGNOSIS — Z36.82 ENCOUNTER FOR NUCHAL TRANSLUCENCY TESTING: ICD-10-CM

## 2022-02-18 DIAGNOSIS — Z34.91 FIRST TRIMESTER PREGNANCY: ICD-10-CM

## 2022-02-18 DIAGNOSIS — O36.80X0 ENCOUNTER TO DETERMINE FETAL VIABILITY OF PREGNANCY, SINGLE OR UNSPECIFIED FETUS: Primary | ICD-10-CM

## 2022-02-18 PROCEDURE — 76813 OB US NUCHAL MEAS 1 GEST: CPT | Performed by: OBSTETRICS & GYNECOLOGY

## 2022-02-18 PROCEDURE — 76801 OB US < 14 WKS SINGLE FETUS: CPT | Performed by: OBSTETRICS & GYNECOLOGY

## 2022-02-18 PROCEDURE — 99241 PR OFFICE CONSULTATION NEW/ESTAB PATIENT 15 MIN: CPT | Performed by: OBSTETRICS & GYNECOLOGY

## 2022-02-18 NOTE — PROGRESS NOTES
CONSULT NOTE    Sophie Walden,   [address] [facility] [address] [facility]  [address]  [place],  [address] [facility]     Thank you for referring your Kita Dayron for a Maternal-Fetal Medicine Consultation:  Below is my consultation  Thank you very much for requesting consultation on this very nice patient for the indication of genetic screening  This is the patient's 1st pregnancy  The patient has no significant medical or surgical history  She denies the current use of tobacco, alcohol, or drugs  Her family medical history is unremarkable  The father of the baby is known to be a carrier for sickle cell given that his mother has sickle cell disease  The patient had a hemoglobin electrophoresis which indicates that she does not have any significant S hemoglobin; therefore, there should be no significant reproductive risk  The patient is aware that the baby has a 50% chance of being a carrier  We discussed the options for genetic screening, including but not limited to first trimester screening, second trimester screening, combined first and second trimester screening, noninvasive prenatal screening (NIPS) for patients at high risk and diagnostic screening through the use of CVS and amniocentesis  We discussed the risks and benefits of each approach including the sensitivities and false positive rates as well as the difference between a screening test and a diagnostic test   At the conclusion of our discussion the patient elected noninvasive prenatal screening utilizing the MaterniT 21 plus test   The patient was given a requisition and collection kit to have this performed at the lab  The results should be available in approximately 7-10 days  We discussed follow-up in detail and I recommend an anatomy ultrasound be scheduled for 20 weeks gestation  Thank you very much for allowing us to participate in the care of this very nice patient    Should you have any questions, please do not hesitate to contact our office  Please note, in addition to the time spent discussing the results of the ultrasound, I spent approximately 15 minutes of face-to-face time with the patient, greater than 50% of which was spent in counseling and the coordination of care for this patient  Portions of the record may have been created with voice recognition software  Occasional wrong word or "sound a like" substitutions may have occurred due to the inherent limitations of voice recognition software  Read the chart carefully and recognize, using context, where substitutions have occurred  Martín Radford MD  Attending Physician, [place]

## 2022-02-18 NOTE — PROGRESS NOTES
[employer] employee (or [employer] employee dependent) chose [id number] screen  Patient  provided with [employer] brochure, test kit, [employer] and test requisition form  Patient  instructed [id number] lab must be drawn at an outpatient [facility] laboratory  Patient informed blood specimen is sent via [address] [place] [address] and screen processed/completed by in- network lab- [facility]  Patient instructed to check with insurance provider before having lab drawn to check her OOP copay/deductible, she must meet lab deductible  If patient receives bill for more than $250 00 advised to notify M office  Patient verbalized understanding of all instructions and has office # to call back any questions

## 2022-02-22 ENCOUNTER — TELEPHONE (OUTPATIENT)
Dept: PERINATAL CARE | Facility: CLINIC | Age: 22
End: 2022-02-22

## 2022-02-22 NOTE — TELEPHONE ENCOUNTER
Location changed to [facility]  Left patient a message that her MFM appointment had to be rescheduled  The new time, date and location were provided  The patient has been instructed to please call us back at [phone] with any questions or concerns

## 2022-02-28 ENCOUNTER — ROUTINE PRENATAL (OUTPATIENT)
Dept: OBGYN CLINIC | Facility: CLINIC | Age: 22
End: 2022-02-28

## 2022-02-28 VITALS — SYSTOLIC BLOOD PRESSURE: 114 MMHG | WEIGHT: 159.6 LBS | DIASTOLIC BLOOD PRESSURE: 72 MMHG | BODY MASS INDEX: 28.27 KG/M2

## 2022-02-28 DIAGNOSIS — Z3A.14 14 WEEKS GESTATION OF PREGNANCY: ICD-10-CM

## 2022-02-28 DIAGNOSIS — Z11.3 SCREENING FOR STD (SEXUALLY TRANSMITTED DISEASE): Primary | ICD-10-CM

## 2022-02-28 PROCEDURE — PNV: Performed by: OBSTETRICS & GYNECOLOGY

## 2022-02-28 PROCEDURE — 87491 CHLMYD TRACH DNA AMP PROBE: CPT | Performed by: OBSTETRICS & GYNECOLOGY

## 2022-02-28 PROCEDURE — 87591 N.GONORRHOEAE DNA AMP PROB: CPT | Performed by: OBSTETRICS & GYNECOLOGY

## 2022-02-28 NOTE — PATIENT INSTRUCTIONS
David Quintanilla,     Part of routine screening in pregnancy is a maternal AFP level, which is a blood test that should be collected between 16-18 weeks of pregnancy, but can be collected up to 21weeks 6days  We have placed an order for this lab in your chart  If you can use the [facility]'s lab, you can report to the lab and they will be able to access the order  If you need to use an outside lab, please contact the office for a copy of the lab slip  Alpha-fetoprotein (AFP) is a protein produced in the liver of a developing fetus  During a baby's development, some AFP passes through the placenta and into the mother's blood  An AFP test measures the level of AFP in pregnant women during the second trimester of pregnancy  Abnormal levels of AFP in a mother's blood may be sign of a neural tube defect, a condition that causes abnormal development of a developing baby's brain and/or spine  Please contact the office at [phone] with any questions

## 2022-02-28 NOTE — PROGRESS NOTES
24 y o  Joechiquitajudith Awe female at 14w1d (Estimated Date of Delivery: 22) for PNV  Pre- Vitals      Most Recent Value   Prenatal Assessment    Fetal Heart Rate 145   Movement Absent   Prenatal Vitals    Blood Pressure 114/72   Weight - Scale 72 4 kg (159 lb 9 6 oz)   Urine Albumin/Glucose    Dilation/Effacement/Station    Vaginal Drainage    Edema    LLE Edema None   RLE Edema None   Facial Edema None         kg (14 lb 9 6 oz)    Patient presents for prenatal H&P  Feeling well and has minimal complaints  Denies vaginal bleeding and abdominal cramping  Tolerating oral intake  OB hx reviewed    MFM US performed 22  NIPT elected for genetic screening  AFP ordered for patient today   Last pap  ASCUS, HPV neg  Pap due   GC/Chlamydia collected  Reviewed vaccinations recommended in pregnancy, namely influenza, TDAP, and COVID vaccines  Patient oriented to practice  Reviewed 5 female providers and 2 advanced practitioners all of whom participate in prenatal care  Reviewed recommendations for weight gain in pregnancy  Recommended to limit total weight gain in pregnancy to 25-35lbs  Encouraged moderate intensity exercise throughout pregnancy  Follow up in 4 weeks

## 2022-03-01 LAB
C TRACH DNA SPEC QL NAA+PROBE: NEGATIVE
N GONORRHOEA DNA SPEC QL NAA+PROBE: NEGATIVE

## 2022-03-07 ENCOUNTER — OFFICE VISIT (OUTPATIENT)
Dept: INTERNAL MEDICINE CLINIC | Facility: CLINIC | Age: 22
End: 2022-03-07
Payer: COMMERCIAL

## 2022-03-07 VITALS
BODY MASS INDEX: 24.31 KG/M2 | HEART RATE: 85 BPM | SYSTOLIC BLOOD PRESSURE: 100 MMHG | WEIGHT: 160.4 LBS | DIASTOLIC BLOOD PRESSURE: 60 MMHG | OXYGEN SATURATION: 95 % | HEIGHT: 68 IN | TEMPERATURE: 96 F

## 2022-03-07 DIAGNOSIS — Z3A.14 14 WEEKS GESTATION OF PREGNANCY: Primary | ICD-10-CM

## 2022-03-07 DIAGNOSIS — R10.9 FLANK PAIN: ICD-10-CM

## 2022-03-07 PROCEDURE — 99203 OFFICE O/P NEW LOW 30 MIN: CPT | Performed by: NURSE PRACTITIONER

## 2022-03-07 NOTE — ASSESSMENT & PLAN NOTE
Has resolved  Patient form filled out and given to patient  She may return to work with no restriction

## 2022-03-07 NOTE — PROGRESS NOTES
Assessment/Plan:    Flank pain  Has resolved  Patient form filled out and given to patient  She may return to work with no restriction  Diagnoses and all orders for this visit:    14 weeks gestation of pregnancy    Flank pain          Subjective:      Patient ID: Tala Mistry is a 24 y o  female  Patient presents today as a new patient to our practice  She reports that she needs a form filled out stating that she can return to work  She is currently pregnant  She was seen in the ER on 12/4/22 details of ER visit below as per Dr Javier Green:  "Diagnosis management comments: 23 y/o F with PMH nephrolithiasis presenting with acute R flank pain  Pt had recent CT scan less than a month ago that did not show additional stones, unlikely to have new stones form so quickly  Will check u preg and UA, though most likely this is musculoskeletal  UA unimpressive, though d/w patient that if culture grew back positive we would call her and start antibiotics  In the meantime, she can take tylenol and ibuprofen as well as use lidocaine patches for MSK pain relief  Instructed to avoid heavy lifting for 2 weeks to prevent worsening pain  Pt understanding/agreeable  On chart review, culture was negative  "    She was out of work from 12/4-12/17  She returned to work with no discomfort and has been working since then with no trouble  We did discuss precautions to take since she is currently pregnant  She reports that he current job has been working with her to keep her and baby safe  The following portions of the patient's history were reviewed and updated as appropriate: allergies, current medications, past family history, past medical history, past social history, past surgical history and problem list     Review of Systems   Constitutional: Negative for activity change, appetite change, chills, diaphoresis and fever     HENT: Negative for congestion, ear discharge, ear pain, postnasal drip, rhinorrhea, sinus pressure, sinus pain and sore throat  Eyes: Negative for pain, discharge, itching and visual disturbance  Respiratory: Negative for cough, chest tightness, shortness of breath and wheezing  Cardiovascular: Negative for chest pain, palpitations and leg swelling  Gastrointestinal: Negative for abdominal pain, constipation, diarrhea, nausea and vomiting  Endocrine: Negative for polydipsia, polyphagia and polyuria  Genitourinary: Negative for difficulty urinating, dysuria and urgency  Musculoskeletal: Negative for arthralgias, back pain and neck pain  Skin: Negative for rash and wound  Neurological: Negative for dizziness, weakness, numbness and headaches           Past Medical History:   Diagnosis Date    Allergic rhinitis     Eczema     Kidney stone     Varicella     immunized         Current Outpatient Medications:     acetaminophen (TYLENOL) 325 mg tablet, Take 650 mg by mouth every 6 (six) hours as needed for mild pain, Disp: , Rfl:     Prenatal MV-Min-Fe Fum-FA-DHA (PRENATAL 1 PO), Take by mouth, Disp: , Rfl:     No Known Allergies    Social History   Past Surgical History:   Procedure Laterality Date    WISDOM TOOTH EXTRACTION       Family History   Problem Relation Age of Onset    No Known Problems Mother     No Known Problems Father     No Known Problems Brother     No Known Problems Brother     Mental illness Neg Hx     Substance Abuse Neg Hx        Objective:  /60 (BP Location: Left arm, Patient Position: Sitting, Cuff Size: Large)   Pulse 85   Temp (!) 96 °F (35 6 °C) (Tympanic)   Ht 5' 7 95" (1 726 m)   Wt 72 8 kg (160 lb 6 4 oz)   LMP 11/21/2021 (Exact Date)   SpO2 95% Comment: room air  BMI 24 42 kg/m²     Recent Results (from the past 1344 hour(s))   POCT urine HCG    Collection Time: 01/24/22 11:23 AM   Result Value Ref Range    URINE HCG positive    CBC and differential    Collection Time: 02/04/22 12:38 PM   Result Value Ref Range    WBC 7  11 4 31 - 10 16 Thousand/uL    RBC 4 01 3 81 - 5 12 Million/uL    Hemoglobin 12 6 11 5 - 15 4 g/dL    Hematocrit 37 2 34 8 - 46 1 %    MCV 93 82 - 98 fL    MCH 31 4 26 8 - 34 3 pg    MCHC 33 9 31 4 - 37 4 g/dL    RDW 11 6 11 6 - 15 1 %    MPV 9 2 8 9 - 12 7 fL    Platelets 519 500 - 574 Thousands/uL    nRBC 0 /100 WBCs    Neutrophils Relative 55 43 - 75 %    Immat GRANS % 0 0 - 2 %    Lymphocytes Relative 35 14 - 44 %    Monocytes Relative 7 4 - 12 %    Eosinophils Relative 2 0 - 6 %    Basophils Relative 1 0 - 1 %    Neutrophils Absolute 3 97 1 85 - 7 62 Thousands/µL    Immature Grans Absolute 0 02 0 00 - 0 20 Thousand/uL    Lymphocytes Absolute 2 48 0 60 - 4 47 Thousands/µL    Monocytes Absolute 0 47 0 17 - 1 22 Thousand/µL    Eosinophils Absolute 0 12 0 00 - 0 61 Thousand/µL    Basophils Absolute 0 05 0 00 - 0 10 Thousands/µL   Hepatitis B surface antigen    Collection Time: 02/04/22 12:38 PM   Result Value Ref Range    Hepatitis B Surface Ag Non-reactive Non-reactive, NonReactive - Confirmed   Hepatitis C antibody    Collection Time: 02/04/22 12:38 PM   Result Value Ref Range    Hepatitis C Ab Non-reactive Non-reactive   HIV 1/2 ANTIGEN/ANTIBODY (4TH GENERATION) W REFLEX [facility]    Collection Time: 02/04/22 12:38 PM   Result Value Ref Range    HIV-1/HIV-2 Ab Non-Reactive Non-Reactive   RPR    Collection Time: 02/04/22 12:38 PM   Result Value Ref Range    RPR Non-Reactive Non-Reactive   Rubella antibody, IgG    Collection Time: 02/04/22 12:38 PM   Result Value Ref Range    Rubella IgG Quant 82 8 >9 9 IU/mL   Type and screen    Collection Time: 02/04/22 12:38 PM   Result Value Ref Range    ABO Grouping B     Rh Factor Positive     Antibody Screen Negative     Specimen Expiration Date 20220207    Urine culture    Collection Time: 02/04/22 12:38 PM    Specimen: Urine, Clean Catch   Result Value Ref Range    Urine Culture <10,000 cfu/ml     Varicella zoster antibody, IgG    Collection Time: 02/04/22 12:38 PM   Result Value Ref Range    Varicella IgG IMMUNE IMMUNE   Hgb Fractionation Cascade    Collection Time: 02/04/22 12:38 PM   Result Value Ref Range    Hgb F Quant 0 0 0 0 - 2 0 %    Hgb A 97 0 96 4 - 98 8 %    Hgb S Quant 0 0 0 0 %    Hgb A2 Quant 3 0 1 8 - 3 2 %    Hgb Interp  Comment    Chlamydia/GC amplified DNA by PCR    Collection Time: 02/28/22  1:38 PM    Specimen: Vaginal; Genital   Result Value Ref Range    N gonorrhoeae, DNA Probe Negative Negative    Chlamydia trachomatis, DNA Probe Negative Negative            Physical Exam  Constitutional:       General: She is not in acute distress  Appearance: She is well-developed  She is not diaphoretic  HENT:      Head: Normocephalic and atraumatic  Right Ear: External ear normal       Left Ear: External ear normal       Nose: Nose normal       Mouth/Throat:      Pharynx: No oropharyngeal exudate  Eyes:      General:         Right eye: No discharge  Left eye: No discharge  Conjunctiva/sclera: Conjunctivae normal       Pupils: Pupils are equal, round, and reactive to light  Neck:      Thyroid: No thyromegaly  Cardiovascular:      Rate and Rhythm: Normal rate and regular rhythm  Heart sounds: Normal heart sounds  No murmur heard  No friction rub  No gallop  Pulmonary:      Effort: Pulmonary effort is normal  No respiratory distress  Breath sounds: Normal breath sounds  No stridor  No wheezing or rales  Abdominal:      General: Bowel sounds are normal  There is no distension  Palpations: Abdomen is soft  Tenderness: There is no abdominal tenderness  Musculoskeletal:      Cervical back: Normal range of motion and neck supple  Lymphadenopathy:      Cervical: No cervical adenopathy  Skin:     General: Skin is warm and dry  Findings: No erythema or rash  Neurological:      Mental Status: She is alert and oriented to person, place, and time     Psychiatric:         Behavior: Behavior normal          Thought Content: Thought content normal          Judgment: Judgment normal

## 2022-03-28 ENCOUNTER — ROUTINE PRENATAL (OUTPATIENT)
Dept: OBGYN CLINIC | Facility: CLINIC | Age: 22
End: 2022-03-28

## 2022-03-28 ENCOUNTER — LAB (OUTPATIENT)
Dept: LAB | Facility: AMBULARY SURGERY CENTER | Age: 22
End: 2022-03-28
Attending: OBSTETRICS & GYNECOLOGY
Payer: COMMERCIAL

## 2022-03-28 VITALS — SYSTOLIC BLOOD PRESSURE: 118 MMHG | WEIGHT: 161.6 LBS | DIASTOLIC BLOOD PRESSURE: 60 MMHG | BODY MASS INDEX: 24.61 KG/M2

## 2022-03-28 DIAGNOSIS — Z3A.18 18 WEEKS GESTATION OF PREGNANCY: ICD-10-CM

## 2022-03-28 DIAGNOSIS — Z3A.14 14 WEEKS GESTATION OF PREGNANCY: ICD-10-CM

## 2022-03-28 DIAGNOSIS — Z34.00 SUPERVISION OF NORMAL FIRST PREGNANCY, ANTEPARTUM: Primary | ICD-10-CM

## 2022-03-28 PROCEDURE — 36415 COLL VENOUS BLD VENIPUNCTURE: CPT

## 2022-03-28 PROCEDURE — 82105 ALPHA-FETOPROTEIN SERUM: CPT

## 2022-03-28 PROCEDURE — PNV: Performed by: OBSTETRICS & GYNECOLOGY

## 2022-03-28 NOTE — PROGRESS NOTES
24 y o  Nataliia [place] female at 18w1d (Estimated Date of Delivery: 22) for PNV      Pre- Vitals      Most Recent Value   Prenatal Assessment    Movement Present   Prenatal Vitals    Blood Pressure 118/60   Weight - Scale 73 3 kg (161 lb 9 6 oz)   Urine Albumin/Glucose    Dilation/Effacement/Station    Vaginal Drainage    Edema    LLE Edema None   RLE Edema None   Facial Edema None        TW 53 kg (16 lb 9 6 oz)  Doing well, no concerns 2nd trimester  Scheduled for level II u/s

## 2022-03-30 LAB
2ND TRIMESTER 4 SCREEN SERPL-IMP: NORMAL
AFP ADJ MOM SERPL: 0.73
AFP INTERP AMN-IMP: NORMAL
AFP INTERP SERPL-IMP: NORMAL
AFP INTERP SERPL-IMP: NORMAL
AFP SERPL-MCNC: 32.7 NG/ML
AGE AT DELIVERY: 22.9 YR
GA METHOD: NORMAL
GA: 18.1 WEEKS
IDDM PATIENT QL: NO
MULTIPLE PREGNANCY: NO
NEURAL TUBE DEFECT RISK FETUS: NORMAL %

## 2022-04-12 ENCOUNTER — ROUTINE PRENATAL (OUTPATIENT)
Dept: PERINATAL CARE | Facility: OTHER | Age: 22
End: 2022-04-12
Payer: COMMERCIAL

## 2022-04-12 VITALS
HEIGHT: 64 IN | SYSTOLIC BLOOD PRESSURE: 123 MMHG | DIASTOLIC BLOOD PRESSURE: 77 MMHG | WEIGHT: 170.8 LBS | BODY MASS INDEX: 29.16 KG/M2 | HEART RATE: 66 BPM

## 2022-04-12 DIAGNOSIS — Z3A.20 20 WEEKS GESTATION OF PREGNANCY: ICD-10-CM

## 2022-04-12 DIAGNOSIS — Z36.86 ENCOUNTER FOR ANTENATAL SCREENING FOR CERVICAL LENGTH: ICD-10-CM

## 2022-04-12 DIAGNOSIS — Z36.3 ENCOUNTER FOR ANTENATAL SCREENING FOR MALFORMATIONS: Primary | ICD-10-CM

## 2022-04-12 PROCEDURE — 76805 OB US >/= 14 WKS SNGL FETUS: CPT | Performed by: OBSTETRICS & GYNECOLOGY

## 2022-04-12 PROCEDURE — 76817 TRANSVAGINAL US OBSTETRIC: CPT | Performed by: OBSTETRICS & GYNECOLOGY

## 2022-04-12 PROCEDURE — 99213 OFFICE O/P EST LOW 20 MIN: CPT | Performed by: OBSTETRICS & GYNECOLOGY

## 2022-04-12 NOTE — LETTER
April 12, 2022     Mikala Reyes MD  [address] [id number] [facility]    Patient: Cyndie Cheng   YOB: 2000   Date of Visit: 4/12/2022       Dear Dr Howard Lim:    Thank you for referring Cyndie Cheng to me for evaluation  Below are my notes for this consultation  If you have questions, please do not hesitate to call me  I look forward to following your patient along with you  Sincerely,        Divina Morillo MD        CC: No Recipients  Divina Morillo MD  4/12/2022  1:34 PM  Sign when Signing Visit  Please refer to the [facility] ultrasound report in Ob Procedures for additional information regarding today's visit

## 2022-04-12 NOTE — PROGRESS NOTES
Please refer to the [facility] ultrasound report in Ob Procedures for additional information regarding today's visit

## 2022-04-18 ENCOUNTER — ROUTINE PRENATAL (OUTPATIENT)
Dept: OBGYN CLINIC | Facility: CLINIC | Age: 22
End: 2022-04-18

## 2022-04-18 VITALS — DIASTOLIC BLOOD PRESSURE: 68 MMHG | SYSTOLIC BLOOD PRESSURE: 120 MMHG | BODY MASS INDEX: 28.97 KG/M2 | WEIGHT: 168.8 LBS

## 2022-04-18 DIAGNOSIS — Z3A.21 21 WEEKS GESTATION OF PREGNANCY: ICD-10-CM

## 2022-04-18 DIAGNOSIS — O26.02 EXCESSIVE WEIGHT GAIN DURING PREGNANCY IN SECOND TRIMESTER: Primary | ICD-10-CM

## 2022-04-18 PROCEDURE — PNV: Performed by: PHYSICIAN ASSISTANT

## 2022-04-18 NOTE — PROGRESS NOTES
Patient is a 25 YO [address] [facility] [place] female presenting to the office at 21 1 weeks for routine OB care  BP: 120/68  TWlb  Fetal Movement: yes good movement  Feeling well today  Denies LOF, CTX, VB  Reviewed precautions  Has f/u MFM US  Discussed weight gain  Recommend decreasing sugars, increase vegetables, lean meats  Patient to call for concerns  RTO 4 weeks

## 2022-05-06 ENCOUNTER — TELEPHONE (OUTPATIENT)
Dept: OBGYN CLINIC | Facility: CLINIC | Age: 22
End: 2022-05-06

## 2022-05-06 NOTE — TELEPHONE ENCOUNTER
Pt  C/o sore throat describes as burning, wondering what she can take  Declines fever or any other symptoms  Advised can take tylenol, pepcid (for burning pain if associated with reflux), salt water gargles, humidified air at night  Pt  Verbalized understanding

## 2022-05-17 ENCOUNTER — ROUTINE PRENATAL (OUTPATIENT)
Dept: OBGYN CLINIC | Facility: CLINIC | Age: 22
End: 2022-05-17

## 2022-05-17 VITALS — DIASTOLIC BLOOD PRESSURE: 62 MMHG | SYSTOLIC BLOOD PRESSURE: 104 MMHG | BODY MASS INDEX: 30.73 KG/M2 | WEIGHT: 179 LBS

## 2022-05-17 DIAGNOSIS — Z34.02 ENCOUNTER FOR SUPERVISION OF NORMAL FIRST PREGNANCY IN SECOND TRIMESTER: ICD-10-CM

## 2022-05-17 DIAGNOSIS — Z3A.26 26 WEEKS GESTATION OF PREGNANCY: Primary | ICD-10-CM

## 2022-05-17 PROCEDURE — PNV: Performed by: OBSTETRICS & GYNECOLOGY

## 2022-05-17 NOTE — PROGRESS NOTES
24 y o  Sarah Jensen female at 25w2d (Estimated Date of Delivery: 22) for PNV      Pre- Vitals    Flowsheet Row Most Recent Value   Prenatal Assessment    Movement Present   Prenatal Vitals    Blood Pressure 104/62   Weight - Scale 81 2 kg (179 lb)   Urine Albumin/Glucose    Dilation/Effacement/Station    Vaginal Drainage    Edema    LLE Edema None   RLE Edema None   Facial Edema None        TWG: 15 4 kg (34 lb)  Scheduled for a growth u/s  Given labs

## 2022-06-03 ENCOUNTER — OFFICE VISIT (OUTPATIENT)
Dept: URGENT CARE | Age: 22
End: 2022-06-03
Payer: COMMERCIAL

## 2022-06-03 VITALS
DIASTOLIC BLOOD PRESSURE: 66 MMHG | HEART RATE: 72 BPM | OXYGEN SATURATION: 98 % | RESPIRATION RATE: 16 BRPM | SYSTOLIC BLOOD PRESSURE: 133 MMHG | TEMPERATURE: 98 F

## 2022-06-03 DIAGNOSIS — R05.9 COUGH: Primary | ICD-10-CM

## 2022-06-03 LAB
SARS-COV-2 AG UPPER RESP QL IA: NEGATIVE
VALID CONTROL: NORMAL

## 2022-06-03 PROCEDURE — U0003 INFECTIOUS AGENT DETECTION BY NUCLEIC ACID (DNA OR RNA); SEVERE ACUTE RESPIRATORY SYNDROME CORONAVIRUS 2 (SARS-COV-2) (CORONAVIRUS DISEASE [COVID-19]), AMPLIFIED PROBE TECHNIQUE, MAKING USE OF HIGH THROUGHPUT TECHNOLOGIES AS DESCRIBED BY CMS-2020-01-R: HCPCS

## 2022-06-03 PROCEDURE — 99213 OFFICE O/P EST LOW 20 MIN: CPT

## 2022-06-03 PROCEDURE — U0005 INFEC AGEN DETEC AMPLI PROBE: HCPCS

## 2022-06-03 PROCEDURE — 87811 SARS-COV-2 COVID19 W/OPTIC: CPT

## 2022-06-03 NOTE — PROGRESS NOTES
[address] [facility]        NAME: Scar Caicedo is a 24 y o  female  : 2000    MRN: [id number]  DATE: Michelle 3, 2022  TIME: 7:06 PM    Assessment and Plan   Cough [R05 9]  1  Cough  Poct Covid 19 Rapid Antigen Test    COVID Only -Office Collect   19-year-old female presents for evaluation of cough and nasal congestion since Wednesday  Rapid COVID negative, will send PCR for confirmation  Continue Tylenol and humidifier as needed  Follow-up with OBGYN  Patient Instructions   Report to emergency department if:  -Chest pain/SOB/wheezing  -Intractable fever > 100 4   -Unable to tolerate P O  fluids or manage saliva    Follow up with PCP in 3-5 days  Proceed to  ER if symptoms worsen  Chief Complaint     Chief Complaint   Patient presents with    Sore Throat     Cough, head congestion, stuffy nose, since Wed,         History of Present Illness       Patient is a 19-year-old female currently 6 months pregnant with past medical history significant for allergic rhinitis, eczema who presents for evaluation of sore throat, nasal congestion and cough which began this past Tuesday and Wednesday  She reports a dry cough and rhinorrhea  She has not taken anything for her symptoms  She is not COVID vaccinated  Denies chest pain, shortness a breath, fever, nausea/vomiting/diarrhea, body aches/chills  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  Review of Systems   Review of Systems   Constitutional: Negative for chills, fatigue and fever  HENT: Positive for congestion and rhinorrhea  Negative for ear pain, sinus pressure, sinus pain, sneezing and sore throat  Eyes: Negative for pain and visual disturbance  Respiratory: Positive for cough  Negative for apnea, choking, chest tightness, shortness of breath, wheezing and stridor  Cardiovascular: Negative for chest pain, palpitations and leg swelling     Gastrointestinal: Negative for abdominal pain, constipation, diarrhea, nausea and vomiting  Endocrine: Negative  Genitourinary: Negative for dysuria and hematuria  Musculoskeletal: Negative for arthralgias, back pain, myalgias, neck pain and neck stiffness  Skin: Negative for color change and rash  Allergic/Immunologic: Negative  Negative for environmental allergies  Neurological: Negative for dizziness, seizures, syncope, facial asymmetry, light-headedness, numbness and headaches  Hematological: Negative for adenopathy  Psychiatric/Behavioral: Negative  All other systems reviewed and are negative  Current Medications       Current Outpatient Medications:     acetaminophen (TYLENOL) 325 mg tablet, Take 650 mg by mouth every 6 (six) hours as needed for mild pain, Disp: , Rfl:     Prenatal MV-Min-Fe Fum-FA-DHA (PRENATAL 1 PO), Take by mouth, Disp: , Rfl:     Current Allergies     Allergies as of 06/03/2022    (No Known Allergies)            The following portions of the patient's history were reviewed and updated as appropriate: allergies, current medications, past family history, past medical history, past social history, past surgical history and problem list      Past Medical History:   Diagnosis Date    Allergic rhinitis     Eczema     Kidney stone     Varicella     immunized       Past Surgical History:   Procedure Laterality Date    WISDOM TOOTH EXTRACTION         Family History   Problem Relation Age of Onset    No Known Problems Mother     No Known Problems Father     No Known Problems Brother     No Known Problems Brother     Mental illness Neg Hx     Substance Abuse Neg Hx          Medications have been verified  Objective   /66   Pulse 72   Temp 98 °F (36 7 °C)   Resp 16   LMP 11/21/2021 (Exact Date)   SpO2 98%        Physical Exam     Physical Exam  Vitals and nursing note reviewed  Constitutional:       General: She is not in acute distress  Appearance: Normal appearance   She is not ill-appearing, toxic-appearing or diaphoretic  Interventions: She is not intubated  HENT:      Head: Normocephalic and atraumatic  Right Ear: Tympanic membrane and ear canal normal  No drainage, swelling or tenderness  No middle ear effusion  Tympanic membrane is not erythematous  Left Ear: Tympanic membrane and ear canal normal  No drainage, swelling or tenderness  No middle ear effusion  Tympanic membrane is not erythematous  Nose: Congestion and rhinorrhea present  Rhinorrhea is clear  Mouth/Throat:      Mouth: Mucous membranes are moist  No oral lesions  Tongue: No lesions  Tongue does not deviate from midline  Palate: No mass and lesions  Pharynx: Oropharynx is clear  Uvula midline  No pharyngeal swelling, oropharyngeal exudate, posterior oropharyngeal erythema or uvula swelling  Tonsils: No tonsillar exudate or tonsillar abscesses  2+ on the right  2+ on the left  Eyes:      Extraocular Movements: Extraocular movements intact  Conjunctiva/sclera: Conjunctivae normal       Pupils: Pupils are equal, round, and reactive to light  Cardiovascular:      Rate and Rhythm: Normal rate and regular rhythm  Pulses: Normal pulses  Heart sounds: Normal heart sounds, S1 normal and S2 normal  Heart sounds not distant  No murmur heard  No friction rub  No gallop  Pulmonary:      Effort: Pulmonary effort is normal  No tachypnea, bradypnea, accessory muscle usage, prolonged expiration, respiratory distress or retractions  She is not intubated  Breath sounds: Normal breath sounds  No stridor, decreased air movement or transmitted upper airway sounds  No decreased breath sounds, wheezing, rhonchi or rales  Abdominal:      General: Bowel sounds are normal       Palpations: Abdomen is soft  Tenderness: There is no abdominal tenderness  There is no guarding or rebound  Musculoskeletal:         General: Normal range of motion        Cervical back: Normal range of motion and neck supple  No tenderness  Lymphadenopathy:      Cervical: No cervical adenopathy  Skin:     General: Skin is warm and dry  Capillary Refill: Capillary refill takes less than 2 seconds  Neurological:      General: No focal deficit present  Mental Status: She is alert and oriented to person, place, and time  Cranial Nerves: No cranial nerve deficit     Psychiatric:         Mood and Affect: Mood normal          Behavior: Behavior normal

## 2022-06-03 NOTE — LETTER
Michelle 3, 2022     Patient: Karina Doty   YOB: 2000   Date of Visit: 6/3/2022       To Whom it May Concern:    Karina Doty was seen in my clinic on 6/3/2022  She  May return to work on 6/6/2022  If you have any questions or concerns, please don't hesitate to call           Sincerely,          PARAG Mccormick        CC: No Recipients

## 2022-06-04 LAB — SARS-COV-2 RNA RESP QL NAA+PROBE: NEGATIVE

## 2022-06-08 ENCOUNTER — APPOINTMENT (OUTPATIENT)
Dept: LAB | Facility: AMBULARY SURGERY CENTER | Age: 22
End: 2022-06-08
Attending: OBSTETRICS & GYNECOLOGY
Payer: COMMERCIAL

## 2022-06-08 DIAGNOSIS — Z3A.26 26 WEEKS GESTATION OF PREGNANCY: ICD-10-CM

## 2022-06-08 LAB
ERYTHROCYTE [DISTWIDTH] IN BLOOD BY AUTOMATED COUNT: 12 % (ref 11.6–15.1)
GLUCOSE 1H P 50 G GLC PO SERPL-MCNC: 108 MG/DL (ref 40–134)
HCT VFR BLD AUTO: 36.2 % (ref 34.8–46.1)
HGB BLD-MCNC: 12.4 G/DL (ref 11.5–15.4)
MCH RBC QN AUTO: 32.4 PG (ref 26.8–34.3)
MCHC RBC AUTO-ENTMCNC: 34.3 G/DL (ref 31.4–37.4)
MCV RBC AUTO: 95 FL (ref 82–98)
PLATELET # BLD AUTO: 229 THOUSANDS/UL (ref 149–390)
PMV BLD AUTO: 9.4 FL (ref 8.9–12.7)
RBC # BLD AUTO: 3.83 MILLION/UL (ref 3.81–5.12)
WBC # BLD AUTO: 9.03 THOUSAND/UL (ref 4.31–10.16)

## 2022-06-08 PROCEDURE — 85027 COMPLETE CBC AUTOMATED: CPT

## 2022-06-08 PROCEDURE — 86592 SYPHILIS TEST NON-TREP QUAL: CPT

## 2022-06-08 PROCEDURE — 82950 GLUCOSE TEST: CPT

## 2022-06-08 PROCEDURE — 36415 COLL VENOUS BLD VENIPUNCTURE: CPT

## 2022-06-09 ENCOUNTER — ROUTINE PRENATAL (OUTPATIENT)
Dept: OBGYN CLINIC | Facility: CLINIC | Age: 22
End: 2022-06-09
Payer: COMMERCIAL

## 2022-06-09 VITALS — SYSTOLIC BLOOD PRESSURE: 114 MMHG | DIASTOLIC BLOOD PRESSURE: 62 MMHG | WEIGHT: 187.2 LBS | BODY MASS INDEX: 32.13 KG/M2

## 2022-06-09 DIAGNOSIS — Z23 NEED FOR TDAP VACCINATION: ICD-10-CM

## 2022-06-09 DIAGNOSIS — Z3A.28 28 WEEKS GESTATION OF PREGNANCY: ICD-10-CM

## 2022-06-09 DIAGNOSIS — Z34.02 ENCOUNTER FOR SUPERVISION OF NORMAL FIRST PREGNANCY IN SECOND TRIMESTER: Primary | ICD-10-CM

## 2022-06-09 PROBLEM — Z11.3 SCREENING FOR STDS (SEXUALLY TRANSMITTED DISEASES): Status: RESOLVED | Noted: 2021-03-10 | Resolved: 2022-06-09

## 2022-06-09 PROBLEM — Z01.419 ENCOUNTER FOR GYNECOLOGICAL EXAMINATION (GENERAL) (ROUTINE) WITHOUT ABNORMAL FINDINGS: Status: RESOLVED | Noted: 2019-01-16 | Resolved: 2022-06-09

## 2022-06-09 PROBLEM — R10.9 FLANK PAIN: Status: RESOLVED | Noted: 2022-03-07 | Resolved: 2022-06-09

## 2022-06-09 PROBLEM — Z30.41 ENCOUNTER FOR SURVEILLANCE OF CONTRACEPTIVE PILLS: Status: RESOLVED | Noted: 2020-01-16 | Resolved: 2022-06-09

## 2022-06-09 PROBLEM — Z31.69 PRE-CONCEPTION COUNSELING: Status: RESOLVED | Noted: 2021-01-25 | Resolved: 2022-06-09

## 2022-06-09 LAB — RPR SER QL: NORMAL

## 2022-06-09 PROCEDURE — 90471 IMMUNIZATION ADMIN: CPT | Performed by: OBSTETRICS & GYNECOLOGY

## 2022-06-09 PROCEDURE — PNV: Performed by: OBSTETRICS & GYNECOLOGY

## 2022-06-09 PROCEDURE — 90715 TDAP VACCINE 7 YRS/> IM: CPT | Performed by: OBSTETRICS & GYNECOLOGY

## 2022-06-09 NOTE — LETTER
06/09/22      Socorro Ameerbeg  2000    To Whom It May Concern,      Your employee is a patient at [facility] at [address]  Her Estimated Date of Delivery: 8/28/22    We recommend that all pregnant women:    Have a well-ventilated work space  Wear low-heeled shoes  Work no more than 40 hours per week  Have a 15 minute break every 2 hours and at least 30 minutes for a meal break  Use good body mechanics by bending at the knees to avoid back strain and lift no more than 20 pounds without assistance  Have ready access to bathrooms and water  She may continue to work until her due date unless medical complications arise  We anticipate she may return to work in 6-8 weeks after delivery  Please contact our office with any questions           Laveda Paget, MD, DO Mino Flores MD

## 2022-06-09 NOTE — PROGRESS NOTES
Pt doing well today  Denies any vaginal bleeding/leakage  Urine dip neg/neg  Red folder/t-dap/breast pump given today

## 2022-06-09 NOTE — PROGRESS NOTES
This is a 24 y o  Jer Lemme at 28w4d who presents for return OB visit  No complaints  Denies contractions, leakage, bleeding  Endorses fetal movement  BP: 114/62 TWlb   28 wk labs reviewed  TDAP offered and accepted  Rhogam not indicated  [address] reviewed  Skin to skin, rooming in, delayed cord clamp,  pain management in labor discussed  Consent signed  Full code  OK with blood transfusion   Discussed weight gain  Pt reports drinking pepsi and juice, we discussed avoiding soft drinks and replacing with water     F/up 2 wks

## 2022-06-10 LAB
DME PARACHUTE DELIVERY DATE REQUESTED: NORMAL
DME PARACHUTE ITEM DESCRIPTION: NORMAL
DME PARACHUTE ORDER STATUS: NORMAL
DME PARACHUTE SUPPLIER NAME: NORMAL
DME PARACHUTE SUPPLIER PHONE: NORMAL

## 2022-06-22 ENCOUNTER — ROUTINE PRENATAL (OUTPATIENT)
Dept: OBGYN CLINIC | Facility: CLINIC | Age: 22
End: 2022-06-22

## 2022-06-22 VITALS — DIASTOLIC BLOOD PRESSURE: 62 MMHG | SYSTOLIC BLOOD PRESSURE: 110 MMHG | WEIGHT: 189 LBS | BODY MASS INDEX: 32.44 KG/M2

## 2022-06-22 DIAGNOSIS — Z34.03 ENCOUNTER FOR SUPERVISION OF NORMAL FIRST PREGNANCY IN THIRD TRIMESTER: Primary | ICD-10-CM

## 2022-06-22 DIAGNOSIS — Z3A.28 28 WEEKS GESTATION OF PREGNANCY: ICD-10-CM

## 2022-06-22 PROCEDURE — PNV: Performed by: OBSTETRICS & GYNECOLOGY

## 2022-06-22 NOTE — PROGRESS NOTES
Patient denies any pelvic pressure, contractions or fluid leaking  She has no concerns at this time

## 2022-06-22 NOTE — PROGRESS NOTES
24 y o  Jer Burnett female at [address] [place] [address] (Estimated Date of Delivery: 22) for PNV      Pre- Vitals    Flowsheet Row Most Recent Value   Prenatal Assessment    Movement Present   Prenatal Vitals    Blood Pressure 110/62   Weight - Scale 85 7 kg (189 lb)   Urine Albumin/Glucose    Dilation/Effacement/Station    Vaginal Drainage    Edema    LLE Edema None   RLE Edema None   Facial Edema None        TW kg (44 lb)  Doing well 3rd trimester  Scheduled for growth u/s

## 2022-07-06 ENCOUNTER — ULTRASOUND (OUTPATIENT)
Dept: PERINATAL CARE | Facility: OTHER | Age: 22
End: 2022-07-06
Payer: COMMERCIAL

## 2022-07-06 VITALS
SYSTOLIC BLOOD PRESSURE: 137 MMHG | WEIGHT: 195.6 LBS | BODY MASS INDEX: 33.39 KG/M2 | HEIGHT: 64 IN | HEART RATE: 85 BPM | DIASTOLIC BLOOD PRESSURE: 84 MMHG

## 2022-07-06 DIAGNOSIS — Z3A.32 32 WEEKS GESTATION OF PREGNANCY: Primary | ICD-10-CM

## 2022-07-06 DIAGNOSIS — Z36.4 ULTRASOUND FOR ANTENATAL SCREENING FOR FETAL GROWTH RESTRICTION: ICD-10-CM

## 2022-07-06 PROCEDURE — 76816 OB US FOLLOW-UP PER FETUS: CPT | Performed by: OBSTETRICS & GYNECOLOGY

## 2022-07-06 NOTE — PATIENT INSTRUCTIONS
Kick Counts in Pregnancy   WHAT YOU NEED TO KNOW:   Kick counts measure how much your baby is moving in your womb  A kick from your baby can be felt as a twist, turn, swish, roll, or jab  It is common to feel your baby kicking at 26 to 28 weeks of pregnancy  You may feel your baby kick as early as 20 weeks of pregnancy  You may want to start counting at 28 weeks  DISCHARGE INSTRUCTIONS:   Contact your doctor immediately if:   You feel a change in the number of kicks or movements of your baby  You feel fewer than 10 kicks within 2 hours  You have questions or concerns about your baby's movements  Why measure kick counts:  Your baby's movement may provide information about your baby's health  He or she may move less, or not at all, if there are problems  Your baby may move less if he or she is not getting enough oxygen or nutrition from the placenta  Do not smoke while you are pregnant  Smoking decreases the amount of oxygen that gets to your baby  Talk to your healthcare provider if you need help to quit smoking  Tell your healthcare provider as soon as you feel a change in your baby's movements  When to measure kick counts:   Measure kick counts at the same time every day  Measure kick counts when your baby is awake and most active  Your baby may be most active in the evening  How to measure kick counts:  Check that your baby is awake before you measure kick counts  You can wake up your baby by lightly pushing on your belly, walking, or drinking something cold  Your healthcare provider may tell you different ways to measure kick counts  You may be told to do the following:  Use a chart or clock to keep track of the time you start and finish counting  Sit in a chair or lie on your left side  Place your hands on the largest part of your belly  Count until you reach 10 kicks  Write down how much time it takes to count 10 kicks  It may take 30 minutes to 2 hours to count 10 kicks  It should not take more than 2 hours to count 10 kicks  Follow up with your doctor as directed:  Write down your questions so you remember to ask them during your visits  © Copyright [employer] 2022 Information is for End User's use only and may not be sold, redistributed or otherwise used for commercial purposes  All illustrations and images included in CareNotes® are the copyrighted property of A D A M , Inc  or [facility] Linh Lawson   The above information is an  only  It is not intended as medical advice for individual conditions or treatments  Talk to your doctor, nurse or pharmacist before following any medical regimen to see if it is safe and effective for you

## 2022-07-06 NOTE — LETTER
July 6, 2022     Sherlyn Lorenz MD  [address] [id number] [facility]    Patient: Chava Valencia   YOB: 2000   Date of Visit: 7/6/2022       Dear Dr Ria Almanza:    Thank you for referring Chava Valencia to me for evaluation  Below are my notes for this consultation  If you have questions, please do not hesitate to call me  I look forward to following your patient along with you  Sincerely,        Abdirashid Suarez MD        CC: No Recipients  Abdirashid Suarez MD  7/5/2022  8:13 AM  Sign when Signing Visit  Please refer to the [facility] ultrasound report in Ob Procedures for additional information regarding today's visit

## 2022-07-07 ENCOUNTER — ROUTINE PRENATAL (OUTPATIENT)
Dept: OBGYN CLINIC | Facility: CLINIC | Age: 22
End: 2022-07-07

## 2022-07-07 VITALS — DIASTOLIC BLOOD PRESSURE: 72 MMHG | WEIGHT: 194 LBS | BODY MASS INDEX: 33.3 KG/M2 | SYSTOLIC BLOOD PRESSURE: 118 MMHG

## 2022-07-07 DIAGNOSIS — Z34.03 ENCOUNTER FOR SUPERVISION OF NORMAL FIRST PREGNANCY IN THIRD TRIMESTER: Primary | ICD-10-CM

## 2022-07-07 DIAGNOSIS — Z3A.32 32 WEEKS GESTATION OF PREGNANCY: ICD-10-CM

## 2022-07-07 PROCEDURE — PNV: Performed by: OBSTETRICS & GYNECOLOGY

## 2022-07-07 NOTE — PROGRESS NOTES
This is a 24 y o  Kelly Civatte at [address] who presents for return OB visit  No complaints  Denies contractions, leakage, bleeding   Endorses fetal movement   BP: 118/72 TWlb    Recent growth US 4#6oz (42%), no further US indicated  Plans to breastfeed: yes  Plans for contraception: POPs vs CHCs (if not breastfeeding) vs depo provera  F/up 2 wks

## 2022-07-21 ENCOUNTER — ROUTINE PRENATAL (OUTPATIENT)
Dept: OBGYN CLINIC | Facility: CLINIC | Age: 22
End: 2022-07-21

## 2022-07-21 VITALS
HEIGHT: 64 IN | DIASTOLIC BLOOD PRESSURE: 66 MMHG | SYSTOLIC BLOOD PRESSURE: 118 MMHG | WEIGHT: 196.4 LBS | BODY MASS INDEX: 33.53 KG/M2

## 2022-07-21 DIAGNOSIS — O26.03 EXCESSIVE WEIGHT GAIN DURING PREGNANCY, THIRD TRIMESTER: Primary | ICD-10-CM

## 2022-07-21 DIAGNOSIS — Z3A.34 34 WEEKS GESTATION OF PREGNANCY: ICD-10-CM

## 2022-07-21 PROCEDURE — PNV: Performed by: PHYSICIAN ASSISTANT

## 2022-07-21 NOTE — PROGRESS NOTES
Patient is a 23 YO  female presenting to the office at 34 4 weeks for routine OB care  BP: 118/66  TWlb  Fetal Movement: yes good movement  Feeling OK today, having some post nasal drip  Recommend OTC antihistamine, zyrtec, claritin  Denies LOF, CTX, VB  GBS and GC to be collected next visit  Reviewed labor precautions  Call for concerns  RTO 2 weeks

## 2022-08-04 ENCOUNTER — ROUTINE PRENATAL (OUTPATIENT)
Dept: OBGYN CLINIC | Facility: CLINIC | Age: 22
End: 2022-08-04

## 2022-08-04 VITALS — DIASTOLIC BLOOD PRESSURE: 64 MMHG | SYSTOLIC BLOOD PRESSURE: 118 MMHG | BODY MASS INDEX: 34.23 KG/M2 | WEIGHT: 199.4 LBS

## 2022-08-04 DIAGNOSIS — Z34.03 ENCOUNTER FOR SUPERVISION OF NORMAL FIRST PREGNANCY IN THIRD TRIMESTER: Primary | ICD-10-CM

## 2022-08-04 DIAGNOSIS — Z3A.36 36 WEEKS GESTATION OF PREGNANCY: ICD-10-CM

## 2022-08-04 PROCEDURE — PNV: Performed by: OBSTETRICS & GYNECOLOGY

## 2022-08-04 PROCEDURE — 87491 CHLMYD TRACH DNA AMP PROBE: CPT | Performed by: OBSTETRICS & GYNECOLOGY

## 2022-08-04 PROCEDURE — 87150 DNA/RNA AMPLIFIED PROBE: CPT | Performed by: OBSTETRICS & GYNECOLOGY

## 2022-08-04 PROCEDURE — 87591 N.GONORRHOEAE DNA AMP PROB: CPT | Performed by: OBSTETRICS & GYNECOLOGY

## 2022-08-04 NOTE — PROGRESS NOTES
Pt doing well today  Denies any vaginal bleeding/leakage  GBS/GC-CH collected today  Urine dip neg/neg

## 2022-08-05 ENCOUNTER — NURSE TRIAGE (OUTPATIENT)
Dept: OTHER | Facility: OTHER | Age: 22
End: 2022-08-05

## 2022-08-05 ENCOUNTER — HOSPITAL ENCOUNTER (OUTPATIENT)
Facility: HOSPITAL | Age: 22
Discharge: HOME | End: 2022-08-06
Attending: OBSTETRICS & GYNECOLOGY | Admitting: OBSTETRICS & GYNECOLOGY
Payer: COMMERCIAL

## 2022-08-05 LAB
C TRACH DNA SPEC QL NAA+PROBE: NEGATIVE
N GONORRHOEA DNA SPEC QL NAA+PROBE: NEGATIVE

## 2022-08-05 PROCEDURE — NC001 PR NO CHARGE: Performed by: OBSTETRICS & GYNECOLOGY

## 2022-08-05 NOTE — TELEPHONE ENCOUNTER
Regarding: labor symltoms/  mucus plug  ----- Message from Allison Francois sent at 8/5/2022  5:28 PM EDT -----  Pt called, " I think I just lost my mucus plug  I am not experiencing contractions   What should I do?"

## 2022-08-05 NOTE — TELEPHONE ENCOUNTER
Reason for Disposition   Pinkish or brownish mucous discharge  (Exception: brief spotting after intercourse or pelvic exam)    Answer Assessment - Initial Assessment Questions  1  ONSET: "When did the symptoms begin?"         One hour ago saw mucous plug  2  CONTRACTIONS: "Describe the contractions that you are having " (e g , duration, frequency, regularity, severity)      none  3  GENIA: "What date are you expecting to deliver?"      36+ / 2022  4  PARITY: "Have you had a baby before?" If Yes, ask: "How long did the labor last?"      first  5  FETAL MOVEMENT: "Has the baby's movement decreased or changed significantly from normal?"      Baby is moving well  6   OTHER SYMPTOMS: "Do you have any other symptoms?" (e g , leaking fluid from vagina, fever, hand/facial swelling)      No other symptom    Protocols used: PREGNANCY - LABOR - -ADULT-

## 2022-08-06 VITALS
OXYGEN SATURATION: 96 % | RESPIRATION RATE: 18 BRPM | BODY MASS INDEX: 33.97 KG/M2 | HEIGHT: 64 IN | HEART RATE: 66 BPM | TEMPERATURE: 97.9 F | WEIGHT: 199 LBS | DIASTOLIC BLOOD PRESSURE: 78 MMHG | SYSTOLIC BLOOD PRESSURE: 126 MMHG

## 2022-08-06 LAB — GP B STREP DNA SPEC QL NAA+PROBE: NEGATIVE

## 2022-08-06 PROCEDURE — 76815 OB US LIMITED FETUS(S): CPT

## 2022-08-06 PROCEDURE — 99214 OFFICE O/P EST MOD 30 MIN: CPT

## 2022-08-06 PROCEDURE — NC001 PR NO CHARGE: Performed by: OBSTETRICS & GYNECOLOGY

## 2022-08-06 NOTE — PROGRESS NOTES
L&D Triage Note - OB/GYN  Katherin Tejada 24 y o  female MRN: [id number]  Unit/Bed#:  TRIAGE 3 Encounter: [id number]      ASSESSMENT:    Katherin Tejada is a 24 y o  Ayan Michel at 36w5d he was worked up today for rule labor  Due to her reassuring workup described below, it is felt to be safe to discharge her home at this time  PLAN:    1) speculum exam   -wet mount/KOH   Nitrazine   -pooling  2) cervical exam  3) LEONELA  4) Continue routine prenatal care  5) Discharge from [facility] triage with  labor precautions    - Reviewed rupture of membranes, false vs true labor, decreased fetal movement, and vaginal bleeding   - Pt to call provider with any concerns and follow up at her next scheduled prenatal appointment    - Case discussed with Dr Alycia Lucero:    Katherin Tejada 24 y o  Ayan Michel at 36w5d with an Estimated Date of Delivery: 22 who presents today with a chief complaint of an mild pain in her lower abdomen  She has also had some right-sided back pain  Of note earlier this afternoon she believes that she may have lost her mucus plug  This pregnancy has otherwise been uncomplicated       Contractions:  Unsure  Leakage of fluid:  Denied  Vaginal Bleeding:  Denies  Fetal movement: present denies    OBJECTIVE:    Vitals:    22 2312   BP: 136/79   Pulse: 90   Resp: 18   SpO2: 96%       ROS:  Constitutional: Negative  Respiratory: Negative  Cardiovascular: Negative    Gastrointestinal: Negative    General Physical Exam:  General: in no apparent distress, alert, oriented times 3 and normal vitals  Cardiovascular: Cor RRR  Lungs: non-labored breathing  Abdomen: abdomen is soft without significant tenderness, masses, organomegaly or guarding  Lower extremeties: nontender    Cervical Exam    SVE: 1 / 0% / -3    Fetal monitoring:  FHT:  120 bpm/ Moderate 6 - 25 bpm / 15 x 15 accelerations present, no decelerations  [place]: contractions Occasional noted     KOH/WTMT:     Infection:   - no clue cells - no hyphae   - no trichomonads present    Membrane status   - negative ferning   - negative nitrazene   - negative pooling     Imaging:          Abd  US   LEONELA       - Total:  12 0 cm   Placenta: 1   Presentation: Shady Eaton MD,  OBGYN PGY-1  8/5/2022 11:52 PM

## 2022-08-06 NOTE — PROCEDURES
Heather Hanna, a  at 36w6d with an GENIA of 2022, by Last Menstrual Period, was seen at [address] for the following procedure(s): $Procedure Type: LEONELA]          Total fluid: 12 cm  (Unable to supply images, US printer was not working)       Joe Post MD  OBGYN PGY-1  2022 12:30 AM

## 2022-08-12 ENCOUNTER — ROUTINE PRENATAL (OUTPATIENT)
Dept: OBGYN CLINIC | Facility: CLINIC | Age: 22
End: 2022-08-12

## 2022-08-12 VITALS — SYSTOLIC BLOOD PRESSURE: 120 MMHG | WEIGHT: 202.8 LBS | BODY MASS INDEX: 34.81 KG/M2 | DIASTOLIC BLOOD PRESSURE: 80 MMHG

## 2022-08-12 DIAGNOSIS — Z3A.37 37 WEEKS GESTATION OF PREGNANCY: Primary | ICD-10-CM

## 2022-08-12 DIAGNOSIS — Z34.03 ENCOUNTER FOR SUPERVISION OF NORMAL FIRST PREGNANCY IN THIRD TRIMESTER: ICD-10-CM

## 2022-08-12 PROCEDURE — PNV: Performed by: OBSTETRICS & GYNECOLOGY

## 2022-08-12 NOTE — PATIENT INSTRUCTIONS
Early Labor Signs   WHAT YOU NEED TO KNOW:   What are early labor signs? Early labor signs are the changes in your body that signal your baby is getting ready to be delivered  Early labor signs can happen weeks, days or hours before delivery  What are the signs and symptoms of early labor? Lightening  occurs when your baby drops inside your pelvis  You may feel increased pressure in your pelvis  This may happen a few weeks to a few hours before your labor begins  Contractions  are cramps and tightening that occur in your uterus to help move the baby through your birth canal  Contractions occur regularly and more often each time  Each one lasts about 30 to 70 seconds, and gets stronger until you deliver your baby  Contractions do not go away with movement  The pain usually starts in your lower back and moves to your abdomen  Effacement  occurs when your cervix softens and thins, so it can easily open for the baby  You will not be able to feel effacement  Your healthcare provider will examine your cervix for effacement  Dilation  is widening of your cervix  Your healthcare provider will examine your cervix for dilation  Your cervix may start to dilate weeks before your baby is delivered  Your cervix will be fully opened and ready for delivery when it is dilated to 10 centimeters  Increased discharge  from your vagina may occur  It may be brown, pink, clear, or slightly bloody  This discharge may also be called bloody show  Bloody show is a mucus plug that forms and blocks your cervix during pregnancy  The discharge may mean that your cervix is opening up and getting ready for delivery  Rupture of membranes  is a sudden release of clear fluid from your vagina  Ruptured membranes means your water broke  Your healthcare provider may need to break your water if it does not happen on its own  What is false labor? You may have false labor signs, which are also called [place] Pathak contractions  The patient is a 17y Male complaining of hand pain/injury. False labor is common and may happen several weeks or days before your actual labor  The contractions are not regular, and do not get closer together  The pain is usually mild, does not worsen, and is felt only in front  Yifan Pathak contractions may happen later in the day, and stop after you change position, walk, or rest   When should I call 911? You have heavy vaginal bleeding  You cannot get to the hospital before the baby starts to come out  When should I seek immediate care? You have regular, painful contractions that are less than 5 minutes apart and last 30 to 70 seconds each  You have a constant trickle or sudden gush of clear fluid from your vagina  You notice a sudden decrease in your baby's movement  When should I contact my healthcare provider? You have pain in your lower back or abdomen that does not get better when you change positions  You have bloody mucus or show  You have questions or concerns about your condition or care  CARE AGREEMENT:   You have the right to help plan your care  Learn about your health condition and how it may be treated  Discuss treatment options with your healthcare providers to decide what care you want to receive  You always have the right to refuse treatment  The above information is an  only  It is not intended as medical advice for individual conditions or treatments  Talk to your doctor, nurse or pharmacist before following any medical regimen to see if it is safe and effective for you  © Copyright [employer] 2022 Information is for End User's use only and may not be sold, redistributed or otherwise used for commercial purposes   All illustrations and images included in CareNotes® are the copyrighted property of [employer]  or [address] [place] [address] [employer]

## 2022-08-12 NOTE — PROGRESS NOTES
24 y o  Otilia Weber female at 37w6d (Estimated Date of Delivery: 22) for PNV  Pre-Whitney Vitals    Flowsheet Row Most Recent Value   Prenatal Assessment    Fetal Heart Rate 150   Movement Present   Prenatal Vitals    Blood Pressure 120/80   Weight - Scale 92 kg (202 lb 12 8 oz)   Urine Albumin/Glucose    Dilation/Effacement/Station    Vaginal Drainage    Edema         TW 2 kg (57 lb 12 8 oz)    Socorro presents for return OB visit  Feeling well  Denies regular contractions, vaginal bleeding, and leakage  Reports daily FM  SVE deferred  GBS negative  Results reviewed with patient  Labor precautions reviewed  Follow up in 1 week

## 2022-08-17 ENCOUNTER — ROUTINE PRENATAL (OUTPATIENT)
Dept: OBGYN CLINIC | Facility: CLINIC | Age: 22
End: 2022-08-17

## 2022-08-17 VITALS — WEIGHT: 208 LBS | SYSTOLIC BLOOD PRESSURE: 130 MMHG | DIASTOLIC BLOOD PRESSURE: 80 MMHG | BODY MASS INDEX: 35.7 KG/M2

## 2022-08-17 DIAGNOSIS — Z34.03 ENCOUNTER FOR SUPERVISION OF NORMAL FIRST PREGNANCY IN THIRD TRIMESTER: Primary | ICD-10-CM

## 2022-08-17 PROCEDURE — PNV: Performed by: OBSTETRICS & GYNECOLOGY

## 2022-08-17 NOTE — PROGRESS NOTES
24 y o  Rica Awe female at 38w3d (Estimated Date of Delivery: 22) for PNV      Pre- Vitals    Flowsheet Row Most Recent Value   Prenatal Assessment    Movement Present   Prenatal Vitals    Blood Pressure 130/80   Weight - Scale 94 3 kg (208 lb)   Urine Albumin/Glucose    Dilation/Effacement/Station    Vaginal Drainage    Edema         TW 6 kg (63 lb)  Reviewed labor precautions  Scheduled for IOL  at [facility]

## 2022-08-17 NOTE — PROGRESS NOTES
Patient states she is feeling well, no complaints  Would like a cervix check today      Urine neg/neg

## 2022-08-20 LAB
DME PARACHUTE DELIVERY DATE ACTUAL: NORMAL
DME PARACHUTE DELIVERY DATE EXPECTED: NORMAL
DME PARACHUTE DELIVERY DATE REQUESTED: NORMAL
DME PARACHUTE ITEM DESCRIPTION: NORMAL
DME PARACHUTE ORDER STATUS: NORMAL
DME PARACHUTE SUPPLIER NAME: NORMAL
DME PARACHUTE SUPPLIER PHONE: NORMAL

## 2022-08-21 ENCOUNTER — NURSE TRIAGE (OUTPATIENT)
Dept: OTHER | Facility: OTHER | Age: 22
End: 2022-08-21

## 2022-08-21 NOTE — TELEPHONE ENCOUNTER
39W0D, GENIA: 22    c/o constant mid back pain, with intermittent lower back rated 6/10 onset and worsening since 2200 on   Baseline FM  No additional symptoms  On call provider contacted and informed of patients concerns and status  Provider recommends Tylenol, water, warm bath/shower, can do heating pad on her lower back  If she has worsening pain, contractions at least every 5 min for an hour or thinks her water broke come in to L&D     Patient informed of providers recommendation  Verbalized understanding and agreeable with disposition  No further questions

## 2022-08-21 NOTE — TELEPHONE ENCOUNTER
Reason for Disposition   Back pain    Answer Assessment - Initial Assessment Questions  1  ONSET: "When did the pain begin?"      2200 8/20  2  LOCATION: "Where does it hurt?" (upper, mid or lower back)      Whole back Constant, lower back intermittent   3  SEVERITY: "How bad is the pain?"  (e g , Scale 1-10; mild, moderate, or severe)    - MILD (1-3): doesn't interfere with normal activities     - MODERATE (4-7): interferes with normal activities or awakens from sleep     - SEVERE (8-10): excruciating pain, unable to do any normal activities       6/10  4  PATTERN: "Is the pain constant?" (e g , yes, no; constant, intermittent)      Constant and intermittent   5  RADIATION: "Does the pain shoot into your legs or elsewhere?"     Denies   6  CAUSE:  "What do you think is causing the back pain?"       Unsure   7  BACK OVERUSE:  "Any recent lifting of heavy objects, strenuous work or exercise?"      Denies   8  MEDICATIONS: "What have you taken so far for the pain?" (e g , nothing, acetaminophen)   Denies   9  NEUROLOGIC SYMPTOMS: "Do you have any weakness, numbness, or problems with bowel/bladder control?"     Denies   10  OTHER SYMPTOMS: "Do you have any other symptoms?" (e g , fever, abdominal pain, burning with urination, blood in urine, fluid leaking from vagina)      Denies   11   GENIA: "What date are you expecting to deliver?"        8/28    Protocols used: PREGNANCY - BACK PAIN-ADULT-

## 2022-08-22 ENCOUNTER — HOSPITAL ENCOUNTER (OUTPATIENT)
Dept: LABOR AND DELIVERY | Facility: HOSPITAL | Age: 22
Discharge: HOME | End: 2022-08-22
Payer: COMMERCIAL

## 2022-08-22 ENCOUNTER — HOSPITAL ENCOUNTER (INPATIENT)
Facility: HOSPITAL | Age: 22
LOS: 3 days | Discharge: HOME | End: 2022-08-25
Attending: OBSTETRICS & GYNECOLOGY | Admitting: OBSTETRICS & GYNECOLOGY
Payer: COMMERCIAL

## 2022-08-22 DIAGNOSIS — Z3A.37 37 WEEKS GESTATION OF PREGNANCY: ICD-10-CM

## 2022-08-22 PROBLEM — Z3A.39 39 WEEKS GESTATION OF PREGNANCY: Status: ACTIVE | Noted: 2022-02-18

## 2022-08-22 LAB
ABO GROUP BLD: NORMAL
BASOPHILS # BLD AUTO: 0.04 THOUSANDS/ΜL (ref 0–0.1)
BASOPHILS NFR BLD AUTO: 0 % (ref 0–1)
BLD GP AB SCN SERPL QL: NEGATIVE
EOSINOPHIL # BLD AUTO: 0.13 THOUSAND/ΜL (ref 0–0.61)
EOSINOPHIL NFR BLD AUTO: 1 % (ref 0–6)
ERYTHROCYTE [DISTWIDTH] IN BLOOD BY AUTOMATED COUNT: 13.1 % (ref 11.6–15.1)
HCT VFR BLD AUTO: 35.1 % (ref 34.8–46.1)
HGB BLD-MCNC: 11.9 G/DL (ref 11.5–15.4)
IMM GRANULOCYTES # BLD AUTO: 0.08 THOUSAND/UL (ref 0–0.2)
IMM GRANULOCYTES NFR BLD AUTO: 1 % (ref 0–2)
LYMPHOCYTES # BLD AUTO: 2.22 THOUSANDS/ΜL (ref 0.6–4.47)
LYMPHOCYTES NFR BLD AUTO: 21 % (ref 14–44)
MCH RBC QN AUTO: 31.5 PG (ref 26.8–34.3)
MCHC RBC AUTO-ENTMCNC: 33.9 G/DL (ref 31.4–37.4)
MCV RBC AUTO: 93 FL (ref 82–98)
MONOCYTES # BLD AUTO: 0.75 THOUSAND/ΜL (ref 0.17–1.22)
MONOCYTES NFR BLD AUTO: 7 % (ref 4–12)
NEUTROPHILS # BLD AUTO: 7.42 THOUSANDS/ΜL (ref 1.85–7.62)
NEUTS SEG NFR BLD AUTO: 70 % (ref 43–75)
NRBC BLD AUTO-RTO: 0 /100 WBCS
PLATELET # BLD AUTO: 213 THOUSANDS/UL (ref 149–390)
PMV BLD AUTO: 8.7 FL (ref 8.9–12.7)
RBC # BLD AUTO: 3.78 MILLION/UL (ref 3.81–5.12)
RH BLD: POSITIVE
SPECIMEN EXPIRATION DATE: NORMAL
WBC # BLD AUTO: 10.64 THOUSAND/UL (ref 4.31–10.16)

## 2022-08-22 PROCEDURE — 86592 SYPHILIS TEST NON-TREP QUAL: CPT | Performed by: OBSTETRICS & GYNECOLOGY

## 2022-08-22 PROCEDURE — 3E0P7VZ INTRODUCTION OF HORMONE INTO FEMALE REPRODUCTIVE, VIA NATURAL OR ARTIFICIAL OPENING: ICD-10-PCS | Performed by: OBSTETRICS & GYNECOLOGY

## 2022-08-22 PROCEDURE — 3E033VJ INTRODUCTION OF OTHER HORMONE INTO PERIPHERAL VEIN, PERCUTANEOUS APPROACH: ICD-10-PCS | Performed by: OBSTETRICS & GYNECOLOGY

## 2022-08-22 PROCEDURE — 86901 BLOOD TYPING SEROLOGIC RH(D): CPT | Performed by: OBSTETRICS & GYNECOLOGY

## 2022-08-22 PROCEDURE — 86850 RBC ANTIBODY SCREEN: CPT | Performed by: OBSTETRICS & GYNECOLOGY

## 2022-08-22 PROCEDURE — 85025 COMPLETE CBC W/AUTO DIFF WBC: CPT | Performed by: OBSTETRICS & GYNECOLOGY

## 2022-08-22 PROCEDURE — NC001 PR NO CHARGE: Performed by: OBSTETRICS & GYNECOLOGY

## 2022-08-22 PROCEDURE — 4A1HXCZ MONITORING OF PRODUCTS OF CONCEPTION, CARDIAC RATE, EXTERNAL APPROACH: ICD-10-PCS | Performed by: OBSTETRICS & GYNECOLOGY

## 2022-08-22 PROCEDURE — 10907ZC DRAINAGE OF AMNIOTIC FLUID, THERAPEUTIC FROM PRODUCTS OF CONCEPTION, VIA NATURAL OR ARTIFICIAL OPENING: ICD-10-PCS | Performed by: OBSTETRICS & GYNECOLOGY

## 2022-08-22 PROCEDURE — 86900 BLOOD TYPING SEROLOGIC ABO: CPT | Performed by: OBSTETRICS & GYNECOLOGY

## 2022-08-22 RX ORDER — SODIUM CHLORIDE, SODIUM LACTATE, POTASSIUM CHLORIDE, CALCIUM CHLORIDE 600; 310; 30; 20 MG/100ML; MG/100ML; MG/100ML; MG/100ML
125 INJECTION, SOLUTION INTRAVENOUS CONTINUOUS
Status: DISCONTINUED | OUTPATIENT
Start: 2022-08-22 | End: 2022-08-23

## 2022-08-22 RX ORDER — ONDANSETRON 2 MG/ML
4 INJECTION INTRAMUSCULAR; INTRAVENOUS EVERY 6 HOURS PRN
Status: DISCONTINUED | OUTPATIENT
Start: 2022-08-22 | End: 2022-08-23

## 2022-08-22 RX ORDER — OXYTOCIN/RINGER'S LACTATE 30/500 ML
1-30 PLASTIC BAG, INJECTION (ML) INTRAVENOUS
Status: DISCONTINUED | OUTPATIENT
Start: 2022-08-23 | End: 2022-08-23

## 2022-08-22 RX ADMIN — SODIUM CHLORIDE, SODIUM LACTATE, POTASSIUM CHLORIDE, AND CALCIUM CHLORIDE 125 ML/HR: .6; .31; .03; .02 INJECTION, SOLUTION INTRAVENOUS at 17:05

## 2022-08-22 RX ADMIN — Medication 25 MCG: at 18:32

## 2022-08-22 NOTE — OB LABOR/OXYTOCIN SAFETY PROGRESS
Labor Progress Note - Socorro Meyersrbeg 24 y o  female MRN: [id number]    Unit/Bed#: -01 Encounter: [id number]       Contraction Frequency (minutes): Irritability  Contraction Quality: Not applicable      Cervical Dilation: 1        Cervical Effacement: 50  Fetal Station: -2  Baseline Rate: 150 bpm  Fetal Heart Rate: 158 BPM  FHR Category: Category II               Vital Signs:   Vitals:    08/22/22 1600   BP: 131/75   Pulse: 82   Resp: 16   Temp: 98 °F (36 7 °C)   SpO2: 100%       Notes/comments: SVE 1/50/-2, FHT Cat I  FB placed and cytotec  Discussed with attending          Karine Kohler MD 8/22/2022 6:38 PM

## 2022-08-22 NOTE — PLAN OF CARE
Problem: PAIN - ADULT  Goal: Verbalizes/displays adequate comfort level or baseline comfort level  Description: Interventions:  - Encourage patient to monitor pain and request assistance  - Assess pain using appropriate pain scale  - Administer analgesics based on type and severity of pain and evaluate response  - Implement non-pharmacological measures as appropriate and evaluate response  - Consider cultural and social influences on pain and pain management  - Notify physician/advanced practitioner if interventions unsuccessful or patient reports new pain  Outcome: Progressing     Problem: INFECTION - ADULT  Goal: Absence or prevention of progression during hospitalization  Description: INTERVENTIONS:  - Assess and monitor for signs and symptoms of infection  - Monitor lab/diagnostic results  - Monitor all insertion sites, i e  indwelling lines, tubes, and drains  - Monitor endotracheal if appropriate and nasal secretions for changes in amount and color  - Gail appropriate cooling/warming therapies per order  - Administer medications as ordered  - Instruct and encourage patient and family to use good hand hygiene technique  - Identify and instruct in appropriate isolation precautions for identified infection/condition  Outcome: Progressing  Goal: Absence of fever/infection during neutropenic period  Description: INTERVENTIONS:  - Monitor WBC    Outcome: Progressing     Problem: SAFETY ADULT  Goal: Patient will remain free of falls  Description: INTERVENTIONS:  - Educate patient/family on patient safety including physical limitations  - Instruct patient to call for assistance with activity   - Consult OT/PT to assist with strengthening/mobility   - Keep Call bell within reach  - Keep bed low and locked with side rails adjusted as appropriate  - Keep care items and personal belongings within reach  - Initiate and maintain comfort rounds  - Make Fall Risk Sign visible to staff  - Offer Toileting every hours, in advance of need  - Initiate/Maintain alarm  - Obtain necessary fall risk management equipment:   - Apply yellow socks and bracelet for high fall risk patients  - Consider moving patient to room near nurses station  Outcome: Progressing  Goal: Maintain or return to baseline ADL function  Description: INTERVENTIONS:  -  Assess patient's ability to carry out ADLs; assess patient's baseline for ADL function and identify physical deficits which impact ability to perform ADLs (bathing, care of mouth/teeth, toileting, grooming, dressing, etc )  - Assess/evaluate cause of self-care deficits   - Assess range of motion  - Assess patient's mobility; develop plan if impaired  - Assess patient's need for assistive devices and provide as appropriate  - Encourage maximum independence but intervene and supervise when necessary  - Involve family in performance of ADLs  - Assess for home care needs following discharge   - Consider OT consult to assist with ADL evaluation and planning for discharge  - Provide patient education as appropriate  Outcome: Progressing  Goal: Maintains/Returns to pre admission functional level  Description: INTERVENTIONS:  - Perform BMAT or MOVE assessment daily    - Set and communicate daily mobility goal to care team and patient/family/caregiver  - Collaborate with rehabilitation services on mobility goals if consulted  - Perform Range of Motion times a day  - Reposition patient every hours    - Dangle patient times a day  - Stand patient times a day  - Ambulate patient times a day  - Out of bed to chair times a day   - Out of bed for meals times a day  - Out of bed for toileting  - Record patient progress and toleration of activity level   Outcome: Progressing     Problem: Knowledge Deficit  Goal: Patient/family/caregiver demonstrates understanding of disease process, treatment plan, medications, and discharge instructions  Description: Complete learning assessment and assess knowledge base   Interventions:  - Provide teaching at level of understanding  - Provide teaching via preferred learning methods  Outcome: Progressing     Problem: DISCHARGE PLANNING  Goal: Discharge to home or other facility with appropriate resources  Description: INTERVENTIONS:  - Identify barriers to discharge w/patient and caregiver  - Arrange for needed discharge resources and transportation as appropriate  - Identify discharge learning needs (meds, wound care, etc )  - Arrange for interpretive services to assist at discharge as needed  - Refer to Case Management Department for coordinating discharge planning if the patient needs post-hospital services based on physician/advanced practitioner order or complex needs related to functional status, cognitive ability, or social support system  Outcome: Progressing     Problem: BIRTH - VAGINAL/ SECTION  Goal: Fetal and maternal status remain reassuring during the birth process  Description: INTERVENTIONS:  - Monitor vital signs  - Monitor fetal heart rate  - Monitor uterine activity  - Monitor labor progression (vaginal delivery)  - DVT prophylaxis  - Antibiotic prophylaxis  Outcome: Progressing  Goal: Emotionally satisfying birthing experience for mother/fetus  Description: Interventions:  - Assess, plan, implement and evaluate the nursing care given to the patient in labor  - Advocate the philosophy that each childbirth experience is a unique experience and support the family's chosen level of involvement and control during the labor process   - Actively participate in both the patient's and family's teaching of the birth process  - Consider cultural, Adventism and age-specific factors and plan care for the patient in labor  Outcome: Progressing

## 2022-08-22 NOTE — H&P
H&P - Obstetrics   Leigh Weber 24 y o  female MRN: [id number]  Unit/Bed#: -01 Encounter: [id number]    Assessment and Plan:  24 y o  Yamileth Flannery at 36w3d who is being admitted for elective induction  By issue:    * 39 weeks gestation of pregnancy  Assessment & Plan  Cephalic presentation confirmed by ultrasound  Clinical EFW by [facility]opold's: 7-8lbs  GBS negative status  Plan for ripening with Cytotec and Dykes  Analgesia and/or epidural at patient request  Follow up CBC, RPR, blood type & antibody screen        Plan of care discussed with Dr Camila Bolanos  This patient will be an INPATIENT and I certify the anticipated length of stay is >2 Midnights  History of Present Illness     Chief Complaint: "I'm here for my induction"    History of Present Illness:  Leigh Weber is a 24 y o  Yamilethbunny Flannery with an GENIA of 2022, by Last Menstrual Period at 39w1d weeks gestation who presents for scheduled induction of labor  No contractions, loss of fluid or vaginal bleeding  Feeling fetal movement  ROS otherwise negative  Obstetrician: [address] [place] [address]    Pregnancy complications:   1  Obesity: BMI 35kg/m2    OB History    Para Term  AB Living   1 0 0 0 0 0   SAB IAB Ectopic Multiple Live Births   0 0 0 0 0      # Outcome Date GA Lbr Alfredo/2nd Weight Sex Delivery Anes PTL Lv   1 Current               Obstetric Comments   Menarche 14       Baby complications/comments: EFW 1979g- 4lb 6oz (42%) on 22    Review of Systems  12-point ROS negative unless stated in HPI      Historical Information   Past Medical History:   Diagnosis Date    Allergic rhinitis     Eczema     Kidney stone     Varicella     immunized     Past Surgical History:   Procedure Laterality Date    WISDOM TOOTH EXTRACTION       Social History   Social History     Substance and Sexual Activity   Alcohol Use Not Currently    Comment: occ     Social History     Substance and Sexual Activity   Drug Use No     Social History     Tobacco Use   Smoking Status Former Smoker    Packs/day: 0 25    Years: 5 00    Pack years: 1 25    Types: Cigarettes    Quit date:     Years since quittin 6   Smokeless Tobacco Never Used   Tobacco Comment    vapes     Family History: non-contributory    Meds/Allergies      Medications Prior to Admission   Medication    acetaminophen (TYLENOL) 325 mg tablet    Prenatal MV-Min-Fe Fum-FA-DHA (PRENATAL 1 PO)      No Known Allergies    Objective:  Vitals: Blood pressure 131/75, pulse 82, temperature 98 °F (36 7 °C), temperature source Oral, resp  rate 16, last menstrual period 2021, SpO2 100 %  There is no height or weight on file to calculate BMI  Physical Exam  GEN: alert and oriented x 3, no apparent distress, appears well  CARDIAC: regular rate and rhythm  PULMONARY: normal effort, no respiratory distress, CTA bilaterally  ABDOMEN: gravid, soft, no tenderness  GENITOURINARY: normal external female genitalia  Cervix 1/50/-3, posterior and medium consistency  EXTREMITIES: nontender, trace bilateral edema  FETAL ASSESSMENT:  Fetal heart rate: 140/moderate variability/15x15 accelerations/no decelerations; reactive  Justin: irritability, rare contractions    Prenatal Labs: I have personally reviewed pertinent reports  Blood type: B+  Antibody screen: negative  HIV: negative  Hepatitis B surface antigen: negative  Hepatitis C antibody: negative  RPR: non-reactive  Gonorrhea/Chlamydia: negative  Rubella: Immune  Varicella: Immune  1 hour Glucose: 108mg/dL  Group B strep: negative  Last hemoglobin: 12 4g/dL  Last Platelets: 621O    Invasive Devices  Timeline    None                     Laurence Frank MD  PGY III, OB/GYN  2022, 4:58 PM

## 2022-08-22 NOTE — PLAN OF CARE
Problem: PAIN - ADULT  Goal: Verbalizes/displays adequate comfort level or baseline comfort level  Description: Interventions:  - Encourage patient to monitor pain and request assistance  - Assess pain using appropriate pain scale  - Administer analgesics based on type and severity of pain and evaluate response  - Implement non-pharmacological measures as appropriate and evaluate response  - Consider cultural and social influences on pain and pain management  - Notify physician/advanced practitioner if interventions unsuccessful or patient reports new pain  Outcome: Progressing     Problem: INFECTION - ADULT  Goal: Absence or prevention of progression during hospitalization  Description: INTERVENTIONS:  - Assess and monitor for signs and symptoms of infection  - Monitor lab/diagnostic results  - Monitor all insertion sites, i e  indwelling lines, tubes, and drains  - Monitor endotracheal if appropriate and nasal secretions for changes in amount and color  - [place] appropriate cooling/warming therapies per order  - Administer medications as ordered  - Instruct and encourage patient and family to use good hand hygiene technique  - Identify and instruct in appropriate isolation precautions for identified infection/condition  Outcome: Progressing  Goal: Absence of fever/infection during neutropenic period  Description: INTERVENTIONS:  - Monitor WBC    Outcome: Progressing     Problem: SAFETY ADULT  Goal: Patient will remain free of falls  Description: INTERVENTIONS:  - Educate patient/family on patient safety including physical limitations  - Instruct patient to call for assistance with activity   - Consult OT/PT to assist with strengthening/mobility   - Keep Call bell within reach  - Keep bed low and locked with side rails adjusted as appropriate  - Keep care items and personal belongings within reach  - Initiate and maintain comfort rounds  - Make Fall Risk Sign visible to staff  - Offer Toileting every hours, in advance of need  - Initiate/Maintain alarm  - Obtain necessary fall risk management equipment:   - Apply yellow socks and bracelet for high fall risk patients  - Consider moving patient to room near nurses station  Outcome: Progressing  Goal: Maintain or return to baseline ADL function  Description: INTERVENTIONS:  -  Assess patient's ability to carry out ADLs; assess patient's baseline for ADL function and identify physical deficits which impact ability to perform ADLs (bathing, care of mouth/teeth, toileting, grooming, dressing, etc )  - Assess/evaluate cause of self-care deficits   - Assess range of motion  - Assess patient's mobility; develop plan if impaired  - Assess patient's need for assistive devices and provide as appropriate  - Encourage maximum independence but intervene and supervise when necessary  - Involve family in performance of ADLs  - Assess for home care needs following discharge   - Consider OT consult to assist with ADL evaluation and planning for discharge  - Provide patient education as appropriate  Outcome: Progressing  Goal: Maintains/Returns to pre admission functional level  Description: INTERVENTIONS:  - Perform BMAT or MOVE assessment daily    - Set and communicate daily mobility goal to care team and patient/family/caregiver  - Collaborate with rehabilitation services on mobility goals if consulted  - Perform Range of Motion times a day  - Reposition patient every hours    - Dangle patient times a day  - Stand patient times a day  - Ambulate patient times a day  - Out of bed to chair times a day   - Out of bed for meals times a day  - Out of bed for toileting  - Record patient progress and toleration of activity level   Outcome: Progressing     Problem: Knowledge Deficit  Goal: Patient/family/caregiver demonstrates understanding of disease process, treatment plan, medications, and discharge instructions  Description: Complete learning assessment and assess knowledge base   Interventions:  - Provide teaching at level of understanding  - Provide teaching via preferred learning methods  Outcome: Progressing     Problem: DISCHARGE PLANNING  Goal: Discharge to home or other facility with appropriate resources  Description: INTERVENTIONS:  - Identify barriers to discharge w/patient and caregiver  - Arrange for needed discharge resources and transportation as appropriate  - Identify discharge learning needs (meds, wound care, etc )  - Arrange for interpretive services to assist at discharge as needed  - Refer to Case Management Department for coordinating discharge planning if the patient needs post-hospital services based on physician/advanced practitioner order or complex needs related to functional status, cognitive ability, or social support system  Outcome: Progressing     Problem: BIRTH - VAGINAL/ SECTION  Goal: Fetal and maternal status remain reassuring during the birth process  Description: INTERVENTIONS:  - Monitor vital signs  - Monitor fetal heart rate  - Monitor uterine activity  - Monitor labor progression (vaginal delivery)  - DVT prophylaxis  - Antibiotic prophylaxis  Outcome: Progressing  Goal: Emotionally satisfying birthing experience for mother/fetus  Description: Interventions:  - Assess, plan, implement and evaluate the nursing care given to the patient in labor  - Advocate the philosophy that each childbirth experience is a unique experience and support the family's chosen level of involvement and control during the labor process   - Actively participate in both the patient's and family's teaching of the birth process  - Consider cultural, Congregation and age-specific factors and plan care for the patient in labor  Outcome: Progressing

## 2022-08-23 ENCOUNTER — ANESTHESIA EVENT (INPATIENT)
Dept: ANESTHESIOLOGY | Facility: HOSPITAL | Age: 22
End: 2022-08-23
Payer: COMMERCIAL

## 2022-08-23 ENCOUNTER — ANESTHESIA (INPATIENT)
Dept: ANESTHESIOLOGY | Facility: HOSPITAL | Age: 22
End: 2022-08-23
Payer: COMMERCIAL

## 2022-08-23 LAB
BASE EXCESS BLDCOA CALC-SCNC: -5.4 MMOL/L (ref 3–11)
BASE EXCESS BLDCOV CALC-SCNC: -4 MMOL/L (ref 1–9)
HCO3 BLDCOA-SCNC: 20.4 MMOL/L (ref 17.3–27.3)
HCO3 BLDCOV-SCNC: 20.9 MMOL/L (ref 12.2–28.6)
O2 CT VFR BLDCOA CALC: 8.7 ML/DL
OXYHGB MFR BLDCOA: 44.5 %
OXYHGB MFR BLDCOV: 72.5 %
PCO2 BLDCOA: 40.7 MM[HG] (ref 30–60)
PCO2 BLDCOV: 38 MM HG (ref 27–43)
PH BLDCOA: 7.32 [PH] (ref 7.23–7.43)
PH BLDCOV: 7.36 [PH] (ref 7.19–7.49)
PO2 BLDCOA: 19.8 MM HG (ref 5–25)
PO2 BLDCOV: 29.1 MM HG (ref 15–45)
RPR SER QL: NORMAL
SAO2 % BLDCOV: 15.9 ML/DL

## 2022-08-23 PROCEDURE — 59400 OBSTETRICAL CARE: CPT | Performed by: OBSTETRICS & GYNECOLOGY

## 2022-08-23 PROCEDURE — 82805 BLOOD GASES W/O2 SATURATION: CPT | Performed by: OBSTETRICS & GYNECOLOGY

## 2022-08-23 PROCEDURE — 0UQMXZZ REPAIR VULVA, EXTERNAL APPROACH: ICD-10-PCS | Performed by: OBSTETRICS & GYNECOLOGY

## 2022-08-23 RX ORDER — DIAPER,BRIEF,INFANT-TODD,DISP
1 EACH MISCELLANEOUS DAILY PRN
Status: DISCONTINUED | OUTPATIENT
Start: 2022-08-23 | End: 2022-08-25 | Stop reason: HOSPADM

## 2022-08-23 RX ORDER — CALCIUM CARBONATE 200(500)MG
1000 TABLET,CHEWABLE ORAL DAILY PRN
Status: DISCONTINUED | OUTPATIENT
Start: 2022-08-23 | End: 2022-08-25 | Stop reason: HOSPADM

## 2022-08-23 RX ORDER — DIPHENHYDRAMINE HCL 25 MG
25 TABLET ORAL EVERY 6 HOURS PRN
Status: DISCONTINUED | OUTPATIENT
Start: 2022-08-23 | End: 2022-08-25 | Stop reason: HOSPADM

## 2022-08-23 RX ORDER — DOCUSATE SODIUM 100 MG/1
100 CAPSULE, LIQUID FILLED ORAL 2 TIMES DAILY
Status: DISCONTINUED | OUTPATIENT
Start: 2022-08-23 | End: 2022-08-25 | Stop reason: HOSPADM

## 2022-08-23 RX ORDER — OXYTOCIN/RINGER'S LACTATE 30/500 ML
250 PLASTIC BAG, INJECTION (ML) INTRAVENOUS ONCE
Status: DISCONTINUED | OUTPATIENT
Start: 2022-08-23 | End: 2022-08-25 | Stop reason: HOSPADM

## 2022-08-23 RX ORDER — BUTORPHANOL TARTRATE 1 MG/ML
1 INJECTION, SOLUTION INTRAMUSCULAR; INTRAVENOUS
Status: DISCONTINUED | OUTPATIENT
Start: 2022-08-23 | End: 2022-08-23

## 2022-08-23 RX ORDER — ONDANSETRON 2 MG/ML
4 INJECTION INTRAMUSCULAR; INTRAVENOUS EVERY 8 HOURS PRN
Status: DISCONTINUED | OUTPATIENT
Start: 2022-08-23 | End: 2022-08-25 | Stop reason: HOSPADM

## 2022-08-23 RX ORDER — LIDOCAINE HYDROCHLORIDE AND EPINEPHRINE 15; 5 MG/ML; UG/ML
INJECTION, SOLUTION EPIDURAL AS NEEDED
Status: DISCONTINUED | OUTPATIENT
Start: 2022-08-23 | End: 2022-08-26 | Stop reason: HOSPADM

## 2022-08-23 RX ORDER — IBUPROFEN 600 MG/1
600 TABLET ORAL EVERY 6 HOURS
Status: DISCONTINUED | OUTPATIENT
Start: 2022-08-23 | End: 2022-08-25 | Stop reason: HOSPADM

## 2022-08-23 RX ORDER — ACETAMINOPHEN 325 MG/1
650 TABLET ORAL EVERY 4 HOURS PRN
Status: DISCONTINUED | OUTPATIENT
Start: 2022-08-23 | End: 2022-08-25 | Stop reason: HOSPADM

## 2022-08-23 RX ADMIN — ROPIVACAINE HYDROCHLORIDE: 2 INJECTION, SOLUTION EPIDURAL; INFILTRATION at 06:47

## 2022-08-23 RX ADMIN — SODIUM CHLORIDE, SODIUM LACTATE, POTASSIUM CHLORIDE, AND CALCIUM CHLORIDE 125 ML/HR: .6; .31; .03; .02 INJECTION, SOLUTION INTRAVENOUS at 06:47

## 2022-08-23 RX ADMIN — ACETAMINOPHEN 650 MG: 325 TABLET ORAL at 23:13

## 2022-08-23 RX ADMIN — BUTORPHANOL TARTRATE 1 MG: 1 INJECTION, SOLUTION INTRAMUSCULAR; INTRAVENOUS at 04:46

## 2022-08-23 RX ADMIN — SODIUM CHLORIDE, SODIUM LACTATE, POTASSIUM CHLORIDE, AND CALCIUM CHLORIDE 125 ML/HR: .6; .31; .03; .02 INJECTION, SOLUTION INTRAVENOUS at 00:38

## 2022-08-23 RX ADMIN — IBUPROFEN 600 MG: 600 TABLET ORAL at 18:33

## 2022-08-23 RX ADMIN — Medication 2 MILLI-UNITS/MIN: at 00:36

## 2022-08-23 RX ADMIN — Medication 1 APPLICATION: at 13:52

## 2022-08-23 RX ADMIN — LIDOCAINE HYDROCHLORIDE AND EPINEPHRINE 5 ML: 15; 5 INJECTION, SOLUTION EPIDURAL at 06:39

## 2022-08-23 NOTE — OB LABOR/OXYTOCIN SAFETY PROGRESS
Oxytocin Safety Progress Check Note - Cleo Hurley 24 y o  female MRN: [id number]    Unit/Bed#: -01 Encounter: [id number]    Dose (fanta-units/min) Oxytocin: 6 fanta-units/min  Contraction Frequency (minutes): 1-4  Contraction Quality: Moderate  Tachysystole: Yes   Cervical Dilation: 8        Cervical Effacement: 90  Fetal Station: -2  Baseline Rate: 125 bpm  Fetal Heart Rate: 120 BPM  FHR Category: Category I               Vital Signs:   Vitals:    08/23/22 0745   BP:    Pulse:    Resp:    Temp: 98 °F (36 7 °C)   SpO2:    /65   Pulse (!) 48   Temp 98 °F (36 7 °C) (Oral)   Resp 19   Ht 5' 4" (1 626 m)   Wt 94 3 kg (208 lb)   LMP 11/21/2021 (Exact Date)   SpO2 94%   BMI 35 70 kg/m²       Notes/comments:   Expectant management  Continue pitocin           [address] [place], [place] 1/78/9938 0:59 AM

## 2022-08-23 NOTE — ANESTHESIA PREPROCEDURE EVALUATION
Procedure:  LABOR ANALGESIA    Relevant Problems   GYN   (+) 39 weeks gestation of pregnancy      MUSCULOSKELETAL   (+) Trapezius muscle spasm      NEURO/PSYCH   (+) Intractable acute post-traumatic headache      PULMONARY   (+) Smoking        Physical Exam    Airway    Mallampati score: II  TM Distance: >3 FB  Neck ROM: full     Dental   No notable dental hx     Cardiovascular      Pulmonary      Other Findings        Anesthesia Plan  ASA Score- 2     Anesthesia Type- epidural with ASA Monitors  Additional Monitors:   Airway Plan:           Plan Factors-Exercise tolerance (METS): >4 METS  Chart reviewed  Existing labs reviewed  Patient summary reviewed  Patient is not a current smoker  Induction-     Postoperative Plan-     Informed Consent- Anesthetic plan and risks discussed with patient  I personally reviewed this patient with the CRNA  Discussed and agreed on the Anesthesia Plan with the CRNA  Lala Beaulieu

## 2022-08-23 NOTE — OB LABOR/OXYTOCIN SAFETY PROGRESS
Oxytocin Safety Progress Check Note - Socorro Wen 24 y o  female MRN: [id number]    Unit/Bed#: -01 Encounter: [id number]    Dose (fanta-units/min) Oxytocin: 6 fanta-units/min  Contraction Frequency (minutes): 1-3  Contraction Quality: Mild  Tachysystole: No   Cervical Dilation: 6        Cervical Effacement: 50  Fetal Station: -2  Baseline Rate: 135 bpm  Fetal Heart Rate: 151 BPM  FHR Category: Category I               Vital Signs:   Vitals:    08/23/22 0310   BP: 106/58   Pulse: (!) 53   Resp:    Temp:    SpO2:        Notes/comments: Patient uncomfortable and requesting epidural  SVE 6/50/-2, FHT Cat I, toco q2  Discussed with attending          Pa Davis MD 8/23/2022 3:23 AM

## 2022-08-23 NOTE — OB LABOR/OXYTOCIN SAFETY PROGRESS
Oxytocin Safety Progress Check Note - Socorro Wen 24 y o  female MRN: [id number]    Unit/Bed#: -01 Encounter: [id number]    Dose (fanta-units/min) Oxytocin: 6 fanta-units/min  Contraction Frequency (minutes): 1-3  Contraction Quality: Moderate  Tachysystole: No   Cervical Dilation: Lip/rim (Comment)        Cervical Effacement: 90  Fetal Station: 2  Baseline Rate: 130 bpm  Fetal Heart Rate: 130 BPM  FHR Category: Category I               Vital Signs:   Vitals:    08/23/22 0845   BP: 119/68   Pulse: (!) 49   Resp:    Temp:    SpO2:        Notes/comments:   FHT:  Baseline of 130/moderate variability/15 x 15 accels present/no decelerations  Category 1 tracing  On cervical recheck patient is now 9 5/90/+2    Patient is currently comfortable with epidural              Mariaelena Chakraborty MD 8/23/2022 10:11 AM

## 2022-08-23 NOTE — PLAN OF CARE
Problem: PAIN - ADULT  Goal: Verbalizes/displays adequate comfort level or baseline comfort level  Description: Interventions:  - Encourage patient to monitor pain and request assistance  - Assess pain using appropriate pain scale  - Administer analgesics based on type and severity of pain and evaluate response  - Implement non-pharmacological measures as appropriate and evaluate response  - Consider cultural and social influences on pain and pain management  - Notify physician/advanced practitioner if interventions unsuccessful or patient reports new pain  Outcome: Progressing     Problem: INFECTION - ADULT  Goal: Absence or prevention of progression during hospitalization  Description: INTERVENTIONS:  - Assess and monitor for signs and symptoms of infection  - Monitor lab/diagnostic results  - Monitor all insertion sites, i e  indwelling lines, tubes, and drains  - Monitor endotracheal if appropriate and nasal secretions for changes in amount and color  - [place] appropriate cooling/warming therapies per order  - Administer medications as ordered  - Instruct and encourage patient and family to use good hand hygiene technique  - Identify and instruct in appropriate isolation precautions for identified infection/condition  Outcome: Progressing  Goal: Absence of fever/infection during neutropenic period  Description: INTERVENTIONS:  - Monitor WBC    Outcome: Progressing     Problem: SAFETY ADULT  Goal: Patient will remain free of falls  Description: INTERVENTIONS:  - Educate patient/family on patient safety including physical limitations  - Instruct patient to call for assistance with activity   - Consult OT/PT to assist with strengthening/mobility   - Keep Call bell within reach  - Keep bed low and locked with side rails adjusted as appropriate  - Keep care items and personal belongings within reach  - Initiate and maintain comfort rounds  - Make Fall Risk Sign visible to staff  - Offer Toileting every hours, in advance of need  - Initiate/Maintain alarm  - Obtain necessary fall risk management equipment:   - Apply yellow socks and bracelet for high fall risk patients  - Consider moving patient to room near nurses station  Outcome: Progressing  Goal: Maintain or return to baseline ADL function  Description: INTERVENTIONS:  -  Assess patient's ability to carry out ADLs; assess patient's baseline for ADL function and identify physical deficits which impact ability to perform ADLs (bathing, care of mouth/teeth, toileting, grooming, dressing, etc )  - Assess/evaluate cause of self-care deficits   - Assess range of motion  - Assess patient's mobility; develop plan if impaired  - Assess patient's need for assistive devices and provide as appropriate  - Encourage maximum independence but intervene and supervise when necessary  - Involve family in performance of ADLs  - Assess for home care needs following discharge   - Consider OT consult to assist with ADL evaluation and planning for discharge  - Provide patient education as appropriate  Outcome: Progressing  Goal: Maintains/Returns to pre admission functional level  Description: INTERVENTIONS:  - Perform BMAT or MOVE assessment daily    - Set and communicate daily mobility goal to care team and patient/family/caregiver  - Collaborate with rehabilitation services on mobility goals if consulted  - Perform Range of Motion times a day  - Reposition patient every hours    - Dangle patient times a day  - Stand patient times a day  - Ambulate patient times a day  - Out of bed to chair times a day   - Out of bed for meals times a day  - Out of bed for toileting  - Record patient progress and toleration of activity level   Outcome: Progressing     Problem: Knowledge Deficit  Goal: Patient/family/caregiver demonstrates understanding of disease process, treatment plan, medications, and discharge instructions  Description: Complete learning assessment and assess knowledge base   Interventions:  - Provide teaching at level of understanding  - Provide teaching via preferred learning methods  Outcome: Progressing     Problem: DISCHARGE PLANNING  Goal: Discharge to home or other facility with appropriate resources  Description: INTERVENTIONS:  - Identify barriers to discharge w/patient and caregiver  - Arrange for needed discharge resources and transportation as appropriate  - Identify discharge learning needs (meds, wound care, etc )  - Arrange for interpretive services to assist at discharge as needed  - Refer to Case Management Department for coordinating discharge planning if the patient needs post-hospital services based on physician/advanced practitioner order or complex needs related to functional status, cognitive ability, or social support system  Outcome: Progressing     Problem: BIRTH - VAGINAL/ SECTION  Goal: Fetal and maternal status remain reassuring during the birth process  Description: INTERVENTIONS:  - Monitor vital signs  - Monitor fetal heart rate  - Monitor uterine activity  - Monitor labor progression (vaginal delivery)  - DVT prophylaxis  - Antibiotic prophylaxis  Outcome: Progressing  Goal: Emotionally satisfying birthing experience for mother/fetus  Description: Interventions:  - Assess, plan, implement and evaluate the nursing care given to the patient in labor  - Advocate the philosophy that each childbirth experience is a unique experience and support the family's chosen level of involvement and control during the labor process   - Actively participate in both the patient's and family's teaching of the birth process  - Consider cultural, Christianity and age-specific factors and plan care for the patient in labor  Outcome: Progressing

## 2022-08-23 NOTE — DISCHARGE SUMMARY
Discharge Summary - OB/GYN  Martinez Dillard 24 y o  female MRN: [id number]  Unit/Bed#: -01 Encounter: [id number]    Admission Date: 2022     Discharge Date: ***    Admitting Attending: Yulissa Cerda MD    Delivering Attending: Dr Es Whipple    Discharging Attending: ***    Principal Diagnosis: Pregnancy at 39w2d    Secondary Diagnosis:   1  Obesity    Procedures: spontaneous vaginal delivery; left labial laceration and 1st degree vaginal laceration repairs    Anesthesia: epidural    Hospital course:  Martinez Dillard is a 24 y o  Miya Rase at 44w2d who was initially admitted for elective induction of labor  Her pregnancy was uncomplicated  She was induced with Cytotec, winters balloon, and Pitocin  She received an epidural for maternal analgesia  Amniotomy was performed for clear fluid  She progressed to full dilation and began to push  She delivered a viable female  on 2022 at 36  Weight ***lbs ***oz via normal spontaneous vaginal delivery  She sustained a left labial laceration and a 1st degree vaginal laceration during delivery which were adequately repaired  Apgars were 9 (1 min) and 9 (5 min)   was transferred to  nursery  Patient tolerated the procedure well  Her post-delivery course was complicated by ***  Her postpartum pain was well controlled with ***oral analgesics  On day of discharge, she was ambulating and able to reasonably perform all ADLs  She was voiding and had appropriate bowel function  Pain was well controlled  She was discharged home on postpartum day #*** without complications  Patient was instructed to follow up with her OB as an outpatient and was given appropriate warnings to call provider if she develops signs of infection or uncontrolled pain  Complications: none apparent    Condition at discharge: good     Discharge instructions/Information to patient and family:   See after visit summary for information provided to patient and family  Provisions for Follow-Up Care:  See after visit summary for information related to follow-up care and any pertinent home health orders  Disposition: See After Visit Summary for discharge disposition information  Planned Readmission: No    Discharge medications and instructions:   Please see AVS for full list of medications upon discharge        ***

## 2022-08-23 NOTE — L&D DELIVERY NOTE
DELIVERY NOTE  Shelly Silva 24 y o  female MRN: [id number]  Unit/Bed#: -01 Encounter: [id number]    Obstetrician:    Dr Lele Donis  Assistant:   Dr Chey Palmer MD    Pre-Delivery Diagnosis:   Patient Active Problem List   Diagnosis    Head injury    Intractable acute post-traumatic headache    Musculoskeletal pain of right upper extremity    Trapezius muscle spasm    Smoking     (spontaneous vaginal delivery)         Post-Delivery Diagnosis:   Same as above - Delivered      Procedure:  Spontaneous vaginal delivery      Anesthesia:  epidural    Specimens:   Cord blood obtained   Placenta; normal appearing, central insertion, intact   Arterial and venous blood gases (below)     Gases:  Umbilical Cord Venous Blood Gas:  Results from last 7 days   Lab Units 22  1139   PH COV  7 359   PCO2 COV mm HG 38 0   HCO3 COV mmol/L 20 9   BASE EXC COV mmol/L -4 0*   O2 CT CD VB mL/dL 15 9   O2 HGB, VENOUS CORD % 63 1     Umbilical Cord Arterial Blood Gas:  Results from last 7 days   Lab Units 22  1139   PH COA  7 317   PCO2 COA  40 7   PO2 COA mm HG 19 8   HCO3 COA mmol/L 20 4   BASE EXC COA mmol/L -5 4*   O2 CONTENT CORD ART ml/dl 8 7   O2 HGB, ARTERIAL CORD % 44 5       Quantitative Blood Loss:   398 mL           Complications:    None    Brief Description of Labor Course:  Shelly Silva is a 24 y o   female at 39w2d who was admitted to L&D for eIOL  She was induced with Cytotec, winters balloon, and Pitocin  She received an epidural for maternal analgesia  Amniotomy was performed for clear fluid  She progressed to complete at 1120, pushed for 9 min, and delivered a healthy  at 36  Description of Delivery:   With  the assistance of maternal expulsive forces, the fetal vertex delivered spontaneously  A nuchal cord was  noted and delivered through  The anterior left shoulder was delivered atraumatically with gentle downward traction   The contralateral arm was delivered with gentle upward traction  The remainder of the fetus delivered spontaneously at 36, resulting in a viable female   Upon delivery, the infant was placed on the mothers abdomen and the cord was doubly clamped and cut after 30 seconds  The  was noted to have good tone and cry spontaneously  There was no evidence of injury  The  was passed off to  staff for evaluation  Umbilical cord blood and umbilical artery and venous gases were collected and sent to the lab  An intact placenta was delivered spontaneously at  using fundal massage and gentle cord traction and was noted to have a centrally-inserted 3-vessel cord  Active management of the third stage of labor was undertaken with IV pitocin at 250milliunits/min  Inspection of the perineum, vagina, labia, cervix, and urethra revealed a 1st degree and left labial laceration  Bleeding was noted to be under control  A bimanual exam was performed   Outcome:  Living  with APGARS 9 (1 min) and 9 (5 min)   weight: pending    Perineal Inspection/Laceration Repair  Inspection of the perineum, vagina, labia, cervix, and urethra revealed a 1st degree and left labial laceration, which was repaired in standard fashion with 3-0 Vicryl rapide  Patient was comfortable with epidural at that time  The laceration showed good tissue reapproximation and hemostasis  At the conclusion of the delivery, all needle, sponge, and instrument counts were noted to be correct  Patient tolerated the procedure well and was allowed to recover in labor and delivery room with family and  before being transferred to the post-partum floor  Conclusion:  Mother and baby are currently recovering nicely in stable condition  Attending Supervision:   Dr Osmin Crain was present for the entire procedure      Malcom Downey MD MD  OB/GYN PGY-1   2022 12:18 PM

## 2022-08-23 NOTE — OB LABOR/OXYTOCIN SAFETY PROGRESS
Oxytocin Safety Progress Check Note - Socorro Wen 24 y o  female MRN: [id number]    Unit/Bed#: -01 Encounter: [id number]    Dose (fanta-units/min) Oxytocin: 6 fanta-units/min  Contraction Frequency (minutes): 1-3  Contraction Quality: Mild  Tachysystole: No   Cervical Dilation: 6        Cervical Effacement: 70  Fetal Station: -1  Baseline Rate: 125 bpm  Fetal Heart Rate: 130 BPM  FHR Category: Category I               Vital Signs:   Vitals:    08/23/22 0526   BP: 135/73   Pulse: 63   Resp:    Temp:    SpO2:        Notes/comments: SVE 6/70/-1, FHT Cat I, toco irregular  Discussed with attending          Veena Harris MD 8/23/2022 5:38 AM

## 2022-08-23 NOTE — PLAN OF CARE
Problem: PAIN - ADULT  Goal: Verbalizes/displays adequate comfort level or baseline comfort level  Description: Interventions:  - Encourage patient to monitor pain and request assistance  - Assess pain using appropriate pain scale  - Administer analgesics based on type and severity of pain and evaluate response  - Implement non-pharmacological measures as appropriate and evaluate response  - Consider cultural and social influences on pain and pain management  - Notify physician/advanced practitioner if interventions unsuccessful or patient reports new pain  Outcome: Progressing     Problem: INFECTION - ADULT  Goal: Absence or prevention of progression during hospitalization  Description: INTERVENTIONS:  - Assess and monitor for signs and symptoms of infection  - Monitor lab/diagnostic results  - Monitor all insertion sites, i e  indwelling lines, tubes, and drains  - Monitor endotracheal if appropriate and nasal secretions for changes in amount and color  - [place] appropriate cooling/warming therapies per order  - Administer medications as ordered  - Instruct and encourage patient and family to use good hand hygiene technique  - Identify and instruct in appropriate isolation precautions for identified infection/condition  Outcome: Progressing  Goal: Absence of fever/infection during neutropenic period  Description: INTERVENTIONS:  - Monitor WBC    Outcome: Progressing     Problem: SAFETY ADULT  Goal: Patient will remain free of falls  Description: INTERVENTIONS:  - Educate patient/family on patient safety including physical limitations  - Instruct patient to call for assistance with activity   - Consult OT/PT to assist with strengthening/mobility   - Keep Call bell within reach  - Keep bed low and locked with side rails adjusted as appropriate  - Keep care items and personal belongings within reach  - Initiate and maintain comfort rounds  - Make Fall Risk Sign visible to staff  - Offer Toileting every hours, in advance of need  - Initiate/Maintain alarm  - Obtain necessary fall risk management equipment:   - Apply yellow socks and bracelet for high fall risk patients  - Consider moving patient to room near nurses station  Outcome: Progressing  Goal: Maintain or return to baseline ADL function  Description: INTERVENTIONS:  -  Assess patient's ability to carry out ADLs; assess patient's baseline for ADL function and identify physical deficits which impact ability to perform ADLs (bathing, care of mouth/teeth, toileting, grooming, dressing, etc )  - Assess/evaluate cause of self-care deficits   - Assess range of motion  - Assess patient's mobility; develop plan if impaired  - Assess patient's need for assistive devices and provide as appropriate  - Encourage maximum independence but intervene and supervise when necessary  - Involve family in performance of ADLs  - Assess for home care needs following discharge   - Consider OT consult to assist with ADL evaluation and planning for discharge  - Provide patient education as appropriate  Outcome: Progressing  Goal: Maintains/Returns to pre admission functional level  Description: INTERVENTIONS:  - Perform BMAT or MOVE assessment daily    - Set and communicate daily mobility goal to care team and patient/family/caregiver  - Collaborate with rehabilitation services on mobility goals if consulted  - Perform Range of Motion times a day  - Reposition patient every hours    - Dangle patient times a day  - Stand patient times a day  - Ambulate patient times a day  - Out of bed to chair times a day   - Out of bed for meals times a day  - Out of bed for toileting  - Record patient progress and toleration of activity level   Outcome: Progressing     Problem: Knowledge Deficit  Goal: Patient/family/caregiver demonstrates understanding of disease process, treatment plan, medications, and discharge instructions  Description: Complete learning assessment and assess knowledge base   Interventions:  - Provide teaching at level of understanding  - Provide teaching via preferred learning methods  Outcome: Progressing     Problem: DISCHARGE PLANNING  Goal: Discharge to home or other facility with appropriate resources  Description: INTERVENTIONS:  - Identify barriers to discharge w/patient and caregiver  - Arrange for needed discharge resources and transportation as appropriate  - Identify discharge learning needs (meds, wound care, etc )  - Arrange for interpretive services to assist at discharge as needed  - Refer to Case Management Department for coordinating discharge planning if the patient needs post-hospital services based on physician/advanced practitioner order or complex needs related to functional status, cognitive ability, or social support system  Outcome: Progressing     Problem: POSTPARTUM  Goal: Experiences normal postpartum course  Description: INTERVENTIONS:  - Monitor maternal vital signs  - Assess uterine involution and lochia  Outcome: Progressing  Goal: Appropriate maternal -  bonding  Description: INTERVENTIONS:  - Identify family support  - Assess for appropriate maternal/infant bonding   -Encourage maternal/infant bonding opportunities  - Referral to  or  as needed  Outcome: Progressing  Goal: Establishment of infant feeding pattern  Description: INTERVENTIONS:  - Assess breast/bottle feeding  - Refer to lactation as needed  Outcome: Progressing  Goal: Incision(s), wounds(s) or drain site(s) healing without S/S of infection  Description: INTERVENTIONS  - Assess and document dressing, incision, wound bed, drain sites and surrounding tissue  - Provide patient and family education  - Perform skin care/dressing changes every   Outcome: Progressing

## 2022-08-23 NOTE — ANESTHESIA PROCEDURE NOTES
Epidural Block    Patient location during procedure: OB  Reason for block: procedure for pain and at surgeon's request  Staffing  Performed: CRNA and Anesthesiologist   Anesthesiologist: Lennice Koyanagi, MD  Resident/CRNA: Doug French CRNA  Preanesthetic Checklist  Completed: patient identified, IV checked, site marked, risks and benefits discussed, surgical consent, monitors and equipment checked, pre-op evaluation and timeout performed  Epidural  Patient position: sitting  Prep: ChloraPrep  Patient monitoring: cardiac monitor and frequent blood pressure checks  Approach: midline  Location: lumbar  Injection technique: JOSE air  Needle  Needle type: Tuohy   Needle gauge: 18 G  Catheter type: side hole  Catheter size: 20 G  Catheter at skin depth: 14 cm  Catheter securement method: stabilization device  Test dose: negative  Assessment  Number of attempts: 2negative aspiration for CSF, negative aspiration for heme and no paresthesia on injection  patient tolerated the procedure well with no immediate complications

## 2022-08-23 NOTE — ANESTHESIA POSTPROCEDURE EVALUATION
Post-Op Assessment Note    CV Status:  Stable  Pain Score: 0    Pain management: adequate     Mental Status:  Alert and awake   Hydration Status:  Euvolemic   PONV Controlled:  Controlled   Airway Patency:  Patent      Post Op Vitals Reviewed: Yes      Staff: CRNA     Post-op block assessment: catheter intact and no complications      No complications documented      BP   136/74   Temp      Pulse  76   Resp      SpO2

## 2022-08-23 NOTE — OB LABOR/OXYTOCIN SAFETY PROGRESS
Labor Progress Note - Socorro Meyersrbshannon 24 y o  female MRN: [id number]    Unit/Bed#: -01 Encounter: [id number]       Contraction Frequency (minutes): irritability; irregular  Contraction Quality: Not applicable  Tachysystole: No   Cervical Dilation: 4        Cervical Effacement: 50  Fetal Station: -2  Baseline Rate: 130 bpm  Fetal Heart Rate: 150 BPM  FHR Category: Category I               Vital Signs:   Vitals:    08/22/22 2308   BP: 133/66   Pulse: 74   Resp: 18   Temp: 98 °F (36 7 °C)   SpO2:        Notes/comments: SVE 4/50/-2, FHT Cat I, toco irregular contractions  Feeling contractions at a 6/10  Still thinking about epidural  Discussed with attending          Kaela Brooks MD 8/22/2022 11:36 PM

## 2022-08-24 PROBLEM — R03.0 ELEVATED BLOOD-PRESSURE READING WITHOUT DIAGNOSIS OF HYPERTENSION: Status: ACTIVE | Noted: 2022-08-24

## 2022-08-24 PROCEDURE — 99024 POSTOP FOLLOW-UP VISIT: CPT | Performed by: OBSTETRICS & GYNECOLOGY

## 2022-08-24 RX ADMIN — IBUPROFEN 600 MG: 600 TABLET ORAL at 17:57

## 2022-08-24 RX ADMIN — DOCUSATE SODIUM 100 MG: 100 CAPSULE, LIQUID FILLED ORAL at 17:57

## 2022-08-24 RX ADMIN — IBUPROFEN 600 MG: 600 TABLET ORAL at 05:34

## 2022-08-24 RX ADMIN — IBUPROFEN 600 MG: 600 TABLET ORAL at 11:30

## 2022-08-24 RX ADMIN — DOCUSATE SODIUM 100 MG: 100 CAPSULE, LIQUID FILLED ORAL at 09:51

## 2022-08-24 NOTE — LACTATION NOTE
This note was copied from a baby's chart  CONSULT - LACTATION  Baby Girl (Socorro) Bettye 1 days female MRN: [id number]    [address] Room / Bed:  305(N)/ 305(N) Encounter: [id number]    Maternal Information     MOTHER:  Socorro Wen  Maternal Age: 24 y o    OB History: # 1 - Date: 22, Sex: Female, Weight: 3155 g (6 lb 15 3 oz), GA: 39w2d, Delivery: Vaginal, Spontaneous, Apgar1: 9, Apgar5: 9, Living: Living, Birth Comments: None   Previouse breast reduction surgery? No    Lactation history:   Has patient previously breast fed: No   How long had patient previously breast fed:     Previous breast feeding complications:       Past Surgical History:   Procedure Laterality Date    WISDOM TOOTH EXTRACTION          Birth information:  YOB: 2023   Time of birth: 11:30 AM   Sex: female   Delivery type: Vaginal, Spontaneous   Birth Weight: 3155 g (6 lb 15 3 oz)   Percent of Weight Change: -1%     Gestational Age: 44w2d   [unfilled]    Assessment     Breast and nipple assessment: asymmetric breast    [place] Assessment: normal assessment    Feeding assessment: feeding well  LATCH:  Latch: Grasps breast, tongue down, lips flanged, rhythmic sucking   Audible Swallowing: Spontaneous and intermittent (24 hours old)   Type of Nipple: Everted (After stimulation)   Comfort (Breast/Nipple): Soft/non-tender   Hold (Positioning): Partial assist, teach one side, mother does other, staff holds   LATCH Score: 9          Feeding recommendations:  breast feed on demand     Met with mother  Provided mother with Ready, Set, Baby booklet  Discussed Skin to Skin contact an benefits to mom and baby  Talked about the delay of the first bath until baby has adjusted  Spoke about the benefits of rooming in  Feeding on cue and what that means for recognizing infant's hunger  Avoidance of pacifiers for the first month discussed   Talked about exclusive breastfeeding for the first 6 months  Positioning and latch reviewed as well as showing images of other feeding positions  Discussed the properties of a good latch in any position  Reviewed hand/manual expression  Discussed s/s that baby is getting enough milk and some s/s that breastfeeding dyad may need further help  Gave information on common concerns, what to expect the first few weeks after delivery, preparing for other caregivers, and how partners can help  Resources for support also provided  Information on hand expression given  Discussed benefits of knowing how to manually express breast including stimulating milk supply, softening nipple for latch and evacuating breast in the event of engorgement  Discussed 2nd night syndrome and ways to calm infant  Hand out given  Provided DC booklet at this time, enc family to review and prepare questions for day of DC  Assisted parents to place baby skin to skin in cross cradle hold  Discussed importance of alignment of baby's ear, shoulder, and hip in any preferred position  Worked on supporting baby at breast level and beginning the feed with baby's nose arriving at the nipple  Then, using areolar compression while guiding baby chin-forward to the breast to achieve a deep, comfortable latch  Baby latches well at this time, support from [facility] to guide baby on the breast for a deep latch  Burping demonstrated, baby burps and begins to cue again, assisted to pos and latch in fb hold  Swallows noted throughout the feeding  Reviewed signs of effective breastfeeding: audible swallows, strong but comfortable tugging while latched, breasts softening (after milk comes in), baby falling asleep and releasing the breast, and meeting daily diaper goals  Mom has a medela pump at bedside       Georgina Alvarado RN 8/24/2022 1:46 PM

## 2022-08-24 NOTE — UTILIZATION REVIEW
Inpatient Admission Authorization Request   Notification of Maternity/Delivery &  Birth Information for Admission   SERVICING FACILITY:   [address]  Tax ID: [id number]  NPI: [id number]  Place of Service: Inpatient [address] [facility]  [address]  Place of Service Code: 24     ATTENDING PROVIDER:  Attending Name and NPI#: [facility] Nir Wilkes [[id number]]  Address: Lui Hardy  [address]  Phone: [phone]     UTILIZATION REVIEW CONTACT:  Sarah Burt, Utilization Review Supervisor  Network Utilization Review Department  Phone: [phone]  Fax [phone]  Email: [email or web address]     PHYSICIAN ADVISORY SERVICES:  FOR QRUZ-YL-MAZW REVIEW - MEDICAL NECESSITY DENIAL  Phone: [phone]  Fax: [phone]  Email: [email or web address]     TYPE OF REQUEST:  Inpatient Status     ADMISSION INFORMATION:  ADMISSION DATE/TIME: 22  3:23 PM  PATIENT DIAGNOSIS CODE/DESCRIPTION:  Encounter for full-term uncomplicated delivery [G98] The encounter diagnosis was 37 weeks gestation of pregnancy  1  37 weeks gestation of pregnancy      DISCHARGE DATE/TIME: No discharge date for patient encounter  MOTHER AND  INFORMATION:  Mother: Maria Antonia Quach 1999   Delivering clinician:    OB History        1    Para   1    Term   1       0    AB   0    Living   1       SAB   0    IAB   0    Ectopic   0    Multiple   0    Live Births   1           Obstetric Comments   Menarche 14           [place] Name & MRN:   Information for the patient's :  Elaina Luna Girl Juanita Kathleen) [[id number]]     [place] Delivery Information:  Sex: female  Delivered 2022 11:30 AM by Vaginal, Spontaneous; Gestational Age: 44w2d     Measurements:  Weight: 6 lb 15 3 oz (3155 g);   Height: 18"    APGAR 1 minute 5 minutes 10 minutes   Totals: 9 9      [place] Birth Information: 24 y o  female MRN: [id number] Unit/Bed#: -01 Estimated Date of Delivery: 8/28/22  Birthweight: No birth weight on file  Gestational Age: <None> Delivery Type: Vaginal, Spontaneous    IMPORTANT INFORMATION:  Please contact Chelsea Haerd directly with any questions or concerns regarding this request  Department voicemails are confidential     Send requests for admission clinical reviews, concurrent reviews, approvals, and administrative denials due to lack of clinical to fax [phone]

## 2022-08-24 NOTE — ASSESSMENT & PLAN NOTE
Systolic (38MJI), ZRT:670 , Min:112 , QEU:985      Diastolic (50ACW), NGS:85, Min:55, Max:79    Denies HA, SOB, Chest pain, swelling

## 2022-08-24 NOTE — PROGRESS NOTES
Progress Note - OB/GYN  Aruna Mendoza 24 y o  female MRN: [id number]  Unit/Bed#: -01 Encounter: [id number]    Assessment and Plan     Aruna Mendoza is a patient of: [facility]  She is PPD# 1 s/p  spontaneous vaginal delivery  Recovering well and is stable       Elevated blood-pressure reading without diagnosis of hypertension  Assessment & Plan  Systolic (73GMY), HNP:895 , Min:105 , EZX:685      Diastolic (89LDB), TKP:02, Min:55, Max:103    Denies HA, SOB, Chest pain, swelling    *  (spontaneous vaginal delivery)  Assessment & Plan  Cephalic presentation confirmed by ultrasound  Clinical EFW by [facility]: 7-8lbs  GBS negative status  Plan for ripening with Cytotec and Dykes  Analgesia and/or epidural at patient request  Follow up CBC, RPR, blood type & antibody screen        Disposition    - Anticipate discharge home on PPD# 1 vs 2       Subjective/Objective     Chief Complaint: Postpartum State     Subjective:    Aruna Mendoza is PPD#1 s/p  spontaneous vaginal delivery  She has no current complaints  Pain is well controlled  Patient is currently voiding  She is ambulating  Patient is currently passing flatus and has had no bowel movement  She is tolerating PO, and denies nausea or vomitting  Patient denies fever, chills, chest pain, shortness of breath, or calf tenderness  Lochia is normal  She is  Breastfeeding  She is recovering well and is stable         Vitals:   /77 (BP Location: Right arm)   Pulse 82   Temp 98 °F (36 7 °C) (Oral)   Resp 18   Ht 5' 4" (1 626 m)   Wt 94 3 kg (208 lb)   LMP 2021 (Exact Date)   SpO2 96%   Breastfeeding Yes   BMI 35 70 kg/m²       Intake/Output Summary (Last 24 hours) at 2022 5028  Last data filed at 2022 1808  Gross per 24 hour   Intake --   Output 1398 ml   Net -1398 ml       Invasive Devices  Timeline    Peripheral Intravenous Line  Duration           Peripheral IV 22 Distal;Left;Ventral (anterior) Forearm 1 day Physical Exam:   GEN: Latrice Hernandez appears well, alert and oriented x 3, pleasant and cooperative   CARDIO: RRR, no murmurs or rubs  RESP:  CTAB, no wheezes or rales  ABDOMEN: soft, no tenderness, no distention, fundus @ U-3  EXTREMITIES:, non tender, no erythema  Labs:     Hemoglobin   Date Value Ref Range Status   08/22/2022 11 9 11 5 - 15 4 g/dL Final   06/08/2022 12 4 11 5 - 15 4 g/dL Final     WBC   Date Value Ref Range Status   08/22/2022 10 64 (H) 4 31 - 10 16 Thousand/uL Final   06/08/2022 9 03 4 31 - 10 16 Thousand/uL Final     Platelets   Date Value Ref Range Status   08/22/2022 213 149 - 390 Thousands/uL Final   06/08/2022 229 149 - 390 Thousands/uL Final     Creatinine   Date Value Ref Range Status   09/23/2019 0 82 0 60 - 1 30 mg/dL Final     Comment:     Standardized to IDMS reference Gali Ly MD  8/24/2022  6:07 AM

## 2022-08-24 NOTE — PLAN OF CARE
Problem: PAIN - ADULT  Goal: Verbalizes/displays adequate comfort level or baseline comfort level  Description: Interventions:  - Encourage patient to monitor pain and request assistance  - Assess pain using appropriate pain scale  - Administer analgesics based on type and severity of pain and evaluate response  - Implement non-pharmacological measures as appropriate and evaluate response  - Consider cultural and social influences on pain and pain management  - Notify physician/advanced practitioner if interventions unsuccessful or patient reports new pain  Outcome: Progressing     Problem: INFECTION - ADULT  Goal: Absence or prevention of progression during hospitalization  Description: INTERVENTIONS:  - Assess and monitor for signs and symptoms of infection  - Monitor lab/diagnostic results  - Monitor all insertion sites, i e  indwelling lines, tubes, and drains  - Monitor endotracheal if appropriate and nasal secretions for changes in amount and color  - [place] appropriate cooling/warming therapies per order  - Administer medications as ordered  - Instruct and encourage patient and family to use good hand hygiene technique  - Identify and instruct in appropriate isolation precautions for identified infection/condition  Outcome: Progressing  Goal: Absence of fever/infection during neutropenic period  Description: INTERVENTIONS:  - Monitor WBC    Outcome: Progressing     Problem: SAFETY ADULT  Goal: Patient will remain free of falls  Description: INTERVENTIONS:  - Educate patient/family on patient safety including physical limitations  - Instruct patient to call for assistance with activity   - Consult OT/PT to assist with strengthening/mobility   - Keep Call bell within reach  - Keep bed low and locked with side rails adjusted as appropriate  - Keep care items and personal belongings within reach  - Initiate and maintain comfort rounds  - Make Fall Risk Sign visible to staff  - Offer Toileting every hours, in advance of need  - Initiate/Maintain alarm  - Obtain necessary fall risk management equipment:   - Apply yellow socks and bracelet for high fall risk patients  - Consider moving patient to room near nurses station  Outcome: Progressing  Goal: Maintain or return to baseline ADL function  Description: INTERVENTIONS:  -  Assess patient's ability to carry out ADLs; assess patient's baseline for ADL function and identify physical deficits which impact ability to perform ADLs (bathing, care of mouth/teeth, toileting, grooming, dressing, etc )  - Assess/evaluate cause of self-care deficits   - Assess range of motion  - Assess patient's mobility; develop plan if impaired  - Assess patient's need for assistive devices and provide as appropriate  - Encourage maximum independence but intervene and supervise when necessary  - Involve family in performance of ADLs  - Assess for home care needs following discharge   - Consider OT consult to assist with ADL evaluation and planning for discharge  - Provide patient education as appropriate  Outcome: Progressing  Goal: Maintains/Returns to pre admission functional level  Description: INTERVENTIONS:  - Perform BMAT or MOVE assessment daily    - Set and communicate daily mobility goal to care team and patient/family/caregiver  - Collaborate with rehabilitation services on mobility goals if consulted  - Perform Range of Motion times a day  - Reposition patient every hours    - Dangle patient times a day  - Stand patient times a day  - Ambulate patient times a day  - Out of bed to chair times a day   - Out of bed for meals times a day  - Out of bed for toileting  - Record patient progress and toleration of activity level   Outcome: Progressing     Problem: Knowledge Deficit  Goal: Patient/family/caregiver demonstrates understanding of disease process, treatment plan, medications, and discharge instructions  Description: Complete learning assessment and assess knowledge base   Interventions:  - Provide teaching at level of understanding  - Provide teaching via preferred learning methods  Outcome: Progressing     Problem: DISCHARGE PLANNING  Goal: Discharge to home or other facility with appropriate resources  Description: INTERVENTIONS:  - Identify barriers to discharge w/patient and caregiver  - Arrange for needed discharge resources and transportation as appropriate  - Identify discharge learning needs (meds, wound care, etc )  - Arrange for interpretive services to assist at discharge as needed  - Refer to Case Management Department for coordinating discharge planning if the patient needs post-hospital services based on physician/advanced practitioner order or complex needs related to functional status, cognitive ability, or social support system  Outcome: Progressing     Problem: POSTPARTUM  Goal: Experiences normal postpartum course  Description: INTERVENTIONS:  - Monitor maternal vital signs  - Assess uterine involution and lochia  Outcome: Progressing  Goal: Appropriate maternal -  bonding  Description: INTERVENTIONS:  - Identify family support  - Assess for appropriate maternal/infant bonding   -Encourage maternal/infant bonding opportunities  - Referral to  or  as needed  Outcome: Progressing  Goal: Establishment of infant feeding pattern  Description: INTERVENTIONS:  - Assess breast/bottle feeding  - Refer to lactation as needed  Outcome: Progressing  Goal: Incision(s), wounds(s) or drain site(s) healing without S/S of infection  Description: INTERVENTIONS  - Assess and document dressing, incision, wound bed, drain sites and surrounding tissue  - Provide patient and family education  - Perform skin care/dressing changes every   Outcome: Progressing

## 2022-08-24 NOTE — PLAN OF CARE
Problem: PAIN - ADULT  Goal: Verbalizes/displays adequate comfort level or baseline comfort level  Description: Interventions:  - Encourage patient to monitor pain and request assistance  - Assess pain using appropriate pain scale  - Administer analgesics based on type and severity of pain and evaluate response  - Implement non-pharmacological measures as appropriate and evaluate response  - Consider cultural and social influences on pain and pain management  - Notify physician/advanced practitioner if interventions unsuccessful or patient reports new pain  Outcome: Progressing     Problem: INFECTION - ADULT  Goal: Absence or prevention of progression during hospitalization  Description: INTERVENTIONS:  - Assess and monitor for signs and symptoms of infection  - Monitor lab/diagnostic results  - Monitor all insertion sites, i e  indwelling lines, tubes, and drains  - Monitor endotracheal if appropriate and nasal secretions for changes in amount and color  - [place] appropriate cooling/warming therapies per order  - Administer medications as ordered  - Instruct and encourage patient and family to use good hand hygiene technique  - Identify and instruct in appropriate isolation precautions for identified infection/condition  Outcome: Progressing  Goal: Absence of fever/infection during neutropenic period  Description: INTERVENTIONS:  - Monitor WBC    Outcome: Progressing     Problem: SAFETY ADULT  Goal: Patient will remain free of falls  Description: INTERVENTIONS:  - Educate patient/family on patient safety including physical limitations  - Instruct patient to call for assistance with activity   - Consult OT/PT to assist with strengthening/mobility   - Keep Call bell within reach  - Keep bed low and locked with side rails adjusted as appropriate  - Keep care items and personal belongings within reach  - Initiate and maintain comfort rounds  - Make Fall Risk Sign visible to staff  - Offer Toileting every hours, in advance of need  - Initiate/Maintain alarm  - Obtain necessary fall risk management equipment:   - Apply yellow socks and bracelet for high fall risk patients  - Consider moving patient to room near nurses station  Outcome: Progressing  Goal: Maintain or return to baseline ADL function  Description: INTERVENTIONS:  -  Assess patient's ability to carry out ADLs; assess patient's baseline for ADL function and identify physical deficits which impact ability to perform ADLs (bathing, care of mouth/teeth, toileting, grooming, dressing, etc )  - Assess/evaluate cause of self-care deficits   - Assess range of motion  - Assess patient's mobility; develop plan if impaired  - Assess patient's need for assistive devices and provide as appropriate  - Encourage maximum independence but intervene and supervise when necessary  - Involve family in performance of ADLs  - Assess for home care needs following discharge   - Consider OT consult to assist with ADL evaluation and planning for discharge  - Provide patient education as appropriate  Outcome: Progressing  Goal: Maintains/Returns to pre admission functional level  Description: INTERVENTIONS:  - Perform BMAT or MOVE assessment daily    - Set and communicate daily mobility goal to care team and patient/family/caregiver  - Collaborate with rehabilitation services on mobility goals if consulted  - Perform Range of Motion times a day  - Reposition patient every hours    - Dangle patient times a day  - Stand patient times a day  - Ambulate patient times a day  - Out of bed to chair times a day   - Out of bed for meals times a day  - Out of bed for toileting  - Record patient progress and toleration of activity level   Outcome: Progressing     Problem: Knowledge Deficit  Goal: Patient/family/caregiver demonstrates understanding of disease process, treatment plan, medications, and discharge instructions  Description: Complete learning assessment and assess knowledge base   Interventions:  - Provide teaching at level of understanding  - Provide teaching via preferred learning methods  Outcome: Progressing     Problem: DISCHARGE PLANNING  Goal: Discharge to home or other facility with appropriate resources  Description: INTERVENTIONS:  - Identify barriers to discharge w/patient and caregiver  - Arrange for needed discharge resources and transportation as appropriate  - Identify discharge learning needs (meds, wound care, etc )  - Arrange for interpretive services to assist at discharge as needed  - Refer to Case Management Department for coordinating discharge planning if the patient needs post-hospital services based on physician/advanced practitioner order or complex needs related to functional status, cognitive ability, or social support system  Outcome: Progressing     Problem: POSTPARTUM  Goal: Experiences normal postpartum course  Description: INTERVENTIONS:  - Monitor maternal vital signs  - Assess uterine involution and lochia  Outcome: Progressing  Goal: Appropriate maternal -  bonding  Description: INTERVENTIONS:  - Identify family support  - Assess for appropriate maternal/infant bonding   -Encourage maternal/infant bonding opportunities  - Referral to  or  as needed  Outcome: Progressing  Goal: Establishment of infant feeding pattern  Description: INTERVENTIONS:  - Assess breast/bottle feeding  - Refer to lactation as needed  Outcome: Progressing  Goal: Incision(s), wounds(s) or drain site(s) healing without S/S of infection  Description: INTERVENTIONS  - Assess and document dressing, incision, wound bed, drain sites and surrounding tissue  - Provide patient and family education  - Perform skin care/dressing changes every   Outcome: Progressing

## 2022-08-25 VITALS
BODY MASS INDEX: 35.51 KG/M2 | RESPIRATION RATE: 18 BRPM | OXYGEN SATURATION: 96 % | HEART RATE: 58 BPM | TEMPERATURE: 98 F | DIASTOLIC BLOOD PRESSURE: 76 MMHG | SYSTOLIC BLOOD PRESSURE: 122 MMHG | HEIGHT: 64 IN | WEIGHT: 208 LBS

## 2022-08-25 PROCEDURE — 99024 POSTOP FOLLOW-UP VISIT: CPT | Performed by: OBSTETRICS & GYNECOLOGY

## 2022-08-25 RX ORDER — DIAPER,BRIEF,INFANT-TODD,DISP
1 EACH MISCELLANEOUS DAILY PRN
Qty: 30 G | Refills: 0 | Status: SHIPPED | OUTPATIENT
Start: 2022-08-25

## 2022-08-25 RX ORDER — IBUPROFEN 600 MG/1
600 TABLET ORAL EVERY 6 HOURS
Qty: 30 TABLET | Refills: 0
Start: 2022-08-25

## 2022-08-25 RX ORDER — DOCUSATE SODIUM 100 MG/1
100 CAPSULE, LIQUID FILLED ORAL 2 TIMES DAILY
Refills: 0
Start: 2022-08-25

## 2022-08-25 RX ADMIN — IBUPROFEN 600 MG: 600 TABLET ORAL at 00:16

## 2022-08-25 RX ADMIN — ACETAMINOPHEN 650 MG: 325 TABLET ORAL at 08:10

## 2022-08-25 NOTE — PROGRESS NOTES
Progress Note - OB/GYN  Shelly Silva 24 y o  female MRN: [id number]  Unit/Bed#: -01 Encounter: [id number]    Assessment and Plan     Shelly Silva is a patient of: [facility]  She is PPD# 2 s/p  spontaneous vaginal delivery  Recovering well and is stable       Elevated blood-pressure reading without diagnosis of hypertension  Assessment & Plan  Systolic (47ZXE), JCM:482 , Min:112 , OZV:526      Diastolic (95NJG), RWK:89, Min:55, Max:79    Denies HA, SOB, Chest pain, swelling    *  (spontaneous vaginal delivery)  Assessment & Plan  Recovering well   Encourage Ambulation  Encourage breastfeeding  GBS negative   Rh +           Disposition    - Anticipate discharge home on PPD# 2      Subjective/Objective     Chief Complaint: Postpartum State     Subjective:    Shelly Silva is PPD#2 s/p  spontaneous vaginal delivery  She has no current complaints  Pain is well controlled  Patient is currently voiding  She is ambulating  Patient is currently passing flatus and has had no bowel movement  She is tolerating PO, and denies nausea or vomitting  Patient denies fever, chills, chest pain, shortness of breath, or calf tenderness  Lochia is normal  She is  Breastfeeding  She is recovering well and is stable  She is ready for discharge         Vitals:   /79   Pulse 56   Temp 98 4 °F (36 9 °C) (Oral)   Resp 18   Ht 5' 4" (1 626 m)   Wt 94 3 kg (208 lb)   LMP 2021 (Exact Date)   SpO2 96%   Breastfeeding Yes   BMI 35 70 kg/m²     No intake or output data in the 24 hours ending 22 0739    Invasive Devices  Timeline    Peripheral Intravenous Line  Duration           Peripheral IV 22 Distal;Left;Ventral (anterior) Forearm 2 days                Physical Exam:   GEN: Shelly Silva appears well, alert and oriented x 3, pleasant and cooperative   CARDIO: RRR, no murmurs or rubs  RESP:  CTAB, no wheezes or rales  ABDOMEN: soft, no tenderness, no distention, fundus @ U-3  EXTREMITIES: , non tender, no erythema  Labs:     Hemoglobin   Date Value Ref Range Status   08/22/2022 11 9 11 5 - 15 4 g/dL Final   06/08/2022 12 4 11 5 - 15 4 g/dL Final     WBC   Date Value Ref Range Status   08/22/2022 10 64 (H) 4 31 - 10 16 Thousand/uL Final   06/08/2022 9 03 4 31 - 10 16 Thousand/uL Final     Platelets   Date Value Ref Range Status   08/22/2022 213 149 - 390 Thousands/uL Final   06/08/2022 229 149 - 390 Thousands/uL Final     Creatinine   Date Value Ref Range Status   09/23/2019 0 82 0 60 - 1 30 mg/dL Final     Comment:     Standardized to IDMS reference Fred Pennington MD  8/25/2022  7:39 AM

## 2022-08-25 NOTE — PLAN OF CARE
Problem: PAIN - ADULT  Goal: Verbalizes/displays adequate comfort level or baseline comfort level  Description: Interventions:  - Encourage patient to monitor pain and request assistance  - Assess pain using appropriate pain scale  - Administer analgesics based on type and severity of pain and evaluate response  - Implement non-pharmacological measures as appropriate and evaluate response  - Consider cultural and social influences on pain and pain management  - Notify physician/advanced practitioner if interventions unsuccessful or patient reports new pain  Outcome: Adequate for Discharge     Problem: INFECTION - ADULT  Goal: Absence or prevention of progression during hospitalization  Description: INTERVENTIONS:  - Assess and monitor for signs and symptoms of infection  - Monitor lab/diagnostic results  - Monitor all insertion sites, i e  indwelling lines, tubes, and drains  - Monitor endotracheal if appropriate and nasal secretions for changes in amount and color  - [place] appropriate cooling/warming therapies per order  - Administer medications as ordered  - Instruct and encourage patient and family to use good hand hygiene technique  - Identify and instruct in appropriate isolation precautions for identified infection/condition  Outcome: Adequate for Discharge  Goal: Absence of fever/infection during neutropenic period  Description: INTERVENTIONS:  - Monitor WBC    Outcome: Adequate for Discharge     Problem: SAFETY ADULT  Goal: Patient will remain free of falls  Description: INTERVENTIONS:  - Educate patient/family on patient safety including physical limitations  - Instruct patient to call for assistance with activity   - Consult OT/PT to assist with strengthening/mobility   - Keep Call bell within reach  - Keep bed low and locked with side rails adjusted as appropriate  - Keep care items and personal belongings within reach  - Initiate and maintain comfort rounds  - Make Fall Risk Sign visible to staff  - Offer Toileting every hours, in advance of need  - Initiate/Maintain alarm  - Obtain necessary fall risk management equipment:   - Apply yellow socks and bracelet for high fall risk patients  - Consider moving patient to room near nurses station  Outcome: Adequate for Discharge  Goal: Maintain or return to baseline ADL function  Description: INTERVENTIONS:  -  Assess patient's ability to carry out ADLs; assess patient's baseline for ADL function and identify physical deficits which impact ability to perform ADLs (bathing, care of mouth/teeth, toileting, grooming, dressing, etc )  - Assess/evaluate cause of self-care deficits   - Assess range of motion  - Assess patient's mobility; develop plan if impaired  - Assess patient's need for assistive devices and provide as appropriate  - Encourage maximum independence but intervene and supervise when necessary  - Involve family in performance of ADLs  - Assess for home care needs following discharge   - Consider OT consult to assist with ADL evaluation and planning for discharge  - Provide patient education as appropriate  Outcome: Adequate for Discharge  Goal: Maintains/Returns to pre admission functional level  Description: INTERVENTIONS:  - Perform BMAT or MOVE assessment daily    - Set and communicate daily mobility goal to care team and patient/family/caregiver  - Collaborate with rehabilitation services on mobility goals if consulted  - Perform Range of Motion times a day  - Reposition patient every hours    - Dangle patient times a day  - Stand patient times a day  - Ambulate patient times a day  - Out of bed to chair times a day   - Out of bed for meals times a day  - Out of bed for toileting  - Record patient progress and toleration of activity level   Outcome: Adequate for Discharge     Problem: Knowledge Deficit  Goal: Patient/family/caregiver demonstrates understanding of disease process, treatment plan, medications, and discharge instructions  Description: Complete learning assessment and assess knowledge base    Interventions:  - Provide teaching at level of understanding  - Provide teaching via preferred learning methods  Outcome: Adequate for Discharge     Problem: DISCHARGE PLANNING  Goal: Discharge to home or other facility with appropriate resources  Description: INTERVENTIONS:  - Identify barriers to discharge w/patient and caregiver  - Arrange for needed discharge resources and transportation as appropriate  - Identify discharge learning needs (meds, wound care, etc )  - Arrange for interpretive services to assist at discharge as needed  - Refer to Case Management Department for coordinating discharge planning if the patient needs post-hospital services based on physician/advanced practitioner order or complex needs related to functional status, cognitive ability, or social support system  Outcome: Adequate for Discharge     Problem: POSTPARTUM  Goal: Experiences normal postpartum course  Description: INTERVENTIONS:  - Monitor maternal vital signs  - Assess uterine involution and lochia  Outcome: Adequate for Discharge  Goal: Appropriate maternal -  bonding  Description: INTERVENTIONS:  - Identify family support  - Assess for appropriate maternal/infant bonding   -Encourage maternal/infant bonding opportunities  - Referral to  or  as needed  Outcome: Adequate for Discharge  Goal: Establishment of infant feeding pattern  Description: INTERVENTIONS:  - Assess breast/bottle feeding  - Refer to lactation as needed  Outcome: Adequate for Discharge  Goal: Incision(s), wounds(s) or drain site(s) healing without S/S of infection  Description: INTERVENTIONS  - Assess and document dressing, incision, wound bed, drain sites and surrounding tissue  - Provide patient and family education  Outcome: Adequate for Discharge

## 2022-08-25 NOTE — LACTATION NOTE
This note was copied from a baby's chart  CONSULT - LACTATION  Baby Girl ([place]) Bettye 2 days female MRN: [id number]    [address] Room / Bed:  305(N)/ 305(N) Encounter: [id number]    Maternal Information     MOTHER:  Socorro Wen  Maternal Age: 24 y o    OB History: # 1 - Date: [birth year], Sex: Female, Weight: 3155 g (6 lb 15 3 oz), GA: 39w2d, Delivery: Vaginal, Spontaneous, Apgar1: 9, Apgar5: 9, Living: Living, Birth Comments: None   Previouse breast reduction surgery? No    Lactation history:   Has patient previously breast fed: No   How long had patient previously breast fed:     Previous breast feeding complications:       Past Surgical History:   Procedure Laterality Date    WISDOM TOOTH EXTRACTION          Birth information:  YOB: 2023   Time of birth: 11:30 AM   Sex: female   Delivery type: Vaginal, Spontaneous   Birth Weight: 3155 g (6 lb 15 3 oz)   Percent of Weight Change: -7%     Gestational Age: 44w2d   [unfilled]    Assessment     Breast and nipple assessment: large breast     Assessment: normal assessment    Feeding assessment: feeding well  LATCH:  Latch: Grasps breast, tongue down, lips flanged, rhythmic sucking   Audible Swallowing: Spontaneous and intermittent (24 hours old)   Type of Nipple: Everted (After stimulation)   Comfort (Breast/Nipple): Soft/non-tender   Hold (Positioning): Partial assist, teach one side, mother does other, staff holds   LATCH Score: 9          Feeding recommendations:  breast feed on demand     Met with parents to assist with position and latch after mother called for assistance  Baby was sleepy at the breast upon entering, T-shirt was removed and diaper checked to help awaken baby  Baby was burped as well as was fussy and released a burp  Baby was brought to mother in football hold, but baby would not latch  Mother hand expressed and 4 drops of colostrum were given via finger feeding   Mother was assisted with a laid back position, using pillows to support  Baby was placed tummy down against her body and mother was encouraged to compress breast to allow baby to latch naturally  Baby latched deeply and mother reported comfort  Mother had no further questions/concerns at this time      Jessa Salazar RN 8/25/2022 12:48 PM

## 2022-08-25 NOTE — PLAN OF CARE
Problem: PAIN - ADULT  Goal: Verbalizes/displays adequate comfort level or baseline comfort level  Description: Interventions:  - Encourage patient to monitor pain and request assistance  - Assess pain using appropriate pain scale  - Administer analgesics based on type and severity of pain and evaluate response  - Implement non-pharmacological measures as appropriate and evaluate response  - Consider cultural and social influences on pain and pain management  - Notify physician/advanced practitioner if interventions unsuccessful or patient reports new pain  Outcome: Progressing     Problem: INFECTION - ADULT  Goal: Absence or prevention of progression during hospitalization  Description: INTERVENTIONS:  - Assess and monitor for signs and symptoms of infection  - Monitor lab/diagnostic results  - Monitor all insertion sites, i e  indwelling lines, tubes, and drains  - Monitor endotracheal if appropriate and nasal secretions for changes in amount and color  - [place] appropriate cooling/warming therapies per order  - Administer medications as ordered  - Instruct and encourage patient and family to use good hand hygiene technique  - Identify and instruct in appropriate isolation precautions for identified infection/condition  Outcome: Progressing  Goal: Absence of fever/infection during neutropenic period  Description: INTERVENTIONS:  - Monitor WBC    Outcome: Progressing     Problem: SAFETY ADULT  Goal: Patient will remain free of falls  Description: INTERVENTIONS:  - Educate patient/family on patient safety including physical limitations  - Instruct patient to call for assistance with activity   - Consult OT/PT to assist with strengthening/mobility   - Keep Call bell within reach  - Keep bed low and locked with side rails adjusted as appropriate  - Keep care items and personal belongings within reach  - Initiate and maintain comfort rounds  - Make Fall Risk Sign visible to staff  - Offer Toileting every hours, in advance of need  - Initiate/Maintain alarm  - Obtain necessary fall risk management equipment:   - Apply yellow socks and bracelet for high fall risk patients  - Consider moving patient to room near nurses station  Outcome: Progressing  Goal: Maintain or return to baseline ADL function  Description: INTERVENTIONS:  -  Assess patient's ability to carry out ADLs; assess patient's baseline for ADL function and identify physical deficits which impact ability to perform ADLs (bathing, care of mouth/teeth, toileting, grooming, dressing, etc )  - Assess/evaluate cause of self-care deficits   - Assess range of motion  - Assess patient's mobility; develop plan if impaired  - Assess patient's need for assistive devices and provide as appropriate  - Encourage maximum independence but intervene and supervise when necessary  - Involve family in performance of ADLs  - Assess for home care needs following discharge   - Consider OT consult to assist with ADL evaluation and planning for discharge  - Provide patient education as appropriate  Outcome: Progressing  Goal: Maintains/Returns to pre admission functional level  Description: INTERVENTIONS:  - Perform BMAT or MOVE assessment daily    - Set and communicate daily mobility goal to care team and patient/family/caregiver  - Collaborate with rehabilitation services on mobility goals if consulted  - Perform Range of Motion times a day  - Reposition patient every hours    - Dangle patient times a day  - Stand patient times a day  - Ambulate patient times a day  - Out of bed to chair times a day   - Out of bed for meals times a day  - Out of bed for toileting  - Record patient progress and toleration of activity level   Outcome: Progressing     Problem: Knowledge Deficit  Goal: Patient/family/caregiver demonstrates understanding of disease process, treatment plan, medications, and discharge instructions  Description: Complete learning assessment and assess knowledge base   Interventions:  - Provide teaching at level of understanding  - Provide teaching via preferred learning methods  Outcome: Progressing     Problem: DISCHARGE PLANNING  Goal: Discharge to home or other facility with appropriate resources  Description: INTERVENTIONS:  - Identify barriers to discharge w/patient and caregiver  - Arrange for needed discharge resources and transportation as appropriate  - Identify discharge learning needs (meds, wound care, etc )  - Arrange for interpretive services to assist at discharge as needed  - Refer to Case Management Department for coordinating discharge planning if the patient needs post-hospital services based on physician/advanced practitioner order or complex needs related to functional status, cognitive ability, or social support system  Outcome: Progressing     Problem: POSTPARTUM  Goal: Experiences normal postpartum course  Description: INTERVENTIONS:  - Monitor maternal vital signs  - Assess uterine involution and lochia  Outcome: Progressing  Goal: Appropriate maternal -  bonding  Description: INTERVENTIONS:  - Identify family support  - Assess for appropriate maternal/infant bonding   -Encourage maternal/infant bonding opportunities  - Referral to  or  as needed  Outcome: Progressing  Goal: Establishment of infant feeding pattern  Description: INTERVENTIONS:  - Assess breast/bottle feeding  - Refer to lactation as needed  Outcome: Progressing  Goal: Incision(s), wounds(s) or drain site(s) healing without S/S of infection  Description: INTERVENTIONS  - Assess and document dressing, incision, wound bed, drain sites and surrounding tissue  - Provide patient and family education  - Perform skin care/dressing changes every   Outcome: Progressing

## 2022-08-26 ENCOUNTER — NURSE TRIAGE (OUTPATIENT)
Dept: OTHER | Facility: OTHER | Age: 22
End: 2022-08-26

## 2022-08-27 ENCOUNTER — HOSPITAL ENCOUNTER (EMERGENCY)
Facility: HOSPITAL | Age: 22
Discharge: HOME | End: 2022-08-27
Attending: EMERGENCY MEDICINE
Payer: COMMERCIAL

## 2022-08-27 VITALS
TEMPERATURE: 98 F | OXYGEN SATURATION: 95 % | DIASTOLIC BLOOD PRESSURE: 80 MMHG | RESPIRATION RATE: 17 BRPM | SYSTOLIC BLOOD PRESSURE: 135 MMHG | HEART RATE: 76 BPM

## 2022-08-27 DIAGNOSIS — B96.89 BACTERIAL VAGINOSIS: ICD-10-CM

## 2022-08-27 DIAGNOSIS — N93.9 VAGINAL BLEEDING: Primary | ICD-10-CM

## 2022-08-27 DIAGNOSIS — N76.0 BACTERIAL VAGINOSIS: ICD-10-CM

## 2022-08-27 LAB
ALBUMIN SERPL BCP-MCNC: 2.6 G/DL (ref 3.5–5)
ALP SERPL-CCNC: 104 U/L (ref 46–116)
ALT SERPL W P-5'-P-CCNC: 42 U/L (ref 12–78)
ANION GAP SERPL CALCULATED.3IONS-SCNC: 10 MMOL/L (ref 4–13)
AST SERPL W P-5'-P-CCNC: 39 U/L (ref 5–45)
BACTERIA UR QL AUTO: ABNORMAL /HPF
BASOPHILS # BLD AUTO: 0.05 THOUSANDS/ΜL (ref 0–0.1)
BASOPHILS NFR BLD AUTO: 0 % (ref 0–1)
BILIRUB SERPL-MCNC: 0.23 MG/DL (ref 0.2–1)
BILIRUB UR QL STRIP: NEGATIVE
BUN SERPL-MCNC: 11 MG/DL (ref 5–25)
CALCIUM ALBUM COR SERPL-MCNC: 9.9 MG/DL (ref 8.3–10.1)
CALCIUM SERPL-MCNC: 8.8 MG/DL (ref 8.3–10.1)
CHLORIDE SERPL-SCNC: 104 MMOL/L (ref 96–108)
CLARITY UR: ABNORMAL
CO2 SERPL-SCNC: 25 MMOL/L (ref 21–32)
COLOR UR: YELLOW
CREAT SERPL-MCNC: 0.93 MG/DL (ref 0.6–1.3)
EOSINOPHIL # BLD AUTO: 0.23 THOUSAND/ΜL (ref 0–0.61)
EOSINOPHIL NFR BLD AUTO: 1 % (ref 0–6)
ERYTHROCYTE [DISTWIDTH] IN BLOOD BY AUTOMATED COUNT: 12.8 % (ref 11.6–15.1)
GFR SERPL CREATININE-BSD FRML MDRD: 87 ML/MIN/1.73SQ M
GLUCOSE SERPL-MCNC: 112 MG/DL (ref 65–140)
GLUCOSE UR STRIP-MCNC: NEGATIVE MG/DL
HCT VFR BLD AUTO: 35.7 % (ref 34.8–46.1)
HGB BLD-MCNC: 12 G/DL (ref 11.5–15.4)
HGB UR QL STRIP.AUTO: ABNORMAL
IMM GRANULOCYTES # BLD AUTO: 0.07 THOUSAND/UL (ref 0–0.2)
IMM GRANULOCYTES NFR BLD AUTO: 0 % (ref 0–2)
KETONES UR STRIP-MCNC: NEGATIVE MG/DL
LEUKOCYTE ESTERASE UR QL STRIP: ABNORMAL
LYMPHOCYTES # BLD AUTO: 1.52 THOUSANDS/ΜL (ref 0.6–4.47)
LYMPHOCYTES NFR BLD AUTO: 9 % (ref 14–44)
MCH RBC QN AUTO: 31.9 PG (ref 26.8–34.3)
MCHC RBC AUTO-ENTMCNC: 33.6 G/DL (ref 31.4–37.4)
MCV RBC AUTO: 95 FL (ref 82–98)
MONOCYTES # BLD AUTO: 0.69 THOUSAND/ΜL (ref 0.17–1.22)
MONOCYTES NFR BLD AUTO: 4 % (ref 4–12)
NEUTROPHILS # BLD AUTO: 13.53 THOUSANDS/ΜL (ref 1.85–7.62)
NEUTS SEG NFR BLD AUTO: 86 % (ref 43–75)
NITRITE UR QL STRIP: NEGATIVE
NON-SQ EPI CELLS URNS QL MICRO: ABNORMAL /HPF
NRBC BLD AUTO-RTO: 0 /100 WBCS
PH UR STRIP.AUTO: 6.5 [PH] (ref 4.5–8)
PLATELET # BLD AUTO: 226 THOUSANDS/UL (ref 149–390)
PMV BLD AUTO: 8.7 FL (ref 8.9–12.7)
POTASSIUM SERPL-SCNC: 3.7 MMOL/L (ref 3.5–5.3)
PROT SERPL-MCNC: 6.9 G/DL (ref 6.4–8.4)
PROT UR STRIP-MCNC: ABNORMAL MG/DL
RBC # BLD AUTO: 3.76 MILLION/UL (ref 3.81–5.12)
RBC #/AREA URNS AUTO: ABNORMAL /HPF
SODIUM SERPL-SCNC: 139 MMOL/L (ref 135–147)
SP GR UR STRIP.AUTO: 1.02 (ref 1–1.03)
UROBILINOGEN UR QL STRIP.AUTO: 0.2 E.U./DL
WBC # BLD AUTO: 16.09 THOUSAND/UL (ref 4.31–10.16)
WBC #/AREA URNS AUTO: ABNORMAL /HPF

## 2022-08-27 PROCEDURE — 85025 COMPLETE CBC W/AUTO DIFF WBC: CPT | Performed by: PHYSICIAN ASSISTANT

## 2022-08-27 PROCEDURE — 87077 CULTURE AEROBIC IDENTIFY: CPT

## 2022-08-27 PROCEDURE — 99284 EMERGENCY DEPT VISIT MOD MDM: CPT

## 2022-08-27 PROCEDURE — NC001 PR NO CHARGE: Performed by: OBSTETRICS & GYNECOLOGY

## 2022-08-27 PROCEDURE — 80053 COMPREHEN METABOLIC PANEL: CPT | Performed by: PHYSICIAN ASSISTANT

## 2022-08-27 PROCEDURE — 36415 COLL VENOUS BLD VENIPUNCTURE: CPT | Performed by: PHYSICIAN ASSISTANT

## 2022-08-27 PROCEDURE — 99284 EMERGENCY DEPT VISIT MOD MDM: CPT | Performed by: PHYSICIAN ASSISTANT

## 2022-08-27 PROCEDURE — 87086 URINE CULTURE/COLONY COUNT: CPT

## 2022-08-27 PROCEDURE — 81001 URINALYSIS AUTO W/SCOPE: CPT

## 2022-08-27 NOTE — ED PROVIDER NOTES
History  Chief Complaint   Patient presents with    Postpartum Complications     4 days postpartum, vaginal delievery, uncomplicated, g1, pt reports light vaginal bleeding and one episode of blood clot, pt reports pelvic pain     25 y/o F  4d postpartum presenting for evaluation of vaginal bleeding and pelvic cramping since today  She has been having light spotting since delivery but states today she had passed a large clot  She states she has a , no complications at birth or during pregnancy  She states bleeding has been bright red, changing pad every 1-2 hours, not soaked  She states clot was dark red/maroon in color about the size of an egg  She resumed normal light bleeding after passing the clot  She continued with cramping which she describes as similar to period cramps  History provided by:  Patient   used: No    Vaginal Bleeding  Quality:  Bright red and clots  Severity:  Mild  Onset quality:  Gradual  Timing:  Constant  Progression:  Unchanged  Chronicity:  New  Menstrual history: postpartum  Possible pregnancy: no    Context comment:  Postpartum  Relieved by:  None tried  Ineffective treatments:  None tried  Associated symptoms: no abdominal pain, no back pain, no dizziness, no dysuria, no fever, no nausea and no vaginal discharge        Prior to Admission Medications   Prescriptions Last Dose Informant Patient Reported? Taking?    Prenatal MV-Min-Fe Fum-FA-DHA (PRENATAL 1 PO)  Self Yes No   Sig: Take by mouth   acetaminophen (TYLENOL) 325 mg tablet  Self Yes No   Sig: Take 650 mg by mouth every 6 (six) hours as needed for mild pain   benzocaine-menthol-lanolin-aloe (DERMOPLAST) 20-0 5 % topical spray   No No   Sig: Apply 1 application topically every 6 (six) hours as needed for mild pain   docusate sodium (COLACE) 100 mg capsule   No No   Sig: Take 1 capsule (100 mg total) by mouth 2 (two) times a day   hydrocortisone 1 % cream   No No   Sig: Apply 1 application topically daily as needed for irritation   ibuprofen (MOTRIN) 600 mg tablet   No No   Sig: Take 1 tablet (600 mg total) by mouth every 6 (six) hours   witch hazel-glycerin (TUCKS) topical pad   No No   Sig: Apply 1 pad topically every 4 (four) hours as needed for irritation      Facility-Administered Medications: None       Past Medical History:   Diagnosis Date    Allergic rhinitis     Eczema     Kidney stone     Varicella     immunized       Past Surgical History:   Procedure Laterality Date    WISDOM TOOTH EXTRACTION         Family History   Problem Relation Age of Onset    No Known Problems Mother     No Known Problems Father     No Known Problems Brother     No Known Problems Brother     Mental illness Neg Hx     Substance Abuse Neg Hx      I have reviewed and agree with the history as documented  E-Cigarette/Vaping    E-Cigarette Use Former User     Quit Date 6/15/21      E-Cigarette/Vaping Substances    Nicotine Yes     THC No     CBD No     Flavoring No     Other No     Unknown No      Social History     Tobacco Use    Smoking status: Former Smoker     Packs/day: 0 25     Years: 5 00     Pack years: 1 25     Types: Cigarettes     Quit date:      Years since quittin 6    Smokeless tobacco: Never Used    Tobacco comment: vapes   Vaping Use    Vaping Use: Former    Quit date: 6/15/2021    Substances: Nicotine   Substance Use Topics    Alcohol use: Not Currently     Comment: occ    Drug use: No       Review of Systems   Constitutional: Negative for chills and fever  HENT: Negative for congestion, ear pain, rhinorrhea, sinus pain, sore throat and trouble swallowing  Eyes: Negative for redness and visual disturbance  Respiratory: Negative for cough and shortness of breath  Cardiovascular: Negative for chest pain, palpitations and leg swelling  Gastrointestinal: Negative for abdominal pain, constipation, diarrhea, nausea and vomiting     Genitourinary: Positive for pelvic pain and vaginal bleeding  Negative for dysuria, frequency, hematuria, urgency and vaginal discharge  Musculoskeletal: Negative for back pain, myalgias and neck pain  Skin: Negative for color change and rash  Neurological: Negative for dizziness, weakness, light-headedness, numbness and headaches  Psychiatric/Behavioral: The patient is not nervous/anxious  All other systems reviewed and are negative  Physical Exam  Physical Exam  Vitals reviewed  Constitutional:       General: She is not in acute distress  Appearance: Normal appearance  She is well-developed and well-groomed  She is not ill-appearing, toxic-appearing or diaphoretic  HENT:      Head: Normocephalic and atraumatic  Right Ear: External ear normal       Left Ear: External ear normal       Nose: Nose normal  No congestion or rhinorrhea  Mouth/Throat:      Mouth: Mucous membranes are moist       Pharynx: Oropharynx is clear  No oropharyngeal exudate or posterior oropharyngeal erythema  Eyes:      General: No scleral icterus  Right eye: No discharge  Left eye: No discharge  Extraocular Movements: Extraocular movements intact  Conjunctiva/sclera: Conjunctivae normal    Cardiovascular:      Rate and Rhythm: Normal rate and regular rhythm  Pulses: Normal pulses  Heart sounds: No murmur heard  No friction rub  No gallop  Pulmonary:      Effort: Pulmonary effort is normal  No respiratory distress  Breath sounds: Normal breath sounds  No wheezing, rhonchi or rales  Abdominal:      General: Abdomen is flat  There is no distension  Palpations: Abdomen is soft  Tenderness: There is abdominal tenderness in the right lower quadrant, suprapubic area and left lower quadrant  There is no right CVA tenderness, left CVA tenderness, guarding or rebound  Musculoskeletal:         General: No deformity  Normal range of motion  Cervical back: Normal range of motion and neck supple  Skin:     General: Skin is warm and dry  Coloration: Skin is not jaundiced or pale  Findings: No rash  Neurological:      General: No focal deficit present  Mental Status: She is alert  Psychiatric:         Mood and Affect: Mood normal          Behavior: Behavior normal  Behavior is cooperative  Vital Signs  ED Triage Vitals   Temperature Pulse Respirations Blood Pressure SpO2   08/27/22 0112 08/27/22 0112 08/27/22 0112 08/27/22 0112 08/27/22 0112   98 °F (36 7 °C) 86 17 130/60 99 %      Temp src Heart Rate Source Patient Position - Orthostatic VS BP Location FiO2 (%)   -- 08/27/22 0334 08/27/22 0334 08/27/22 0334 --    Monitor Lying Left arm       Pain Score       08/27/22 0334       8           Vitals:    08/27/22 0112 08/27/22 0334   BP: 130/60 135/80   Pulse: 86 76   Patient Position - Orthostatic VS:  Lying         Visual Acuity      ED Medications  Medications - No data to display    Diagnostic Studies  Results Reviewed     Procedure Component Value Units Date/Time    Urine Microscopic [[id number]]  (Abnormal) Collected: 08/27/22 0413    Lab Status: Final result Specimen: Urine, Clean Catch Updated: 08/27/22 0450     RBC, UA 10-20 /hpf      WBC, UA Innumerable /hpf      Epithelial Cells Moderate /hpf      Bacteria, UA Innumerable /hpf     Urine culture [[id number]] Collected: 08/27/22 0413    Lab Status:  In process Specimen: Urine, Clean Catch Updated: 08/27/22 0450    Comprehensive metabolic panel [[id number]]  (Abnormal) Collected: 08/27/22 0401    Lab Status: Final result Specimen: Blood from Arm, Right Updated: 08/27/22 0426     Sodium 139 mmol/L      Potassium 3 7 mmol/L      Chloride 104 mmol/L      CO2 25 mmol/L      ANION GAP 10 mmol/L      BUN 11 mg/dL      Creatinine 0 93 mg/dL      Glucose 112 mg/dL      Calcium 8 8 mg/dL      Corrected Calcium 9 9 mg/dL      AST 39 U/L      ALT 42 U/L      Alkaline Phosphatase 104 U/L      Total Protein 6 9 g/dL      Albumin 2 6 g/dL Total Bilirubin 0 23 mg/dL      eGFR 87 ml/min/1 73sq m     Narrative:      National Kidney Disease Foundation guidelines for Chronic Kidney Disease (CKD):     Stage 1 with normal or high GFR (GFR > 90 mL/min/1 73 square meters)    Stage 2 Mild CKD (GFR = 60-89 mL/min/1 73 square meters)    Stage 3A Moderate CKD (GFR = 45-59 mL/min/1 73 square meters)    Stage 3B Moderate CKD (GFR = 30-44 mL/min/1 73 square meters)    Stage 4 Severe CKD (GFR = 15-29 mL/min/1 73 square meters)    Stage 5 End Stage CKD (GFR <15 mL/min/1 73 square meters)  Note: GFR calculation is accurate only with a steady state creatinine    Urine Macroscopic, POC [[id number]]  (Abnormal) Collected: 08/27/22 0413    Lab Status: Final result Specimen: Urine Updated: 08/27/22 0415     Color, UA Yellow     Clarity, UA Cloudy     pH, UA 6 5     Leukocytes, UA Small     Nitrite, UA Negative     Protein, UA Trace mg/dl      Glucose, UA Negative mg/dl      Ketones, UA Negative mg/dl      Urobilinogen, UA 0 2 E U /dl      Bilirubin, UA Negative     Occult Blood, UA Large     Specific Gravity, UA 1 025    Narrative:      CLINITEK RESULT    CBC and differential [[id number]]  (Abnormal) Collected: 08/27/22 0401    Lab Status: Final result Specimen: Blood from Arm, Right Updated: 08/27/22 0409     WBC 16 09 Thousand/uL      RBC 3 76 Million/uL      Hemoglobin 12 0 g/dL      Hematocrit 35 7 %      MCV 95 fL      MCH 31 9 pg      MCHC 33 6 g/dL      RDW 12 8 %      MPV 8 7 fL      Platelets 824 Thousands/uL      nRBC 0 /100 WBCs      Neutrophils Relative 86 %      Immat GRANS % 0 %      Lymphocytes Relative 9 %      Monocytes Relative 4 %      Eosinophils Relative 1 %      Basophils Relative 0 %      Neutrophils Absolute 13 53 Thousands/µL      Immature Grans Absolute 0 07 Thousand/uL      Lymphocytes Absolute 1 52 Thousands/µL      Monocytes Absolute 0 69 Thousand/µL      Eosinophils Absolute 0 23 Thousand/µL      Basophils Absolute 0 05 Thousands/µL No orders to display              Procedures  Procedures         ED Course  ED Course as of 08/27/22 0738   Sat Aug 27, 2022   2980 Dr Brit Leyva resident evaluated pt  States pt is stable for d/c w/ follow up to OBGYN  SBIRT 20yo+    Flowsheet Row Most Recent Value   SBIRT (25 yo +)    In order to provide better care to our patients, we are screening all of our patients for alcohol and drug use  Would it be okay to ask you these screening questions? No Filed at: 08/27/2022 5174            [facility]  Number of Diagnoses or Management Options  Postpartum exam: new and requires workup  Vaginal bleeding: new and requires workup  Diagnosis management comments:     Patient is 4 days postpartum presenting for vaginal bleeding and clot passage as well as cramping  On exam, patient has tenderness to palpation of the lower abdomen, abdomen is soft, non-distended  Will order CBC for eval of infection and anemia  CMP for liver functions and electrolytes  UA for evaluation of protein in urine  Will TT OBGYN resident Dr Kasia Fall to have her evaluate patient  Dr Kasia Fall did evaluate patient bedside  She states "everything you described is normal postpartum bleeding and pain  She can continue with Tylenol and Motrin  Follow up with her doctors  She already has a postpartum appt scheduled  She should keep that apt " Relayed information to patient  Pt is comfortable w/ discharge and plan of care at this time  Prior to discharge, discharge instructions were discussed with patient at bedside  Patient was provided both verbal and written instructions  Patient is understanding of the discharge instructions and is agreeable to plan of care  Return precautions were discussed with patient bedside, patient verbalized understanding of signs and symptoms that would necessitate return to the ED  All questions were answered  Patient was comfortable with the plan of care and discharged to home       Dispo: discharge home with follow up to OBGYN  Patient stable, in no acute distress and non-toxic at discharge  Amount and/or Complexity of Data Reviewed  Clinical lab tests: ordered and reviewed  Tests in the medicine section of CPT®: ordered and reviewed  Decide to obtain previous medical records or to obtain history from someone other than the patient: yes  Review and summarize past medical records: yes  Discuss the patient with other providers: yes  Independent visualization of images, tracings, or specimens: yes    Risk of Complications, Morbidity, and/or Mortality  Presenting problems: moderate  Diagnostic procedures: low  Management options: low    Patient Progress  Patient progress: stable      Disposition  Final diagnoses:   Vaginal bleeding   Postpartum exam     Time reflects when diagnosis was documented in both MDM as applicable and the Disposition within this note     Time User Action Codes Description Comment    8/27/2022  3:51 AM Gaurav Pendleton [N93 9] Vaginal bleeding     8/27/2022  4:49 AM Gaurav Pendleton [Z39 2] Postpartum exam       ED Disposition     ED Disposition   Discharge    Condition   Stable    Date/Time   Sat Aug 27, 2022  4:49 AM    Comment   Socorro Wen discharge to home/self care                 Follow-up Information     Follow up With Specialties Details Why Contact Info Additional [address] [facility] Schedule an appointment as soon as possible for a visit  As needed [address], [address] [facility], [address] [facility], [address] [place] [address]          Discharge Medication List as of 8/27/2022  4:54 AM      CONTINUE these medications which have NOT CHANGED    Details   acetaminophen (TYLENOL) 325 mg tablet Take 650 mg by mouth every 6 (six) hours as needed for mild pain, Historical Med benzocaine-menthol-lanolin-aloe (DERMOPLAST) 20-0 5 % topical spray Apply 1 application topically every 6 (six) hours as needed for mild pain, Starting Thu 8/25/2022, No Print      docusate sodium (COLACE) 100 mg capsule Take 1 capsule (100 mg total) by mouth 2 (two) times a day, Starting Th 8/25/2022, No Print      hydrocortisone 1 % cream Apply 1 application topically daily as needed for irritation, Starting Th 8/25/2022, Normal      ibuprofen (MOTRIN) 600 mg tablet Take 1 tablet (600 mg total) by mouth every 6 (six) hours, Starting Th 8/25/2022, No Print      Prenatal MV-Min-Fe Fum-FA-DHA (PRENATAL 1 PO) Take by mouth, Historical Med      witch hazel-glycerin (TUCKS) topical pad Apply 1 pad topically every 4 (four) hours as needed for irritation, Starting Th 8/25/2022, No Print             No discharge procedures on file      PDMP Review     None          ED Provider  Electronically Signed by TYE Boateng PA-C  08/27/22 7877

## 2022-08-27 NOTE — TELEPHONE ENCOUNTER
Postpartum day Four (4) vaginal delivery on 8/23  c/o passing egg sized clots, but no increased in bleeding  Reports sudden onset R back/abdominal pain constant for >1 hours rated 8/10  Also reports nausea  No additional symptoms  On call provider contacted and informed of patients concerns and status  Provider recommends proceeding to ED for evaluation  Patient informed of providers recommendation  Verbalized understanding and agreeable with disposition  No further questions

## 2022-08-27 NOTE — TELEPHONE ENCOUNTER
Reason for Disposition   [1] SEVERE abdominal pain AND [2] present > 1 hour    Answer Assessment - Initial Assessment Questions  1  ONSET: "Describe your bleeding: is it getting worse, staying the same, improving, or stopping and starting?"      Bleeding worsening, Size of egg   2  AMOUNT: "How much bleeding are you having today?"      - SPOTTING: spotting, or pinkish / brownish mucous discharge; does not fill panti-liner or pad     - MILD:  less than 1 pad / hour; less than patient's usual menstrual bleeding    - MODERATE: 1-2 pads / hour; 1 menstrual cup every 6 hours; small-medium blood clots (e g , pea, grape, small coin)    - SEVERE: soaking 2 or more pads/hour for 2 or more hours; 1 menstrual cup every 2 hours; bleeding not contained by pads or continuous red blood from vagina; large blood clots (e g , golf ball, large coin)    Mild   3  ABDOMINAL PAIN: "Do you have any pain?" "How bad is the pain?" "What does it keep you from doing?"      - MILD -  doesn't interfere with normal activities, abdomen soft and not tender to touch      - MODERATE - interferes with normal activities or awakens from sleep, tender to touch      - SEVERE - excruciating pain, doubled over, unable to do any normal activities       8/10, Right lower abdominal pain   4  HORMONES: "Are you taking any birth control medications?" (e g , birth control pills, Depo-Provera)      Denies   5  BLOOD THINNERS: "Do you take any blood thinners?" (e g , coumadin, aspirin)     Denies   6  OTHER SYMPTOMS: "Do you have any other symptoms?" (e g , fever, chills, dizziness)    Nausea  7  DELIVERY DATE: "When was your delivery date?" "Vaginal delivery or ?"      Vaginal 22  8   BREASTFEEDING: "Are you breastfeeding?"      Confirms    Protocols used: POSTPARTUM - VAGINAL BLEEDING AND LOCHIA-ADULT-AH

## 2022-08-27 NOTE — TELEPHONE ENCOUNTER
Regarding: post partum blood clot/ nausea/ cramps  ----- Message from Ingrid Poon sent at 8/26/2022 11:28 PM EDT -----  " I just had my baby a couple days ago   Today I relased a blood clot and I feel nauseous and I have cramps "

## 2022-08-27 NOTE — DISCHARGE INSTRUCTIONS
- Take tylenol and motrin as needed for pain  Use as directed on the box  - F/u w/ OBGYN    Please refer to the attached information for strict return instructions  If symptoms worsen or new symptoms develop please return to the ER  Please follow-up with your primary care physician for re-evaluation of symptoms

## 2022-08-27 NOTE — ASSESSMENT & PLAN NOTE
Postpartum bleeding consistent with recent vaginal delivery  Declined speculum exam  Hemoglobin of 12 0  Leukocytosis of WBC of 16 consistent with recent vaginal delivery; afebrile  Discharge home with outpatient follow-up  Discussed postpartum bleeding precautions

## 2022-08-27 NOTE — CONSULTS
Chief Complaint   Patient presents with   • Eye Problem       HISTORY OF PRESENT ILLNESS: Patient presents with red left eye for 2 days. Denies injury, chemical exposure, eye pain with movement, or photophobia. There has  been yellow drainage and crusting to lashes. There is pruritis. Patient does not wear contacts.  No over the counter medications/drops have been tried for treatment.     has no allergies on file.    No current outpatient prescriptions on file prior to visit.  No current facility-administered medications on file prior to visit.    has no tobacco history on file.      REVIEW OF SYSTEMS:  CONSTITUTIONAL:  Denies fever or chills.  HEENT: Denies change in visual acuity.  Patient denies nasal congestion or sore throat.  INTEGUMENT:  Denies rash.        OBJECTIVE:  VITAL SIGNS:    Visit Vitals  Pulse 80   Temp 98 °F (36.7 °C)   Resp 16   SpO2 98%     GENERAL:  Cheyenne Rosales is a 28 year old-year-old female who is well-developed and well nourished.  .She is in no acute distress, non-toxic appearance.   ENT:  Atraumatic. Eyes: PERRL, Red conjunctiva left eye. Normal conjunctiva right eye. Anterior chambers clear. No periorbital edema or erythema. No lymphadenopathy. Oropharynx clear and moist. No drainage in nares.  SKIN:  Well hydrated, no rash.   LUNGS: Clear to auscultation.  HEART: Regular rate and rhythm       ASSESSMENT:   1. Bacterial conjunctivitis        PLAN:   Orders Placed This Encounter   • trimethoprim-polymyxin b (POLYTRIM) ophthalmic solution       Discussed good hand hygiene. No sharing of washcloths or pillows.  Follow up with Primary Physician or urgent care if signs and symptoms do not improve.   Consult - OB/GYN   Heather Light 24 y o  female MRN: [id number]  Unit/Bed#: ED 20 Encounter: [id number]    Assessment:   24 y o   sexually active reproductive aged female with normal postpartum vaginal bleeding 4 days from normal   Plan:   Postpartum care and examination  Assessment & Plan  Postpartum bleeding consistent with recent vaginal delivery  Declined speculum exam  Hemoglobin of 12 0  Leukocytosis of WBC of 16 consistent with recent vaginal delivery; afebrile  Discharge home with outpatient follow-up  Discussed postpartum bleeding precautions      Discussed case and plan w/ Dr Amaris Zambrano    HPI:  Jackson Bryan is a 79-year-old  PPD#4 from an uncomplicated spontaneous vaginal delivery at [address] now presenting with an episode of vaginal bleeding and a 5 cm blood clot that she passed after going shopping at [employer]  She states that her vaginal delivery was uncomplicated and she was discharged home on postpartum day 2  Patient endorses being at home for most of her postpartum  She decided to go shopping yesterday evening and after walking around noticed that she passed a large blood clot"  Patient endorses cramping after taking a lactation pill since she has been having difficulty is breast feeding  She does state that she has not pumped in the last 1 5 days  Overall she feels well but was concerned about a blood clot since she has not had any blood clots since she delivered      Active Problems:  Patient Active Problem List   Diagnosis    Head injury    Intractable acute post-traumatic headache    Musculoskeletal pain of right upper extremity    Trapezius muscle spasm    Smoking     (spontaneous vaginal delivery)    Elevated blood-pressure reading without diagnosis of hypertension     PMH:  Past Medical History:   Diagnosis Date    Allergic rhinitis     Eczema     Kidney stone     Varicella     immunized     PSH:  Past Surgical History:   Procedure Laterality Date    WISDOM TOOTH EXTRACTION         OB History  OB History    Para Term  AB Living   1 1 1 0 0 1   SAB IAB Ectopic Multiple Live Births   0 0 0 0 1      # Outcome Date GA Lbr Alfredo/2nd Weight Sex Delivery Anes PTL Lv   1 Term 22 39w2d / 00:10 3155 g (6 lb 15 3 oz) F Vag-Spont EPI N MICHELLE      Obstetric Comments   Menarche 11     Meds:  Current Facility-Administered Medications on File Prior to Encounter   Medication Dose Route Frequency Provider Last Rate Last Admin    [DISCONTINUED] lidocaine-epinephrine (XYLOCAINE-MPF/EPINEPHRINE) 1 5 %-1:200,000 injection   Infiltration PRN Keanu Gomez MD   5 mL at 22 [id number]     Current Outpatient Medications on File Prior to Encounter   Medication Sig Dispense Refill    acetaminophen (TYLENOL) 325 mg tablet Take 650 mg by mouth every 6 (six) hours as needed for mild pain      benzocaine-menthol-lanolin-aloe (DERMOPLAST) 20-0 5 % topical spray Apply 1 application topically every 6 (six) hours as needed for mild pain  0    docusate sodium (COLACE) 100 mg capsule Take 1 capsule (100 mg total) by mouth 2 (two) times a day  0    hydrocortisone 1 % cream Apply 1 application topically daily as needed for irritation 30 g 0    ibuprofen (MOTRIN) 600 mg tablet Take 1 tablet (600 mg total) by mouth every 6 (six) hours 30 tablet 0    Prenatal MV-Min-Fe Fum-FA-DHA (PRENATAL 1 PO) Take by mouth      witch hazel-glycerin (TUCKS) topical pad Apply 1 pad topically every 4 (four) hours as needed for irritation  0       Allergies:  No Known Allergies    Physical Exam:  /80 (BP Location: Left arm)   Pulse 76   Temp 98 °F (36 7 °C)   Resp 17   LMP 2021 (Exact Date)   SpO2 95%   Breastfeeding Yes     Physical Exam  Vitals reviewed  Constitutional:       Appearance: Normal appearance  HENT:      Head: Normocephalic and atraumatic  Eyes:      Extraocular Movements: Extraocular movements intact     Cardiovascular:      Rate and Rhythm: Normal rate  Pulses: Normal pulses  Pulmonary:      Effort: Pulmonary effort is normal       Breath sounds: Normal breath sounds  Abdominal:      Palpations: Abdomen is soft  Comments: Gravid uterus   Musculoskeletal:         General: Normal range of motion  Cervical back: Normal range of motion  Skin:     General: Skin is warm and dry  Neurological:      Mental Status: She is alert     Psychiatric:         Mood and Affect: Mood normal          Behavior: Behavior normal

## 2022-08-28 ENCOUNTER — PATIENT MESSAGE (OUTPATIENT)
Dept: OBGYN CLINIC | Facility: CLINIC | Age: 22
End: 2022-08-28

## 2022-08-29 LAB
BACTERIA UR CULT: ABNORMAL
BACTERIA UR CULT: ABNORMAL
PLACENTA IN STORAGE: NORMAL

## 2022-08-30 RX ORDER — METRONIDAZOLE 500 MG/1
500 TABLET ORAL EVERY 12 HOURS SCHEDULED
Status: ACTIVE | OUTPATIENT
Start: 2022-08-30 | End: 2022-09-06

## 2022-08-30 NOTE — RESULT ENCOUNTER NOTE
Attempted to call regardin +BV results on UC  No answer  LMOM to call ER  Will also forward results to gyn   No appt until october

## 2022-09-01 RX ORDER — METRONIDAZOLE 500 MG/1
500 TABLET ORAL EVERY 12 HOURS SCHEDULED
Qty: 14 TABLET | Refills: 0 | Status: SHIPPED | OUTPATIENT
Start: 2022-09-01 | End: 2022-09-08

## 2022-09-27 ENCOUNTER — TELEPHONE (OUTPATIENT)
Dept: OBGYN CLINIC | Facility: CLINIC | Age: 22
End: 2022-09-27

## 2022-09-27 NOTE — TELEPHONE ENCOUNTER
How are you feeling? Feeling well   Are you having any vaginal bleeding? No  Have you had any problems voiding? No  Have you had any problems moving your bowels? No  Type of Delivery?   Vaginal delivery - any issues with healing? No    Baby's name: Katalina Guidry   How is the baby doing? Baby is doing well   Are you breast/bottle feeding? Bottle feeding   How is that going? Decrease in supply of breast milk so it supplementing currently  Have you seen lactation consultant? Gave phone number due to decrease in supply  Are you having any baby blues? No  Do you have any help at home? Boyfriend is helping        Follow up appointment scheduled : 10/3  Patient advised to call the office for abnormal bleeding, mastitis, infection, any signs of postpartum depression  Patient verbalized understanding

## 2022-10-03 ENCOUNTER — POSTPARTUM VISIT (OUTPATIENT)
Dept: OBGYN CLINIC | Facility: CLINIC | Age: 22
End: 2022-10-03

## 2022-10-03 VITALS
HEIGHT: 64 IN | DIASTOLIC BLOOD PRESSURE: 64 MMHG | SYSTOLIC BLOOD PRESSURE: 100 MMHG | BODY MASS INDEX: 33.46 KG/M2 | WEIGHT: 196 LBS

## 2022-10-03 DIAGNOSIS — Z30.011 ORAL CONTRACEPTION INITIAL PRESCRIPTION: Primary | ICD-10-CM

## 2022-10-03 PROCEDURE — 99024 POSTOP FOLLOW-UP VISIT: CPT | Performed by: OBSTETRICS & GYNECOLOGY

## 2022-10-03 RX ORDER — NORETHINDRONE ACETATE AND ETHINYL ESTRADIOL 1MG-20(21)
1 KIT ORAL DAILY
Qty: 84 TABLET | Refills: 3 | Status: SHIPPED | OUTPATIENT
Start: 2022-10-03 | End: 2022-12-26

## 2022-10-04 ENCOUNTER — TELEPHONE (OUTPATIENT)
Dept: PSYCHIATRY | Facility: CLINIC | Age: 22
End: 2022-10-04

## 2022-10-04 NOTE — PROGRESS NOTES
Assessment/Plan     Normal postpartum exam      1  Contraception: OCP (estrogen/progesterone)  2  Annual exam due in now  3  Postpartum sadness- will refer to [facility] for counseling  Offered emotional support and encouraged to call with any concerns  4  Increase activity as tolerated, may resume all normal activity at 6 weeks postpartum  5  Anticipated return to work: 6 - 12 weeks post partum  Griselda Blue is a 20 y o  female who presents for a postpartum visit  She is 6 weeks postpartum following a spontaneous vaginal delivery  I have fully reviewed the prenatal and intrapartum course  The delivery was at 39 2 gestational weeks  Anesthesia: epidural  Laceration: 1st degree  Bleeding no bleeding  Bowel function is normal  Bladder function is normal  Patient has not traveled outside the [place]  within the last 14 days  been sexually active  Desired contraception method is OCP (estrogen/progesterone)  Postpartum depression screening: positive  EPDS : 16   Pt  Denies any suicidal or homicidal thoughts  States that just a few days ago the FOB left home  He is having a difficult time being a parent  Patient does have support from her mother, but is living on her own at this time  Not currently in therapy, but open to it  Going back to work soon, and a friend will be babysitting  Baby's course has been unremarkable     Baby is feeding by bottle    Last Pap : 01/2021 ; [facility]  Gestational Diabetes: no  Pregnancy Complications: none    The following portions of the patient's history were reviewed and updated as appropriate: allergies, past family history, past social history and problem list       Current Outpatient Medications:     norethindrone-ethinyl estradiol (Junel FE 1/20) 1-20 MG-MCG per tablet, Take 1 tablet by mouth daily, Disp: 84 tablet, Rfl: 3    acetaminophen (TYLENOL) 325 mg tablet, Take 650 mg by mouth every 6 (six) hours as needed for mild pain (Patient not taking: Reported on 10/3/2022), Disp: , Rfl:     benzocaine-menthol-lanolin-aloe (DERMOPLAST) 20-0 5 % topical spray, Apply 1 application topically every 6 (six) hours as needed for mild pain (Patient not taking: Reported on 10/3/2022), Disp: , Rfl: 0    docusate sodium (COLACE) 100 mg capsule, Take 1 capsule (100 mg total) by mouth 2 (two) times a day (Patient not taking: Reported on 10/3/2022), Disp: , Rfl: 0    hydrocortisone 1 % cream, Apply 1 application topically daily as needed for irritation (Patient not taking: Reported on 10/3/2022), Disp: 30 g, Rfl: 0    ibuprofen (MOTRIN) 600 mg tablet, Take 1 tablet (600 mg total) by mouth every 6 (six) hours (Patient not taking: Reported on 10/3/2022), Disp: 30 tablet, Rfl: 0    Prenatal MV-Min-Fe Fum-FA-DHA (PRENATAL 1 PO), Take by mouth (Patient not taking: Reported on 10/3/2022), Disp: , Rfl:     witch hazel-glycerin (TUCKS) topical pad, Apply 1 pad topically every 4 (four) hours as needed for irritation (Patient not taking: Reported on 10/3/2022), Disp: , Rfl: 0    No Known Allergies    Review of Systems  Constitutional: no fever, feels well  Breasts: no complaints of breast pain, breast lump, or nipple discharge  Gastrointestinal: no complaints nausea, vomiting  Genitourinary: as noted in HPI    Neurological: no complaints of headache      Objective      /64 (BP Location: Right arm, Patient Position: Sitting, Cuff Size: Standard)   Ht 5' 4" (1 626 m)   Wt 88 9 kg (196 lb)   LMP 11/21/2021 (Exact Date)   Breastfeeding No   BMI 33 64 kg/m²     OBGyn Exam  General: alert and oriented, in no acute distress

## 2022-10-04 NOTE — TELEPHONE ENCOUNTER
Called [employer]  regarding routine  Lvm advising to return call to Intake Dept at [phone] to be placed on appropriate wait list

## 2022-10-07 NOTE — TELEPHONE ENCOUNTER
" Called ( patient ,   ) regarding (Routine ,) referral  LVM advising to return call to intake @ [phone] to be ( placed on wait list)  "

## 2022-10-14 ENCOUNTER — TELEPHONE (OUTPATIENT)
Dept: PSYCHIATRY | Facility: CLINIC | Age: 22
End: 2022-10-14

## 2022-11-02 ENCOUNTER — HOSPITAL ENCOUNTER (OUTPATIENT)
Facility: HOSPITAL | Age: 22
Discharge: HOME | End: 2022-11-03
Attending: EMERGENCY MEDICINE | Admitting: STUDENT IN AN ORGANIZED HEALTH CARE EDUCATION/TRAINING PROGRAM

## 2022-11-02 ENCOUNTER — ANESTHESIA (OUTPATIENT)
Dept: PERIOP | Facility: HOSPITAL | Age: 22
End: 2022-11-02

## 2022-11-02 ENCOUNTER — APPOINTMENT (EMERGENCY)
Dept: RADIOLOGY | Facility: HOSPITAL | Age: 22
End: 2022-11-02

## 2022-11-02 ENCOUNTER — ANESTHESIA EVENT (OUTPATIENT)
Dept: PERIOP | Facility: HOSPITAL | Age: 22
End: 2022-11-02

## 2022-11-02 DIAGNOSIS — K35.80 ACUTE APPENDICITIS: Primary | ICD-10-CM

## 2022-11-02 DIAGNOSIS — R10.9 ABDOMINAL PAIN: ICD-10-CM

## 2022-11-02 PROBLEM — E66.9 OBESITY: Status: ACTIVE | Noted: 2022-11-02

## 2022-11-02 LAB
ALBUMIN SERPL BCP-MCNC: 3.3 G/DL (ref 3.5–5)
ALP SERPL-CCNC: 65 U/L (ref 46–116)
ALT SERPL W P-5'-P-CCNC: 17 U/L (ref 12–78)
ANION GAP SERPL CALCULATED.3IONS-SCNC: 5 MMOL/L (ref 4–13)
AST SERPL W P-5'-P-CCNC: 12 U/L (ref 5–45)
BASOPHILS # BLD AUTO: 0.04 THOUSANDS/ÂΜL (ref 0–0.1)
BASOPHILS NFR BLD AUTO: 0 % (ref 0–1)
BILIRUB SERPL-MCNC: 0.47 MG/DL (ref 0.2–1)
BUN SERPL-MCNC: 8 MG/DL (ref 5–25)
CALCIUM ALBUM COR SERPL-MCNC: 9.7 MG/DL (ref 8.3–10.1)
CALCIUM SERPL-MCNC: 9.1 MG/DL (ref 8.3–10.1)
CHLORIDE SERPL-SCNC: 108 MMOL/L (ref 96–108)
CO2 SERPL-SCNC: 24 MMOL/L (ref 21–32)
CREAT SERPL-MCNC: 0.87 MG/DL (ref 0.6–1.3)
EOSINOPHIL # BLD AUTO: 0.11 THOUSAND/ÂΜL (ref 0–0.61)
EOSINOPHIL NFR BLD AUTO: 1 % (ref 0–6)
ERYTHROCYTE [DISTWIDTH] IN BLOOD BY AUTOMATED COUNT: 12.3 % (ref 11.6–15.1)
GFR SERPL CREATININE-BSD FRML MDRD: 94 ML/MIN/1.73SQ M
GLUCOSE SERPL-MCNC: 93 MG/DL (ref 65–140)
HCG SERPL QL: NEGATIVE
HCT VFR BLD AUTO: 35.6 % (ref 34.8–46.1)
HGB BLD-MCNC: 11.6 G/DL (ref 11.5–15.4)
IMM GRANULOCYTES # BLD AUTO: 0.04 THOUSAND/UL (ref 0–0.2)
IMM GRANULOCYTES NFR BLD AUTO: 0 % (ref 0–2)
LIPASE SERPL-CCNC: 101 U/L (ref 73–393)
LYMPHOCYTES # BLD AUTO: 2.78 THOUSANDS/ÂΜL (ref 0.6–4.47)
LYMPHOCYTES NFR BLD AUTO: 26 % (ref 14–44)
MCH RBC QN AUTO: 30.4 PG (ref 26.8–34.3)
MCHC RBC AUTO-ENTMCNC: 32.6 G/DL (ref 31.4–37.4)
MCV RBC AUTO: 93 FL (ref 82–98)
MONOCYTES # BLD AUTO: 0.66 THOUSAND/ÂΜL (ref 0.17–1.22)
MONOCYTES NFR BLD AUTO: 6 % (ref 4–12)
NEUTROPHILS # BLD AUTO: 7.18 THOUSANDS/ÂΜL (ref 1.85–7.62)
NEUTS SEG NFR BLD AUTO: 67 % (ref 43–75)
NRBC BLD AUTO-RTO: 0 /100 WBCS
PLATELET # BLD AUTO: 277 THOUSANDS/UL (ref 149–390)
PMV BLD AUTO: 9.8 FL (ref 8.9–12.7)
POTASSIUM SERPL-SCNC: 3.6 MMOL/L (ref 3.5–5.3)
PROT SERPL-MCNC: 7.7 G/DL (ref 6.4–8.4)
RBC # BLD AUTO: 3.81 MILLION/UL (ref 3.81–5.12)
SODIUM SERPL-SCNC: 137 MMOL/L (ref 135–147)
WBC # BLD AUTO: 10.81 THOUSAND/UL (ref 4.31–10.16)

## 2022-11-02 RX ORDER — PROMETHAZINE HYDROCHLORIDE 25 MG/ML
25 INJECTION, SOLUTION INTRAMUSCULAR; INTRAVENOUS ONCE AS NEEDED
Status: DISCONTINUED | OUTPATIENT
Start: 2022-11-02 | End: 2022-11-02 | Stop reason: HOSPADM

## 2022-11-02 RX ORDER — FENTANYL CITRATE 50 UG/ML
INJECTION, SOLUTION INTRAMUSCULAR; INTRAVENOUS AS NEEDED
Status: DISCONTINUED | OUTPATIENT
Start: 2022-11-02 | End: 2022-11-02

## 2022-11-02 RX ORDER — HYDROMORPHONE HCL IN WATER/PF 6 MG/30 ML
0.2 PATIENT CONTROLLED ANALGESIA SYRINGE INTRAVENOUS
Status: DISCONTINUED | OUTPATIENT
Start: 2022-11-02 | End: 2022-11-03 | Stop reason: HOSPADM

## 2022-11-02 RX ORDER — CEFAZOLIN SODIUM 1 G/3ML
INJECTION, POWDER, FOR SOLUTION INTRAMUSCULAR; INTRAVENOUS AS NEEDED
Status: DISCONTINUED | OUTPATIENT
Start: 2022-11-02 | End: 2022-11-02

## 2022-11-02 RX ORDER — METRONIDAZOLE 500 MG/100ML
500 INJECTION, SOLUTION INTRAVENOUS EVERY 8 HOURS
Status: DISCONTINUED | OUTPATIENT
Start: 2022-11-02 | End: 2022-11-03 | Stop reason: HOSPADM

## 2022-11-02 RX ORDER — KETOROLAC TROMETHAMINE 30 MG/ML
15 INJECTION, SOLUTION INTRAMUSCULAR; INTRAVENOUS ONCE
Status: COMPLETED | OUTPATIENT
Start: 2022-11-02 | End: 2022-11-02

## 2022-11-02 RX ORDER — KETOROLAC TROMETHAMINE 30 MG/ML
INJECTION, SOLUTION INTRAMUSCULAR; INTRAVENOUS AS NEEDED
Status: DISCONTINUED | OUTPATIENT
Start: 2022-11-02 | End: 2022-11-02

## 2022-11-02 RX ORDER — PROPOFOL 10 MG/ML
INJECTION, EMULSION INTRAVENOUS AS NEEDED
Status: DISCONTINUED | OUTPATIENT
Start: 2022-11-02 | End: 2022-11-02

## 2022-11-02 RX ORDER — METRONIDAZOLE 500 MG/100ML
500 INJECTION, SOLUTION INTRAVENOUS ONCE
Status: COMPLETED | OUTPATIENT
Start: 2022-11-02 | End: 2022-11-02

## 2022-11-02 RX ORDER — LIDOCAINE HYDROCHLORIDE 10 MG/ML
INJECTION, SOLUTION EPIDURAL; INFILTRATION; INTRACAUDAL; PERINEURAL AS NEEDED
Status: DISCONTINUED | OUTPATIENT
Start: 2022-11-02 | End: 2022-11-02

## 2022-11-02 RX ORDER — MIDAZOLAM HYDROCHLORIDE 2 MG/2ML
INJECTION, SOLUTION INTRAMUSCULAR; INTRAVENOUS AS NEEDED
Status: DISCONTINUED | OUTPATIENT
Start: 2022-11-02 | End: 2022-11-02

## 2022-11-02 RX ORDER — ONDANSETRON 2 MG/ML
INJECTION INTRAMUSCULAR; INTRAVENOUS AS NEEDED
Status: DISCONTINUED | OUTPATIENT
Start: 2022-11-02 | End: 2022-11-02

## 2022-11-02 RX ORDER — CEFAZOLIN SODIUM 2 G/50ML
2000 SOLUTION INTRAVENOUS EVERY 8 HOURS
Status: COMPLETED | OUTPATIENT
Start: 2022-11-02 | End: 2022-11-03

## 2022-11-02 RX ORDER — ONDANSETRON 2 MG/ML
4 INJECTION INTRAMUSCULAR; INTRAVENOUS EVERY 6 HOURS PRN
Status: DISCONTINUED | OUTPATIENT
Start: 2022-11-02 | End: 2022-11-03 | Stop reason: HOSPADM

## 2022-11-02 RX ORDER — ALBUTEROL SULFATE 2.5 MG/3ML
2.5 SOLUTION RESPIRATORY (INHALATION) ONCE AS NEEDED
Status: DISCONTINUED | OUTPATIENT
Start: 2022-11-02 | End: 2022-11-02 | Stop reason: HOSPADM

## 2022-11-02 RX ORDER — HYDROMORPHONE HCL/PF 1 MG/ML
0.5 SYRINGE (ML) INJECTION
Status: DISCONTINUED | OUTPATIENT
Start: 2022-11-02 | End: 2022-11-03 | Stop reason: HOSPADM

## 2022-11-02 RX ORDER — FENTANYL CITRATE/PF 50 MCG/ML
50 SYRINGE (ML) INJECTION
Status: DISCONTINUED | OUTPATIENT
Start: 2022-11-02 | End: 2022-11-02 | Stop reason: HOSPADM

## 2022-11-02 RX ORDER — SODIUM CHLORIDE, SODIUM LACTATE, POTASSIUM CHLORIDE, CALCIUM CHLORIDE 600; 310; 30; 20 MG/100ML; MG/100ML; MG/100ML; MG/100ML
75 INJECTION, SOLUTION INTRAVENOUS CONTINUOUS
Status: DISCONTINUED | OUTPATIENT
Start: 2022-11-02 | End: 2022-11-03

## 2022-11-02 RX ORDER — ROCURONIUM BROMIDE 10 MG/ML
INJECTION, SOLUTION INTRAVENOUS AS NEEDED
Status: DISCONTINUED | OUTPATIENT
Start: 2022-11-02 | End: 2022-11-02

## 2022-11-02 RX ORDER — HEPARIN SODIUM 5000 [USP'U]/ML
5000 INJECTION, SOLUTION INTRAVENOUS; SUBCUTANEOUS EVERY 8 HOURS SCHEDULED
Status: DISCONTINUED | OUTPATIENT
Start: 2022-11-02 | End: 2022-11-03 | Stop reason: HOSPADM

## 2022-11-02 RX ORDER — DEXAMETHASONE SODIUM PHOSPHATE 10 MG/ML
INJECTION, SOLUTION INTRAMUSCULAR; INTRAVENOUS AS NEEDED
Status: DISCONTINUED | OUTPATIENT
Start: 2022-11-02 | End: 2022-11-02

## 2022-11-02 RX ADMIN — FENTANYL CITRATE 50 MCG: 50 INJECTION INTRAMUSCULAR; INTRAVENOUS at 13:22

## 2022-11-02 RX ADMIN — SODIUM CHLORIDE, SODIUM LACTATE, POTASSIUM CHLORIDE, AND CALCIUM CHLORIDE: .6; .31; .03; .02 INJECTION, SOLUTION INTRAVENOUS at 12:52

## 2022-11-02 RX ADMIN — CEFAZOLIN SODIUM 2000 MG: 2 SOLUTION INTRAVENOUS at 21:03

## 2022-11-02 RX ADMIN — SUGAMMADEX 200 MG: 100 INJECTION, SOLUTION INTRAVENOUS at 14:37

## 2022-11-02 RX ADMIN — MORPHINE SULFATE 2 MG: 2 INJECTION, SOLUTION INTRAMUSCULAR; INTRAVENOUS at 10:12

## 2022-11-02 RX ADMIN — ONDANSETRON 4 MG: 2 INJECTION INTRAMUSCULAR; INTRAVENOUS at 14:35

## 2022-11-02 RX ADMIN — HEPARIN SODIUM 5000 UNITS: 5000 INJECTION INTRAVENOUS; SUBCUTANEOUS at 16:04

## 2022-11-02 RX ADMIN — LIDOCAINE HYDROCHLORIDE 50 MG: 10 INJECTION, SOLUTION EPIDURAL; INFILTRATION; INTRACAUDAL; PERINEURAL at 13:04

## 2022-11-02 RX ADMIN — FENTANYL CITRATE 50 MCG: 50 INJECTION INTRAMUSCULAR; INTRAVENOUS at 13:04

## 2022-11-02 RX ADMIN — CEFAZOLIN 2000 MG: 1 INJECTION, POWDER, FOR SOLUTION INTRAMUSCULAR; INTRAVENOUS at 13:07

## 2022-11-02 RX ADMIN — KETOROLAC TROMETHAMINE 15 MG: 30 INJECTION, SOLUTION INTRAMUSCULAR; INTRAVENOUS at 05:36

## 2022-11-02 RX ADMIN — MIDAZOLAM 2 MG: 1 INJECTION INTRAMUSCULAR; INTRAVENOUS at 12:52

## 2022-11-02 RX ADMIN — CEFTRIAXONE SODIUM 1000 MG: 10 INJECTION, POWDER, FOR SOLUTION INTRAVENOUS at 11:30

## 2022-11-02 RX ADMIN — PROPOFOL 200 MG: 10 INJECTION, EMULSION INTRAVENOUS at 13:04

## 2022-11-02 RX ADMIN — SODIUM CHLORIDE, SODIUM LACTATE, POTASSIUM CHLORIDE, AND CALCIUM CHLORIDE 75 ML/HR: .6; .31; .03; .02 INJECTION, SOLUTION INTRAVENOUS at 15:24

## 2022-11-02 RX ADMIN — ROCURONIUM BROMIDE 50 MG: 50 INJECTION INTRAVENOUS at 13:04

## 2022-11-02 RX ADMIN — HEPARIN SODIUM 5000 UNITS: 5000 INJECTION INTRAVENOUS; SUBCUTANEOUS at 21:03

## 2022-11-02 RX ADMIN — DEXAMETHASONE SODIUM PHOSPHATE 10 MG: 10 INJECTION, SOLUTION INTRAMUSCULAR; INTRAVENOUS at 13:04

## 2022-11-02 RX ADMIN — HYDROMORPHONE HYDROCHLORIDE 0.2 MG: 0.2 INJECTION, SOLUTION INTRAMUSCULAR; INTRAVENOUS; SUBCUTANEOUS at 16:03

## 2022-11-02 RX ADMIN — IOHEXOL 100 ML: 350 INJECTION, SOLUTION INTRAVENOUS at 06:47

## 2022-11-02 RX ADMIN — METRONIDAZOLE: 500 SOLUTION INTRAVENOUS at 13:07

## 2022-11-02 RX ADMIN — SODIUM CHLORIDE 1000 ML: 0.9 INJECTION, SOLUTION INTRAVENOUS at 05:36

## 2022-11-02 RX ADMIN — KETOROLAC TROMETHAMINE 15 MG: 30 INJECTION, SOLUTION INTRAMUSCULAR at 14:30

## 2022-11-02 NOTE — ANESTHESIA PREPROCEDURE EVALUATION
Procedure:  APPENDECTOMY LAPAROSCOPIC, possible open (N/A Abdomen)    Relevant Problems   Digestive   (+) Acute appendicitis      Other   (+) Obesity        Physical Exam    Airway    Mallampati score: II  TM Distance: >3 FB  Neck ROM: full     Dental   No notable dental hx     Cardiovascular      Pulmonary      Other Findings        Anesthesia Plan  ASA Score- 2     Anesthesia Type- general with ASA Monitors  Additional Monitors:   Airway Plan: ETT  Plan Factors-Exercise tolerance (METS): >4 METS  Chart reviewed  Existing labs reviewed  Patient summary reviewed  Patient is not a current smoker  Induction- intravenous  Postoperative Plan- Plan for postoperative opioid use  Planned trial extubation    Informed Consent- Anesthetic plan and risks discussed with patient  I personally reviewed this patient with the CRNA  Discussed and agreed on the Anesthesia Plan with the CRNA  Rufus Ramirez

## 2022-11-02 NOTE — ED PROVIDER NOTES
History  Chief Complaint   Patient presents with   • Abdominal Pain     Pt has had abdominal pain for the past few days that happened suddenly, pain is in her lower abdomen that radiates to right side  Pt is also having some lower back pain  Denies N/V/D     22F presents to the emergency department for abdominal pain  She reports over the past few days she has had constant right-sided abdominal cramping that waxes and wanes  She denies fevers, nausea, vomiting, or diarrhea  She denies dysuria or hematuria, or new vaginal bleeding or discharge  She had a vaginal delivery 2 months ago, she denies history of abdominal surgeries  Prior to Admission Medications   Prescriptions Last Dose Informant Patient Reported? Taking?    Prenatal MV-Min-Fe Fum-FA-DHA (PRENATAL 1 PO)  Self Yes No   Sig: Take by mouth   Patient not taking: Reported on 10/3/2022   acetaminophen (TYLENOL) 325 mg tablet  Self Yes No   Sig: Take 650 mg by mouth every 6 (six) hours as needed for mild pain   Patient not taking: Reported on 10/3/2022   benzocaine-menthol-lanolin-aloe (DERMOPLAST) 20-0 5 % topical spray   No No   Sig: Apply 1 application topically every 6 (six) hours as needed for mild pain   Patient not taking: Reported on 10/3/2022   docusate sodium (COLACE) 100 mg capsule   No No   Sig: Take 1 capsule (100 mg total) by mouth 2 (two) times a day   Patient not taking: Reported on 10/3/2022   hydrocortisone 1 % cream   No No   Sig: Apply 1 application topically daily as needed for irritation   Patient not taking: Reported on 10/3/2022   ibuprofen (MOTRIN) 600 mg tablet   No No   Sig: Take 1 tablet (600 mg total) by mouth every 6 (six) hours   Patient not taking: Reported on 10/3/2022   norethindrone-ethinyl estradiol (Junel FE 1/20) 1-20 MG-MCG per tablet   No No   Sig: Take 1 tablet by mouth daily   witch hazel-glycerin (TUCKS) topical pad   No No   Sig: Apply 1 pad topically every 4 (four) hours as needed for irritation   Patient not taking: Reported on 10/3/2022      Facility-Administered Medications: None       Past Medical History:   Diagnosis Date   • Allergic rhinitis    • Eczema    • Kidney stone    • Varicella     immunized       Past Surgical History:   Procedure Laterality Date   • WISDOM TOOTH EXTRACTION         Family History   Problem Relation Age of Onset   • No Known Problems Mother    • No Known Problems Father    • No Known Problems Brother    • No Known Problems Brother    • Mental illness Neg Hx    • Substance Abuse Neg Hx      I have reviewed and agree with the history as documented  E-Cigarette/Vaping   • E-Cigarette Use Former User    • Quit Date 6/15/21      E-Cigarette/Vaping Substances   • Nicotine Yes    • THC No    • CBD No    • Flavoring No    • Other No    • Unknown No      Social History     Tobacco Use   • Smoking status: Former Smoker     Packs/day: 0 25     Years: 5 00     Pack years: 1 25     Types: Cigarettes     Quit date:      Years since quittin 8   • Smokeless tobacco: Never Used   • Tobacco comment: vapes   Vaping Use   • Vaping Use: Former   • Quit date: 6/15/2021   • Substances: Nicotine   Substance Use Topics   • Alcohol use: Not Currently     Comment: occ   • Drug use: No        Review of Systems   Constitutional: Negative for fever  Respiratory: Negative for shortness of breath  Cardiovascular: Negative for chest pain  Gastrointestinal: Positive for abdominal pain  Negative for blood in stool, diarrhea, nausea and vomiting  Genitourinary: Negative for dysuria, hematuria, vaginal bleeding and vaginal discharge  All other systems reviewed and are negative        Physical Exam  ED Triage Vitals [22 0450]   Temperature Pulse Respirations Blood Pressure SpO2   98 4 °F (36 9 °C) 83 18 120/82 98 %      Temp Source Heart Rate Source Patient Position - Orthostatic VS BP Location FiO2 (%)   Oral Monitor Lying Right arm --      Pain Score       6             Orthostatic Vital Signs  Vitals:    11/02/22 0450   BP: 120/82   Pulse: 83   Patient Position - Orthostatic VS: Lying       Physical Exam  Vitals and nursing note reviewed  Constitutional:       General: She is not in acute distress  Appearance: She is not ill-appearing  HENT:      Head: Normocephalic  Mouth/Throat:      Mouth: Mucous membranes are moist       Pharynx: Oropharynx is clear  Eyes:      Pupils: Pupils are equal, round, and reactive to light  Cardiovascular:      Rate and Rhythm: Normal rate and regular rhythm  Heart sounds: Normal heart sounds  No murmur heard  Pulmonary:      Effort: Pulmonary effort is normal  No respiratory distress  Breath sounds: Normal breath sounds  No wheezing, rhonchi or rales  Abdominal:      General: Abdomen is flat  There is no distension  Palpations: Abdomen is soft  Tenderness: There is generalized abdominal tenderness and tenderness in the right lower quadrant  There is no right CVA tenderness, left CVA tenderness, guarding or rebound  Negative signs include Almonte's sign  Musculoskeletal:      Right lower leg: No edema  Left lower leg: No edema  Skin:     General: Skin is warm and dry  Neurological:      Mental Status: She is alert           ED Medications  Medications   sodium chloride 0 9 % bolus 1,000 mL (1,000 mL Intravenous New Bag 11/2/22 0536)   ketorolac (TORADOL) injection 15 mg (15 mg Intravenous Given 11/2/22 0536)   iohexol (OMNIPAQUE) 350 MG/ML injection (SINGLE-DOSE) 100 mL (100 mL Intravenous Given 11/2/22 0647)       Diagnostic Studies  Results Reviewed     Procedure Component Value Units Date/Time    hCG, qualitative pregnancy [[id number]]  (Normal) Collected: 11/02/22 0536    Lab Status: Final result Specimen: Blood from Arm, Right Updated: 11/02/22 0638     Preg, Serum Negative    Comprehensive metabolic panel [[id number]]  (Abnormal) Collected: 11/02/22 0536    Lab Status: Final result Specimen: Blood from Arm, Right Updated: 11/02/22 8011     Sodium 137 mmol/L      Potassium 3 6 mmol/L      Chloride 108 mmol/L      CO2 24 mmol/L      ANION GAP 5 mmol/L      BUN 8 mg/dL      Creatinine 0 87 mg/dL      Glucose 93 mg/dL      Calcium 9 1 mg/dL      Corrected Calcium 9 7 mg/dL      AST 12 U/L      ALT 17 U/L      Alkaline Phosphatase 65 U/L      Total Protein 7 7 g/dL      Albumin 3 3 g/dL      Total Bilirubin 0 47 mg/dL      eGFR 94 ml/min/1 73sq m     Narrative:      National Kidney Disease Foundation guidelines for Chronic Kidney Disease (CKD):   •  Stage 1 with normal or high GFR (GFR > 90 mL/min/1 73 square meters)  •  Stage 2 Mild CKD (GFR = 60-89 mL/min/1 73 square meters)  •  Stage 3A Moderate CKD (GFR = 45-59 mL/min/1 73 square meters)  •  Stage 3B Moderate CKD (GFR = 30-44 mL/min/1 73 square meters)  •  Stage 4 Severe CKD (GFR = 15-29 mL/min/1 73 square meters)  •  Stage 5 End Stage CKD (GFR <15 mL/min/1 73 square meters)  Note: GFR calculation is accurate only with a steady state creatinine    Lipase [[id number]]  (Normal) Collected: 11/02/22 0536    Lab Status: Final result Specimen: Blood from Arm, Right Updated: 11/02/22 0637     Lipase 101 u/L     CBC and differential [[id number]]  (Abnormal) Collected: 11/02/22 0536    Lab Status: Final result Specimen: Blood from Arm, Right Updated: 11/02/22 0613     WBC 10 81 Thousand/uL      RBC 3 81 Million/uL      Hemoglobin 11 6 g/dL      Hematocrit 35 6 %      MCV 93 fL      MCH 30 4 pg      MCHC 32 6 g/dL      RDW 12 3 %      MPV 9 8 fL      Platelets 210 Thousands/uL      nRBC 0 /100 WBCs      Neutrophils Relative 67 %      Immat GRANS % 0 %      Lymphocytes Relative 26 %      Monocytes Relative 6 %      Eosinophils Relative 1 %      Basophils Relative 0 %      Neutrophils Absolute 7 18 Thousands/µL      Immature Grans Absolute 0 04 Thousand/uL      Lymphocytes Absolute 2 78 Thousands/µL      Monocytes Absolute 0 66 Thousand/µL      Eosinophils Absolute 0 11 Thousand/µL Basophils Absolute 0 04 Thousands/µL                  CT abdomen pelvis with contrast    (Results Pending)         Procedures  Procedures      ED Course                                       MDM  Number of Diagnoses or Management Options  Diagnosis management comments: 22F presents to the emergency department for abdominal pain  Workup including vital signs, physical exam, labs,, pregnancy test, CT abdomen pelvis  Pregnancy test negative, labs largely unremarkable  Disposition  Final diagnoses:   None     ED Disposition     None      Follow-up Information    None         Patient's Medications   Discharge Prescriptions    No medications on file     No discharge procedures on file  PDMP Review     None           ED Provider  Attending physically available and evaluated Chad Chen  MARYJO managed the patient along with the ED Attending      Electronically Signed by appendicitis without complication  2   Trace pleural effusions  3   Punctate nonobstructing left renal calculus  I personally discussed this study with Zaina Johnson on 11/2/2022 at 9:46 AM                   Workstation performed: [id number]               Procedures  Procedures      ED Course                                       MDM  Number of Diagnoses or Management Options  Abdominal pain  Acute appendicitis  Diagnosis management comments: 22F presents to the emergency department for abdominal pain  Workup including vital signs, physical exam, labs, pregnancy test, CT abdomen pelvis  Pregnancy test negative, labs largely unremarkable  Patient signed out at end of shift, per chart review CT with evidence of appendicitis, patient admitted to surgery service  Disposition  Final diagnoses:   Acute appendicitis   Abdominal pain     Time reflects when diagnosis was documented in both MDM as applicable and the Disposition within this note     Time User Action Codes Description Comment    11/2/2022 10:04 AM Kiet Cortess Add [K35 80] Acute appendicitis     11/2/2022 10:04 AM Kiet Cortess Add [R10 9] Abdominal pain     11/2/2022  2:24 PM Kobi Olp Modify [K35 80] Acute appendicitis       ED Disposition     ED Disposition   Admit    Condition   Stable    Date/Time   Wed Nov 2, 2022 11:24 AM    Comment   Case was discussed with red surgery and the patient's admission status was agreed to be Admission Status: observation status to the service of Dr Derick Gongora  Follow-up Information     Follow up With Specialties Details Why Contact Info Additional Information    [facility] Follow up Please follow up in the [address] [place] [address] as scheduled on 11/17/2022 at 11:00 AM for postoperative re-evaluation  Please call with any questions or concerns   [place] [address] [facility] [place], [address], [place], [facility]    Leila Killian MD Family Medicine Schedule an appointment as soon as possible for a visit in 2 week(s) Please call for appointment the next 2 weeks to review your hospital encounter  [address]             Discharge Medication List as of 11/3/2022  4:34 PM      START taking these medications    Details   acetaminophen (TYLENOL) 325 mg tablet Take 2 tablets (650 mg total) by mouth every 4 (four) hours as needed for mild pain Do not exceed 4 g daily  , Starting Thu 11/3/2022, Until Sat 12/3/2022 at 2359, No Print      ibuprofen (MOTRIN) 200 mg tablet Take 2 tablets (400 mg total) by mouth every 6 (six) hours as needed for mild pain Take with food , Starting Thu 11/3/2022, No Print      oxyCODONE (ROXICODONE) 5 immediate release tablet Take 0 5-1 tablets (2 5-5 mg total) by mouth every 4 (four) hours as needed for moderate pain or severe pain for up to 3 days Max Daily Amount: 30 mg, Starting Thu 11/3/2022, Until Sun 11/6/2022 at 2359, Normal         CONTINUE these medications which have NOT CHANGED    Details   norethindrone-ethinyl estradiol (Junel FE 1/20) 1-20 MG-MCG per tablet Take 1 tablet by mouth daily, Starting Mon 10/3/2022, Until Mon 12/26/2022, Normal           Outpatient Discharge Orders   Discharge Diet     Lifting restrictions     No strenuous exercise     Shower Daily     No driving until     Call provider for:  persistent nausea or vomiting     Call provider for:  severe uncontrolled pain     Call provider for:  redness, tenderness, or signs of infection (pain, swelling, redness, odor or green/yellow discharge around incision site)     Call provider for: active or persistent bleeding     Call provider for:  extreme fatigue     Call provider for:       PDMP Review       Value Time User    PDMP Reviewed  Yes 11/3/2022  3:53 PM Carlos Manuel Brannon PA-C           ED Provider  Attending physically available and evaluated Ricky Bray  MARYJO managed the patient along with the ED Attending      Electronically Signed by         Pearlene Duverney, MD  11/09/22 5495

## 2022-11-02 NOTE — ED ATTENDING ATTESTATION
11/2/2022  Brittnee SIBLEY Asa, MD, saw and evaluated the patient  I have discussed the patient with the resident/non-physician practitioner and agree with the resident's/non-physician practitioner's findings, Plan of Care, and MDM as documented in the resident's/non-physician practitioner's note, except where noted  All available labs and Radiology studies were reviewed  I was present for key portions of any procedure(s) performed by the resident/non-physician practitioner and I was immediately available to provide assistance  At this point I agree with the current assessment done in the Emergency Department  I have conducted an independent evaluation of this patient a history and physical is as follows:    ED Course  ED Course as of 11/02/22 0524   Wed Nov 02, 2022   0509 Per resident h&p 21 YO F presents with RLQ pain O: abd tender I/P CTAP; hCG; CBC; CMP     Emergency Department Note- Merline Cortes 20 y o  female MRN: [id number]    Unit/Bed#: ED 27 Encounter: [id number]    Merline Cortes is a 20 y o  female who presents with   Chief Complaint   Patient presents with   • Abdominal Pain     Pt has had abdominal pain for the past few days that happened suddenly, pain is in her lower abdomen that radiates to right side  Pt is also having some lower back pain  Denies N/V/D         History of Present Illness   HPI:  Merline Cortes is a 20 y o  female who presents for evaluation of:  Right-sided lower abdominal pain over the last several days that is worse this morning  Patient notes some associated lower back pain as well  Patient denies associated nausea, vomiting, diarrhea  Pain is aching and moderate in intensity  No LMP recorded  Review of Systems   Constitutional: Negative for fatigue and fever  HENT: Negative for congestion and sore throat  Respiratory: Negative for cough and shortness of breath  Cardiovascular: Negative for chest pain and palpitations     Gastrointestinal: Positive for abdominal pain  Negative for nausea and vomiting  Genitourinary: Negative for flank pain and frequency  Neurological: Negative for light-headedness and headaches  Psychiatric/Behavioral: Negative for dysphoric mood and hallucinations  All other systems reviewed and are negative  Historical Information   Past Medical History:   Diagnosis Date   • Allergic rhinitis    • Eczema    • Kidney stone    • Varicella     immunized     Past Surgical History:   Procedure Laterality Date   • WISDOM TOOTH EXTRACTION       Social History   Social History     Substance and Sexual Activity   Alcohol Use Not Currently    Comment: occ     Social History     Substance and Sexual Activity   Drug Use No     Social History     Tobacco Use   Smoking Status Former Smoker   • Packs/day: 0 25   • Years: 5 00   • Pack years: 1 25   • Types: Cigarettes   • Quit date:    • Years since quittin 8   Smokeless Tobacco Never Used   Tobacco Comment    vapes     Family History:   Family History   Problem Relation Age of Onset   • No Known Problems Mother    • No Known Problems Father    • No Known Problems Brother    • No Known Problems Brother    • Mental illness Neg Hx    • Substance Abuse Neg Hx        Meds/Allergies   PTA meds:   Prior to Admission Medications   Prescriptions Last Dose Informant Patient Reported? Taking?    Prenatal MV-Min-Fe Fum-FA-DHA (PRENATAL 1 PO)  Self Yes No   Sig: Take by mouth   Patient not taking: Reported on 10/3/2022   acetaminophen (TYLENOL) 325 mg tablet  Self Yes No   Sig: Take 650 mg by mouth every 6 (six) hours as needed for mild pain   Patient not taking: Reported on 10/3/2022   benzocaine-menthol-lanolin-aloe (DERMOPLAST) 20-0 5 % topical spray   No No   Sig: Apply 1 application topically every 6 (six) hours as needed for mild pain   Patient not taking: Reported on 10/3/2022   docusate sodium (COLACE) 100 mg capsule   No No   Sig: Take 1 capsule (100 mg total) by mouth 2 (two) times a day Patient not taking: Reported on 10/3/2022   hydrocortisone 1 % cream   No No   Sig: Apply 1 application topically daily as needed for irritation   Patient not taking: Reported on 10/3/2022   ibuprofen (MOTRIN) 600 mg tablet   No No   Sig: Take 1 tablet (600 mg total) by mouth every 6 (six) hours   Patient not taking: Reported on 10/3/2022   norethindrone-ethinyl estradiol (Junel FE 1/20) 1-20 MG-MCG per tablet   No No   Sig: Take 1 tablet by mouth daily   witch hazel-glycerin (TUCKS) topical pad   No No   Sig: Apply 1 pad topically every 4 (four) hours as needed for irritation   Patient not taking: Reported on 10/3/2022      Facility-Administered Medications: None     No Known Allergies    Objective   First Vitals:   Blood Pressure: 120/82 (11/02/22 0450)  Pulse: 83 (11/02/22 0450)  Temperature: 98 4 °F (36 9 °C) (11/02/22 0450)  Temp Source: Oral (11/02/22 0450)  Respirations: 18 (11/02/22 0450)  SpO2: 98 % (11/02/22 0450)    Current Vitals:   Blood Pressure: 120/82 (11/02/22 0450)  Pulse: 83 (11/02/22 0450)  Temperature: 98 4 °F (36 9 °C) (11/02/22 0450)  Temp Source: Oral (11/02/22 0450)  Respirations: 18 (11/02/22 0450)  SpO2: 98 % (11/02/22 0450)    No intake or output data in the 24 hours ending 11/02/22 0524    Invasive Devices  Report    None                 Physical Exam  Vitals and nursing note reviewed  Constitutional:       General: She is not in acute distress  Appearance: Normal appearance  She is well-developed  HENT:      Head: Normocephalic and atraumatic  Right Ear: External ear normal       Left Ear: External ear normal       Nose: Nose normal       Mouth/Throat:      Pharynx: No oropharyngeal exudate  Eyes:      Conjunctiva/sclera: Conjunctivae normal       Pupils: Pupils are equal, round, and reactive to light  Cardiovascular:      Rate and Rhythm: Normal rate and regular rhythm  Pulmonary:      Effort: Pulmonary effort is normal  No respiratory distress     Abdominal: General: Abdomen is flat  There is no distension  Palpations: Abdomen is soft  Tenderness: There is abdominal tenderness in the right lower quadrant  Musculoskeletal:         General: No deformity  Normal range of motion  Cervical back: Normal range of motion and neck supple  Skin:     General: Skin is warm and dry  Capillary Refill: Capillary refill takes less than 2 seconds  Neurological:      General: No focal deficit present  Mental Status: She is alert and oriented to person, place, and time  Mental status is at baseline  Coordination: Coordination normal    Psychiatric:         Mood and Affect: Mood normal          Behavior: Behavior normal          Thought Content: Thought content normal          Judgment: Judgment normal            Medical Decision Makin  Acute right lower quadrant abdominal pain:  CT abdomen and pelvis; CBC; CMP; urine hCG; urinalysis  No results found for this or any previous visit (from the past 36 hour(s))  CT abdomen pelvis with contrast    (Results Pending)         Portions of the record may have been created with voice recognition software  Occasional wrong word or "sound a like" substitutions may have occurred due to the inherent limitations of voice recognition software  Read the chart carefully and recognize, using context, where substitutions have occurred          Critical Care Time  Procedures

## 2022-11-02 NOTE — PROGRESS NOTES
Attempted to call mother Frances Almanzacal to update regarding surgery, no answer  Will try again       Shante Hernandez, DO

## 2022-11-02 NOTE — CASE MANAGEMENT
Case Management ED Discharge Planning Note    Patient name Osbaldo Hoff  Location ED 27/ED 27 MRN [id number]  : 2000 Date 2022        OBJECTIVE:  Predictive Model Details         77% Factor Value    Risk of Hospital Admission or ED Visit Model Number of ED Visits 4     Has Medicaid Yes     Is in Relationship No     Number of Hospitalizations 1     Has Depression Yes       Chief Complaint:   Patient Class: Emergency  Preferred Pharmacy:   [address], [place] - [address] [facility] [address] [facility] [address] [facility] 38 [address] [facility]  Phone: [phone] Fax: [phone]    [id number], [address] [facility] [address]  Phone: [phone] Fax: [address] [facility] [address] ,  [address] [facility]   Phone: [phone] Fax: [phone]    Primary Care Provider: No primary care provider on file  Primary Insurance: CAPITAL  Secondary Insurance: [facility]    ED Discharge Details:    Discharge planning discussed with[de-identified] Patient  Freedom of Choice: Yes     CM contacted family/caregiver?: No- see comments (declined call to family)     Other Referral/Resources/Interventions Provided:  Interventions: PCP  Referral Comments: CM met with pt at bedside  Initially pt was asleep but did wake up  Pt confirmed that she does not have a PCP at this time  Pt accepted PCP information and states that she does not need CM to call or assist with scheduling      Treatment Team Recommendation: Home  Discharge Destination Plan[de-identified] Home     Transport at Discharge : Self

## 2022-11-02 NOTE — H&P
H&P - General Surgery  Socorro Wen 20 y o  female MRN: [id number]  Unit/Bed#: ED 27 Encounter: [id number]        Assessment/Plan     Assessment:  Ms Radha Sandoval is a 19 yo female who presents with RLQ pain in the setting of acute uncomplicated appendicitis  Plan:  -NPO  -IV fluids  -IV ABX  -PRN pain meds  -PRN anti-nausea  -DVT prophylaxis  -Plan to proceed to OR for lap appy today    History of Present Illness     HPI:  Radha Sandoval is a 20 y o  female who presents RLQ pain  The patient states that she has had abdominal pain since Monday  She states that the pain is right-sided, waxes and wanes  She mentioned that the pain feels like contractions and at times feels sharp in nature  She has tried heat, Tylenol, ibuprofen without any sort of pain relief  Pain is currently a 3/10  She denies nausea/vomting/diarrhea  Denies fevers/chills  Endorses mild SOB  Review of Systems   Constitutional: Positive for fever  Negative for chills  HENT: Negative for ear pain and sore throat  Eyes: Negative for pain and visual disturbance  Respiratory: Positive for shortness of breath  Negative for cough  Cardiovascular: Negative for chest pain and palpitations  Gastrointestinal: Positive for abdominal pain  Negative for constipation, diarrhea, nausea and vomiting  Genitourinary: Negative for dysuria and hematuria  Musculoskeletal: Negative for arthralgias and back pain  Skin: Negative for color change and rash  Neurological: Negative for seizures and syncope  All other systems reviewed and are negative        Historical Information   Past Medical History:   Diagnosis Date   • Allergic rhinitis    • Eczema    • Kidney stone    • Varicella     immunized     Past Surgical History:   Procedure Laterality Date   • WISDOM TOOTH EXTRACTION       Social History   Social History     Substance and Sexual Activity   Alcohol Use Not Currently    Comment: occ     Social History     Substance and Sexual Activity   Drug Use No     Social History     Tobacco Use   Smoking Status Former Smoker   • Packs/day: 0 25   • Years: 5 00   • Pack years: 1 25   • Types: Cigarettes   • Quit date:    • Years since quittin 8   Smokeless Tobacco Never Used   Tobacco Comment    vapes     Family History: non-contributory    Meds/Allergies   all medications and allergies reviewed  No Known Allergies    Objective   First Vitals:   Blood Pressure: 120/82 (22 0450)  Pulse: 83 (22 0450)  Temperature: 98 4 °F (36 9 °C) (22 0450)  Temp Source: Oral (22 0450)  Respirations: 18 (22 0450)  SpO2: 98 % (22 0450)    Current Vitals:   Blood Pressure: 149/88 (22 1015)  Pulse: 82 (22 1015)  Temperature: 98 4 °F (36 9 °C) (22 0450)  Temp Source: Oral (22 0450)  Respirations: 18 (22 1015)  SpO2: 99 % (22 1015)      Intake/Output Summary (Last 24 hours) at 2022 1130  Last data filed at 2022 0700  Gross per 24 hour   Intake 1000 ml   Output --   Net 1000 ml       Invasive Devices  Report    Peripheral Intravenous Line  Duration           Peripheral IV 22 Proximal;Right;Ventral (anterior) Forearm <1 day                Physical Exam:  General: No acute distress  Neuro: Alert, oriented to person and place  Eyes: Extraocular movements intact  CV: Well perfused, regular rate and rhythm  Lungs: Normal work of breathing, no increased respiratory effort  Abdomen: soft, tender to palpation in RLQ  + rebound tenderness  Negative obturator sign  Extremities: No edema, clubbing or cyanosis  Skin: Warm, dry  Lymph: No palpable lymph nodes  Psych: Normal mentation      Lab Results: I have personally reviewed pertinent lab results        Results Reviewed     Procedure Component Value Units Date/Time    Platelet count [[id number]]     Lab Status: No result Specimen: Blood     hCG, qualitative pregnancy [[id number]]  (Normal) Collected: 22 0536    Lab Status: Final result Specimen: Blood from Arm, Right Updated: 11/02/22 0638     Preg, Serum Negative    Comprehensive metabolic panel [[id number]]  (Abnormal) Collected: 11/02/22 0536    Lab Status: Final result Specimen: Blood from Arm, Right Updated: 11/02/22 7269     Sodium 137 mmol/L      Potassium 3 6 mmol/L      Chloride 108 mmol/L      CO2 24 mmol/L      ANION GAP 5 mmol/L      BUN 8 mg/dL      Creatinine 0 87 mg/dL      Glucose 93 mg/dL      Calcium 9 1 mg/dL      Corrected Calcium 9 7 mg/dL      AST 12 U/L      ALT 17 U/L      Alkaline Phosphatase 65 U/L      Total Protein 7 7 g/dL      Albumin 3 3 g/dL      Total Bilirubin 0 47 mg/dL      eGFR 94 ml/min/1 73sq m     Narrative:      National Kidney Disease Foundation guidelines for Chronic Kidney Disease (CKD):   •  Stage 1 with normal or high GFR (GFR > 90 mL/min/1 73 square meters)  •  Stage 2 Mild CKD (GFR = 60-89 mL/min/1 73 square meters)  •  Stage 3A Moderate CKD (GFR = 45-59 mL/min/1 73 square meters)  •  Stage 3B Moderate CKD (GFR = 30-44 mL/min/1 73 square meters)  •  Stage 4 Severe CKD (GFR = 15-29 mL/min/1 73 square meters)  •  Stage 5 End Stage CKD (GFR <15 mL/min/1 73 square meters)  Note: GFR calculation is accurate only with a steady state creatinine    Lipase [[id number]]  (Normal) Collected: 11/02/22 0536    Lab Status: Final result Specimen: Blood from Arm, Right Updated: 11/02/22 0637     Lipase 101 u/L     CBC and differential [[id number]]  (Abnormal) Collected: 11/02/22 0536    Lab Status: Final result Specimen: Blood from Arm, Right Updated: 11/02/22 0613     WBC 10 81 Thousand/uL      RBC 3 81 Million/uL      Hemoglobin 11 6 g/dL      Hematocrit 35 6 %      MCV 93 fL      MCH 30 4 pg      MCHC 32 6 g/dL      RDW 12 3 %      MPV 9 8 fL      Platelets 558 Thousands/uL      nRBC 0 /100 WBCs      Neutrophils Relative 67 %      Immat GRANS % 0 %      Lymphocytes Relative 26 %      Monocytes Relative 6 %      Eosinophils Relative 1 %      Basophils Relative 0 % Neutrophils Absolute 7 18 Thousands/µL      Immature Grans Absolute 0 04 Thousand/uL      Lymphocytes Absolute 2 78 Thousands/µL      Monocytes Absolute 0 66 Thousand/µL      Eosinophils Absolute 0 11 Thousand/µL      Basophils Absolute 0 04 Thousands/µL           Imaging: I have personally reviewed pertinent reports  CT abdomen pelvis with contrast    Result Date: 11/2/2022  Impression: 1  Acute appendicitis without complication  2   Trace pleural effusions  3   Punctate nonobstructing left renal calculus  I personally discussed this study with Azeb Aguilar on 11/2/2022 at 9:46 AM  Workstation performed: [id number]     EKG, Pathology, and Other Studies: I have personally reviewed pertinent reports        Code Status: Level 1 - Full Code  Advance Directive and Living Will:      Power of :    POLST:      Giovanni Redmond MD  General Surgery Resident

## 2022-11-02 NOTE — ANESTHESIA POSTPROCEDURE EVALUATION
Post-Op Assessment Note    CV Status:  Stable    Pain management: adequate     Mental Status:  Awake   Hydration Status:  Stable   PONV Controlled:  Controlled   Airway Patency:  Patent      Post Op Vitals Reviewed: Yes      Staff: Anesthesiologist, CRNA         No complications documented      BP   116/72   Temp   97 4   Pulse  72   Resp   18   SpO2   96

## 2022-11-02 NOTE — CASE MANAGEMENT
Case Management ED Assessment    Patient name Dorian Spine  Location ED 27/ED 27 MRN [id number]  : 2000 Date 2022        OBJECTIVE:  Predictive Model Details         77% Factor Value    Risk of Hospital Admission or ED Visit Model Number of ED Visits 4     Has Medicaid Yes     Is in Relationship No     Number of Hospitalizations 1     Has Depression Yes       Chief Complaint:   Patient Class: Emergency  Preferred Pharmacy:   [address], [facility] [address] [facility] [address] [facility] [address] [facility]  Phone: [phone] Fax: [phone]    [id number], [address]  [address] [facility]   Phone: [phone] Fax: [address] [facility] [address] ,  [address] [facility] [address]  Phone: [phone] Fax: [phone]    Primary Care Provider: No primary care provider on file  Primary Insurance: CAPITAL  Secondary Insurance: [employer] [facility]    ED ASSESSMENT:  Active Health Care Proxies    There are no active Health Care Proxies on file         Readmission Root Cause  30 Day Readmission: No    Outpatient Care Information  Have you seen a doctor in the last year?: Yes  Specify which physician group(s) you have seen in the past year : Other  Specialist(s) seen outside of [facility] or [facility]: OB/GYN    Prescribed Medications Prior to Admission/ED Visit  Has patient been prescribed medications (prior to this ED visit/admission)?: Yes  Any difficulty obtaining medications?: No  Has the patient been taking all prescribed medication(s)?: Yes  Does the patient have difficulty affording medication(s)?: No    Patient Information  Admitted from[de-identified] Home  Mental Status: Alert  During Assessment patient was accompanied by: Not accompanied during assessment  Assessment information provided by[de-identified] Patient  Primary Caregiver: Self  Support Systems: Self, Spouse/significant other  [place] of Residence: [address] [facility] do you live in?: [place]  In the last 12 months, was there a time when you were not able to pay the mortgage or rent on time?: No  In the last 12 months, how many places have you lived?: 1  In the last 12 months, was there a time when you did not have a steady place to sleep or slept in a shelter (including now)?: No  Homeless/housing insecurity resource given?: No  Living Arrangements: Lives w/ Spouse/significant other    Patient Information Continued  Income Source: Employed  Does patient have prescription coverage?: Yes  Within the past 12 months, you worried that your food would run out before you got the money to buy more : Never true  Within the past 12 months, the food you bought just didn't last and you didn't have money to get more : Never true  Food insecurity resource given?: No  Does patient receive dialysis treatments?: No  Does patient have a history of substance abuse?: No  Does patient have a history of Mental Health Diagnosis?: No    Food and Transportation  Ask patient:  How would you describe your eating habits?: Good

## 2022-11-02 NOTE — Clinical Note
Case was discussed with red surgery and the patient's admission status was agreed to be Admission Status: inpatient status to the service of Dr Anna Gill

## 2022-11-02 NOTE — ED CARE HANDOFF
Emergency Department Sign Out Note        Sign out and transfer of care from Dr Ace Cody  See Separate Emergency Department note  The patient, Nelly Weir, was evaluated by the previous provider for abdominal pain  Workup Completed:  Labs    ED Course / Workup Pending (followup):  CT reads                                  ED Course as of 11/05/22 1719   Wed Nov 02, 2022   0704 SO: Abd pain; Pending CT read; DC if normal   0940 Still awaiting CT read  Rads called to check on status  1554 Per rads, acute appendicitis  Will call red surgery  1001 Pt updated on results and plan for red surgery evaluation  Pt also reporting continued pain, will redose pain meds  1007 Surg to see  Procedures  MDM  Number of Diagnoses or Management Options  Abdominal pain  Acute appendicitis  Diagnosis management comments: Plan for admit pt to read surgery  Pt discussed with admitting team and admission orders placed  Pt admitted without incident  Amount and/or Complexity of Data Reviewed  Clinical lab tests: reviewed  Tests in the radiology section of CPT®: reviewed            Disposition  Final diagnoses:   Acute appendicitis   Abdominal pain     Time reflects when diagnosis was documented in both MDM as applicable and the Disposition within this note     Time User Action Codes Description Comment    11/2/2022 10:04 AM [email or web address] Ellen Add [K35 80] Acute appendicitis     11/2/2022 10:04 AM [email or web address] Ellen Add [R10 9] Abdominal pain     11/2/2022  2:24 PM Chilango López Modify [K35 80] Acute appendicitis       ED Disposition     ED Disposition   Admit    Condition   Stable    Date/Time   Wed Nov 2, 2022 11:24 AM    Comment   Case was discussed with red surgery and the patient's admission status was agreed to be Admission Status: observation status to the service of Dr Gagan Hickey             Follow-up Information     Follow up With Specialties Details Why Contact Info Additional Information    [facility] General Surgery Follow up Please follow up in the [address] [facility] as scheduled on 11/17/2022 at 11:00 AM for postoperative re-evaluation  Please call with any questions or concerns  [address] [facility] [address] [facility] [address] [facility] [address] [facility], [facility], [address] [facility], [facility]    Princess Ian MD Family Medicine Schedule an appointment as soon as possible for a visit in 2 week(s) Please call for appointment the next 2 weeks to review your hospital encounter  [address]           Discharge Medication List as of 11/3/2022  4:34 PM      START taking these medications    Details   acetaminophen (TYLENOL) 325 mg tablet Take 2 tablets (650 mg total) by mouth every 4 (four) hours as needed for mild pain Do not exceed 4 g daily  , Starting u 11/3/2022, Until Sat 12/3/2022 at 2359, No Print      ibuprofen (MOTRIN) 200 mg tablet Take 2 tablets (400 mg total) by mouth every 6 (six) hours as needed for mild pain Take with food , Starting u 11/3/2022, No Print      oxyCODONE (ROXICODONE) 5 immediate release tablet Take 0 5-1 tablets (2 5-5 mg total) by mouth every 4 (four) hours as needed for moderate pain or severe pain for up to 3 days Max Daily Amount: 30 mg, Starting Thu 11/3/2022, Until Sun 11/6/2022 at 2359, Normal         CONTINUE these medications which have NOT CHANGED    Details   norethindrone-ethinyl estradiol (Junel FE 1/20) 1-20 MG-MCG per tablet Take 1 tablet by mouth daily, Starting Mon 10/3/2022, Until Mon 12/26/2022, Normal           Outpatient Discharge Orders   Discharge Diet     Lifting restrictions     No strenuous exercise     Shower Daily     No driving until     Call provider for:  persistent nausea or vomiting     Call provider for:  severe uncontrolled pain     Call provider for:  redness, tenderness, or signs of infection (pain, swelling, redness, odor or green/yellow discharge around incision site)     Call provider for: active or persistent bleeding     Call provider for:  extreme fatigue     Call provider for:          ED Provider  Electronically Signed by     Jackeline Diehl MD  11/05/22 [phone]

## 2022-11-02 NOTE — OP NOTE
OPERATIVE REPORT  PATIENT NAME: Clint Bedoya    :  2000  MRN: [id number]  Pt Location:  OR ROOM 03    SURGERY DATE: 2022    Surgeon(s) and Role:     * Henry Armando, DO - Primary     * Richard Hanks MD - [id number], MD - [address], DO - Assisting    Preop Diagnosis:  Acute appendicitis [K35 80]    Post-Op Diagnosis Codes:     * Acute appendicitis [K35 80]    Procedure(s) (LRB):  APPENDECTOMY LAPAROSCOPIC, possible open (N/A)    Specimen(s):  ID Type Source Tests Collected by Time Destination   1 : appendix Tissue Appendix TISSUE EXAM Henry Armando DO 2022 1424        Estimated Blood Loss:   Minimal    Drains:  [REMOVED] Urethral Catheter Latex 16 Fr  (Removed)   Number of days: 0       Anesthesia Type:   Choice    Operative Indications:  Acute appendicitis [K35 80]    Operative Findings:  Acute appendicitis with periappendiceal abscess    Complications:   None    Procedure and Technique:  Patient was brought into the operating room and identity and procedure were confirmed  Patient was placed supine on the operating room table and general anesthesia was induced  The abdomen was prepped and draped in the usual sterile fashion  Time-out was performed and all parties were in agreement  Local anesthetic was instilled below the umbilicus  Vertical skin incision was made using an 11 blade scalpel  Entrance to the abdomen was attempted using a Veress needle however this was unsuccessful so decision was made to enter at cannon's point  Local anesthetic was instilled a cannon's point  A 5 mm skin incision was made using an 11 blade scalpel  The abdomen was entered using Veress needle and pneumoperitoneum was established  Veress needle was exchanged for a 5 mm trocar using Optiview technique  The abdomen was inspected and it was ensured that there was no injury upon entry    An additional 5 mm trocar was placed in the umbilical incision under direct visualization  A 12 mm trocar was then placed in the left lower quadrant under direct visualization  The abdomen was inspected and the appendix was visualized in the right lower quadrant  There was acute inflammation with reactive fluid in the area  The appendix was able to be freed from surrounding inflammatory tissue bluntly using a grasper  It was then grasped and pulled upwards  A mesenteric window was then created using blunt dissection with a [place] grasper and suction   With manipulation purulence was encountered adjacent to the proximal 3rd of the appendix concerning for periappendiceal abscess  The base of the appendix appeared inflamed so decision was made to staple on to healthy cecum  Endo-SMOOTH stapler was used to divide the base of the appendix and tip of the cecum using a blue load  The appendiceal mesentery was then taken using an Endo-SMOOTH vascular load  The appendix was then placed into an Endo-Catch bag  The staple line was inspected and found to be intact and hemostatic  All reactive fluid in the right lower quadrant in pelvis was suctioned out  The appendix was then removed through the left lower quadrant incision  The fascial defect at the left lower quadrant incision was closed with a single interrupted suture using a suture Passer and 0 Vicryl suture  All skin incisions were then closed using 4-0 Monocryl subcuticular suture  The abdomen was then washed and dried and sterile Exofin was applied  The patient was then awakened in the operating room and returned to the PACU in stable condition having tolerated the procedure well  Dr Rosita Jain was present for the entire procedure      Patient Disposition:  PACU         SIGNATURE: Musa Cain MD  DATE: November 2, 2022  TIME: 2:56 PM

## 2022-11-03 VITALS
DIASTOLIC BLOOD PRESSURE: 69 MMHG | RESPIRATION RATE: 18 BRPM | TEMPERATURE: 97.6 F | HEART RATE: 51 BPM | SYSTOLIC BLOOD PRESSURE: 108 MMHG | OXYGEN SATURATION: 96 %

## 2022-11-03 LAB
ALBUMIN SERPL BCP-MCNC: 2.7 G/DL (ref 3.5–5)
ALP SERPL-CCNC: 66 U/L (ref 46–116)
ALT SERPL W P-5'-P-CCNC: 14 U/L (ref 12–78)
ANION GAP SERPL CALCULATED.3IONS-SCNC: 6 MMOL/L (ref 4–13)
AST SERPL W P-5'-P-CCNC: 12 U/L (ref 5–45)
BASOPHILS # BLD AUTO: 0 THOUSANDS/ÂΜL (ref 0–0.1)
BASOPHILS NFR BLD AUTO: 0 % (ref 0–1)
BILIRUB SERPL-MCNC: 0.33 MG/DL (ref 0.2–1)
BUN SERPL-MCNC: 6 MG/DL (ref 5–25)
CALCIUM ALBUM COR SERPL-MCNC: 10 MG/DL (ref 8.3–10.1)
CALCIUM SERPL-MCNC: 9 MG/DL (ref 8.3–10.1)
CHLORIDE SERPL-SCNC: 113 MMOL/L (ref 96–108)
CO2 SERPL-SCNC: 21 MMOL/L (ref 21–32)
CREAT SERPL-MCNC: 0.77 MG/DL (ref 0.6–1.3)
EOSINOPHIL # BLD AUTO: 0 THOUSAND/ÂΜL (ref 0–0.61)
EOSINOPHIL NFR BLD AUTO: 0 % (ref 0–6)
ERYTHROCYTE [DISTWIDTH] IN BLOOD BY AUTOMATED COUNT: 12.2 % (ref 11.6–15.1)
GFR SERPL CREATININE-BSD FRML MDRD: 109 ML/MIN/1.73SQ M
GLUCOSE SERPL-MCNC: 148 MG/DL (ref 65–140)
HCT VFR BLD AUTO: 37.1 % (ref 34.8–46.1)
HGB BLD-MCNC: 11.8 G/DL (ref 11.5–15.4)
IMM GRANULOCYTES # BLD AUTO: 0.03 THOUSAND/UL (ref 0–0.2)
IMM GRANULOCYTES NFR BLD AUTO: 0 % (ref 0–2)
LYMPHOCYTES # BLD AUTO: 1.37 THOUSANDS/ÂΜL (ref 0.6–4.47)
LYMPHOCYTES NFR BLD AUTO: 16 % (ref 14–44)
MAGNESIUM SERPL-MCNC: 2.1 MG/DL (ref 1.6–2.6)
MCH RBC QN AUTO: 29.4 PG (ref 26.8–34.3)
MCHC RBC AUTO-ENTMCNC: 31.8 G/DL (ref 31.4–37.4)
MCV RBC AUTO: 93 FL (ref 82–98)
MONOCYTES # BLD AUTO: 0.44 THOUSAND/ÂΜL (ref 0.17–1.22)
MONOCYTES NFR BLD AUTO: 5 % (ref 4–12)
NEUTROPHILS # BLD AUTO: 6.91 THOUSANDS/ÂΜL (ref 1.85–7.62)
NEUTS SEG NFR BLD AUTO: 79 % (ref 43–75)
NRBC BLD AUTO-RTO: 0 /100 WBCS
PHOSPHATE SERPL-MCNC: 2.1 MG/DL (ref 2.7–4.5)
PLATELET # BLD AUTO: 322 THOUSANDS/UL (ref 149–390)
PMV BLD AUTO: 9.7 FL (ref 8.9–12.7)
POTASSIUM SERPL-SCNC: 3.5 MMOL/L (ref 3.5–5.3)
PROT SERPL-MCNC: 7.3 G/DL (ref 6.4–8.4)
RBC # BLD AUTO: 4.01 MILLION/UL (ref 3.81–5.12)
SODIUM SERPL-SCNC: 140 MMOL/L (ref 135–147)
WBC # BLD AUTO: 8.75 THOUSAND/UL (ref 4.31–10.16)

## 2022-11-03 RX ORDER — AMOXICILLIN AND CLAVULANATE POTASSIUM 875; 125 MG/1; MG/1
1 TABLET, FILM COATED ORAL EVERY 12 HOURS SCHEDULED
Qty: 3 TABLET | Refills: 0 | Status: SHIPPED | OUTPATIENT
Start: 2022-11-03 | End: 2022-11-05

## 2022-11-03 RX ORDER — OXYCODONE HYDROCHLORIDE 5 MG/1
2.5-5 TABLET ORAL EVERY 4 HOURS PRN
Qty: 20 TABLET | Refills: 0 | Status: SHIPPED | OUTPATIENT
Start: 2022-11-03 | End: 2022-11-06

## 2022-11-03 RX ORDER — ACETAMINOPHEN 325 MG/1
650 TABLET ORAL EVERY 4 HOURS PRN
Qty: 30 TABLET | Refills: 0
Start: 2022-11-03 | End: 2022-12-03

## 2022-11-03 RX ORDER — IBUPROFEN 200 MG
400 TABLET ORAL EVERY 6 HOURS PRN
Refills: 0
Start: 2022-11-03

## 2022-11-03 RX ADMIN — METRONIDAZOLE 500 MG: 500 INJECTION, SOLUTION INTRAVENOUS at 08:23

## 2022-11-03 RX ADMIN — HEPARIN SODIUM 5000 UNITS: 5000 INJECTION INTRAVENOUS; SUBCUTANEOUS at 05:14

## 2022-11-03 RX ADMIN — METRONIDAZOLE 500 MG: 500 INJECTION, SOLUTION INTRAVENOUS at 00:00

## 2022-11-03 RX ADMIN — HYDROMORPHONE HYDROCHLORIDE 0.2 MG: 0.2 INJECTION, SOLUTION INTRAMUSCULAR; INTRAVENOUS; SUBCUTANEOUS at 14:32

## 2022-11-03 RX ADMIN — HEPARIN SODIUM 5000 UNITS: 5000 INJECTION INTRAVENOUS; SUBCUTANEOUS at 14:32

## 2022-11-03 NOTE — ASSESSMENT & PLAN NOTE
- Acute appendicitis, present on presentation   - Status post laparoscopic appendectomy on 11/02/2022  - Diet as tolerated  - Plan for continue antibiotics with Augmentin for an additional 4 days on discharge  - Continue analgesia as needed  - Continue activity as tolerated with postoperative restrictions   - Outpatient follow-up in the surgical clinic for re-evaluation

## 2022-11-03 NOTE — PROGRESS NOTES
Progress Note - Red Surgery   DELL Resident on RED Service   Tracee Avina 20 y o  female MRN: [id number]  Unit/Bed#: [facility] 421-08 Encounter: [id number]    Assessment:  Ms Tracee Avina is a 19 yo female who presents with RLQ pain in the setting of acute uncomplicated appendicitis now s/p laparoscopic appendectomy on 11/3  Afebrile  VSS   UOP 700cc  AM labs pending    Plan:  -regular diet  -IV fluids dc'd  -IV abx  -PRN pain meds  -PRN anti-nausea  -DVT prophylaxis  -OOB and ambulating  -incentive spirometry  -Anticipated discharge within 24-48 hours      Subjective/Objective     Subjective: NAEO  No nausea  No vomiting  No fevers  No chills  No chest pain  No SOB  Passing flatus  No bowel movements  Making urine w/o difficulty  Objective:     Blood pressure 130/80, pulse 58, temperature 97 8 °F (36 6 °C), resp  rate 17, SpO2 96 %, not currently breastfeeding  ,There is no height or weight on file to calculate BMI  Intake/Output Summary (Last 24 hours) at 11/3/2022 0547  Last data filed at 11/2/2022 2008  Gross per 24 hour   Intake 2950 ml   Output 700 ml   Net 2250 ml       Invasive Devices  Report    Peripheral Intravenous Line  Duration           Peripheral IV 11/02/22 Left Antecubital 1 day                General: NAD  HENT: NCAT MMM  Neck: supple, no JVD  CV: nl rate  Lungs: nl wob  No resp distress  ABD: Soft, nontender, nondistended  Incisions c/d/i  Extrem: No CCE  Neuro: AAOx3       Scheduled Meds:  Current Facility-Administered Medications   Medication Dose Route Frequency Provider Last Rate   • heparin (porcine)  5,000 Units Subcutaneous [facility] Rosetta Diaz MD     • HYDROmorphone  0 5 mg Intravenous Q3H PRN Rosetta Diaz MD     • HYDROmorphone  0 2 mg Intravenous Q3H PRN Rosetta Diaz MD     • metroNIDAZOLE  500 mg Intravenous Sarah Dhillon  mg (11/03/22 0000)   • ondansetron  4 mg Intravenous Q6H PRN Rosetta Diaz MD       Continuous Infusions:   PRN Meds:  • HYDROmorphone  •  HYDROmorphone  •  ondansetron      Lab, Imaging and other studies:  I have personally reviewed pertinent lab results    , CBC:   No results found for: WBC, HGB, HCT, MCV, PLT, ADJUSTEDWBC, MCH, MCHC, RDW, MPV, NRBC, CMP:   No results found for: SODIUM, K, CL, CO2, ANIONGAP, BUN, CREATININE, GLUCOSE, CALCIUM, AST, ALT, ALKPHOS, PROT, BILITOT, EGFR, Coagulation: No results found for: PT, INR, APTT, Urinalysis: No results found for: COLORU, CLARITYU, SPECGRAV, PHUR, LEUKOCYTESUR, NITRITE, PROTEINUA, GLUCOSEU, KETONESU, BILIRUBINUR, BLOODU, Amylase: No results found for: AMYLASE, Lipase:   No results found for: LIPASE  VTE Pharmacologic Prophylaxis: Heparin  VTE Mechanical Prophylaxis: sequential compression device      [facility]  11/3/2022 5:47 AM

## 2022-11-03 NOTE — PLAN OF CARE
Problem: PAIN - ADULT  Goal: Verbalizes/displays adequate comfort level or baseline comfort level  Description: Interventions:  - Encourage patient to monitor pain and request assistance  - Assess pain using appropriate pain scale  - Administer analgesics based on type and severity of pain and evaluate response  - Implement non-pharmacological measures as appropriate and evaluate response  - Consider cultural and social influences on pain and pain management  - Notify physician/advanced practitioner if interventions unsuccessful or patient reports new pain  Outcome: Adequate for Discharge     Problem: INFECTION - ADULT  Goal: Absence or prevention of progression during hospitalization  Description: INTERVENTIONS:  - Assess and monitor for signs and symptoms of infection  - Monitor lab/diagnostic results  - Monitor all insertion sites, i e  indwelling lines, tubes, and drains  - Monitor endotracheal if appropriate and nasal secretions for changes in amount and color  - [place] appropriate cooling/warming therapies per order  - Administer medications as ordered  - Instruct and encourage patient and family to use good hand hygiene technique  - Identify and instruct in appropriate isolation precautions for identified infection/condition  Outcome: Adequate for Discharge  Goal: Absence of fever/infection during neutropenic period  Description: INTERVENTIONS:  - Monitor WBC    Outcome: Adequate for Discharge     Problem: SAFETY ADULT  Goal: Patient will remain free of falls  Description: INTERVENTIONS:  - Educate patient/family on patient safety including physical limitations  - Instruct patient to call for assistance with activity   - Consult OT/PT to assist with strengthening/mobility   - Keep Call bell within reach  - Keep bed low and locked with side rails adjusted as appropriate  - Keep care items and personal belongings within reach  - Initiate and maintain comfort rounds  - Make Fall Risk Sign visible to staff  - Offer Toileting every 1 Hours, in advance of need  - Initiate/Maintain alarm  - Obtain necessary fall risk management equipment:   - Apply yellow socks and bracelet for high fall risk patients  - Consider moving patient to room near nurses station  Outcome: Adequate for Discharge  Goal: Maintain or return to baseline ADL function  Description: INTERVENTIONS:  -  Assess patient's ability to carry out ADLs; assess patient's baseline for ADL function and identify physical deficits which impact ability to perform ADLs (bathing, care of mouth/teeth, toileting, grooming, dressing, etc )  - Assess/evaluate cause of self-care deficits   - Assess range of motion  - Assess patient's mobility; develop plan if impaired  - Assess patient's need for assistive devices and provide as appropriate  - Encourage maximum independence but intervene and supervise when necessary  - Involve family in performance of ADLs  - Assess for home care needs following discharge   - Consider OT consult to assist with ADL evaluation and planning for discharge  - Provide patient education as appropriate  Outcome: Adequate for Discharge  Goal: Maintains/Returns to pre admission functional level  Description: INTERVENTIONS:  - Perform BMAT or MOVE assessment daily    - Set and communicate daily mobility goal to care team and patient/family/caregiver  - Collaborate with rehabilitation services on mobility goals if consulted  - Perform Range of Motion 3 times a day  - Reposition patient every 2 hours    - Dangle patient 3 times a day  - Stand patient 3 times a day  - Ambulate patient 3 times a day  - Out of bed to chair 3 times a day   - Out of bed for meals 3 times a day  - Out of bed for toileting  - Record patient progress and toleration of activity level   Outcome: Adequate for Discharge     Problem: DISCHARGE PLANNING  Goal: Discharge to home or other facility with appropriate resources  Description: INTERVENTIONS:  - Identify barriers to discharge w/patient and caregiver  - Arrange for needed discharge resources and transportation as appropriate  - Identify discharge learning needs (meds, wound care, etc )  - Arrange for interpretive services to assist at discharge as needed  - Refer to Case Management Department for coordinating discharge planning if the patient needs post-hospital services based on physician/advanced practitioner order or complex needs related to functional status, cognitive ability, or social support system  Outcome: Adequate for Discharge     Problem: Knowledge Deficit  Goal: Patient/family/caregiver demonstrates understanding of disease process, treatment plan, medications, and discharge instructions  Description: Complete learning assessment and assess knowledge base    Interventions:  - Provide teaching at level of understanding  - Provide teaching via preferred learning methods  Outcome: Adequate for Discharge

## 2022-11-03 NOTE — INCIDENTAL FINDINGS
The following findings require follow up:  Radiographic finding   Finding:  A small fat containing umbilical hernia was incidentally identified under CT imaging  Follow up required:  No additional workup or intervention necessary for this finding at this time  Please follow-up with primary care provider to review the finding and for surveillance as indicated  May also discuss this finding at the time of her surgical follow-up appointment if he would like to consider elective repair in the future  Follow up should be done within 2 week(s)      The above noted incidental findings were discussed with the patient  The patient demonstrated understanding of the findings and the follow-up recommendations      Please notify the following clinician to assist with the follow up:   Dr Garett Quinteros

## 2022-11-03 NOTE — DISCHARGE INSTRUCTIONS
Acute Care Surgery Discharge Instructions    Please follow-up as instructed  If you do not already have a follow-up appointment, please call the office when you leave to schedule an appointment to be seen in 2-3 weeks for post-operative re-evaluation  Activity:  - No lifting greater than 20 pounds or strenuous physical activity or exercise for 2 weeks  - Walking and normal light activities are encouraged  - Normal daily activities including climbing steps are okay  - No driving until no longer using pain medications  Diet:    - You may resume your normal diet  Wound Care:  - May shower daily  No tub baths or swimming until cleared by your surgeon   - Wash incision gently with soap and water and pat dry  - Do not apply any creams or ointments unless instructed to do so by your surgeon  Medications:    - You may resume all of your regular medications after discharge unless otherwise instructed  Please refer to your discharge medication list for further details  - Please complete the entire course of antibiotics, Augmentin, through the last dose on 11/07/2022  - Please take the pain medications as directed  - You are encouraged to use non-narcotic pain medications first and whenever possible  Reserve the use of narcotic pain medication for moderate to severe pain not controlled by non-narcotic medications   - No driving while taking narcotic pain medications  - You may become constipated, especially if taking pain medications  You may take any over the counter stool softeners or laxatives as needed  Examples: Milk of Magnesia, Colace, Senna  Additional Instructions:  - If you have any questions or concerns after discharge please call the office   - Call office or return to ER if fever greater than 101, chills, persistent nausea/vomiting, worsening/uncontrollable pain, and/or increasing redness or purulent/foul smelling drainage from incision(s)

## 2022-11-03 NOTE — DISCHARGE SUMMARY
[facility]  Discharge- Brenda Spears 2000, 20 y o  female MRN: [id number]  Unit/Bed#: [facility] 608-01 Encounter: [id number]  Primary Care Provider: No primary care provider on file  Date and time admitted to hospital: 11/2/2022  4:52 AM    * Acute appendicitis  Assessment & Plan  - Acute appendicitis, present on presentation   - Status post laparoscopic appendectomy on 11/02/2022  - Diet as tolerated  - Plan for continue antibiotics with Augmentin for an additional 4 days on discharge  - Continue analgesia as needed  - Continue activity as tolerated with postoperative restrictions   - Outpatient follow-up in the surgical clinic for re-evaluation  Discharge Summary - General Surgery   Brenda Spears 20 y o  female MRN: [id number]  Unit/Bed#: [facility] 178-89 Encounter: [id number]    Admission Date: 11/2/2022     Discharge Date: 11/3/2022    Admitting Diagnosis: Abdominal pain [R10 9]  Acute appendicitis [K35 80]    Discharge Diagnosis: See above  Attending and Service: Dr Cottie Cooks, Acute Care Surgical Services  Consulting Physician(s): None  Imaging and Procedures Performed:     CT abdomen pelvis with contrast    Result Date: 11/2/2022  Impression: 1  Acute appendicitis without complication  2   Trace pleural effusions  3   Punctate nonobstructing left renal calculus  I personally discussed this study with Janette Machuca on 11/2/2022 at 9:46 AM  Workstation performed: [id number]     Laparoscopic appendectomy on 11/02/2022  Pathology: Final surgical pathology pending at the time of discharge  Hospital Course: Brenda Spears is a 11-year-old female who presented with right lower quadrant abdominal pain beginning the Monday prior to presentation  She is approximately 4 weeks postpartum  She mention that the pain feels like contractions and at times is sharp in nature  She has tried multiple over-the-counter medications without relief    She denied any associated nausea, vomiting or change in bowel habits and or any fevers or chills  She did endorse some mild shortness of breath  On her initial surgical evaluation, she was afebrile with normal vital signs; her abdomen was soft and nondistended, but was tender in the right lower quadrant with focal rebound tenderness; the remainder of her exam was unremarkable  Her initial workup included labs in the above-noted imaging studies  The patient was admitted to the acute care surgery service with acute appendicitis  The patient was kept NPO, started on IV fluids and IV antibiotics, and placed on a pain control regimen  Operative intervention was recommended to the patient and the patient agreed to proceed  The patient was then taken to the operating room for a laparoscopic appendectomy on 11/02/2022  There was periappendiceal abscess noted intraoperatively and patient was continued on IV antibiotics postoperatively with plan to transition to oral antibiotics  The patient tolerated the procedure well, and there were no intraoperative complications  Postoperatively, the patient was advanced to a regular diet and tolerated it well  Once tolerating an oral diet well, the IV fluids were discontinued  The patient was transitioned to an oral pain medication regimen  By 11/03/2022, the patient is deemed stable for discharge home  For further details of the hospital encounter, please see the complete medical records  On discharge, the patient is instructed to follow-up with the patient's primary care provider within the next 2 weeks to review the events of the recent hospitalization  The patient is instructed to follow-up with the Acute Care Surgical Services as scheduled on 11/17/2022 at 11:00 AM  The patient is instructed to follow the provided discharge instructions       Condition at Discharge: good     Discharge instructions/Information to patient and family:   See after visit summary for information provided to patient and family  Provisions for Follow-Up Care:  See after visit summary for information related to follow-up care and any pertinent home health orders  Disposition: See After Visit Summary for discharge disposition information  Planned Readmission: No    Discharge Statement   I spent 30 minutes discharging the patient  This time was spent on the day of discharge  I had direct contact with the patient on the day of discharge  Additional documentation is required if more than 30 minutes were spent on discharge  Discharge Medications:  See after visit summary for reconciled discharge medications provided to patient and family      Shruti Swift PA-C  11/3/2022  3:44 PM

## 2022-11-04 NOTE — UTILIZATION REVIEW
NOTIFICATION OF EMERGENT OUTPATIENT PROCEDURE   AUTHORIZATION REQUEST   SERVICING FACILITY:   [facility]  Address: [address] [place] [address]  Tax ID: [id number]  NPI: [id number] ATTENDING PROVIDER:  Attending Name and NPI#: Rigo Leal [[id number]]  Address: [address] [place] [address]  Phone: [phone]   ADMISSION INFORMATION:  Place of Service: On [address] [place] [address] Code: 22 CPT Code:   Patient presented to the ED and had an outpatient procedure     OUTPATIENT PROCEDURE INFORMATION  Surgery Date: 11/2/2022  Discharge Date/Time: 11/3/2022  6:08 PM  Patient Preop Diagnosis:   Acute appendicitis [K35 80] Post-Op Diagnosis Codes:     * Acute appendicitis [K35 80]  Operative Indications:   Acute appendicitis [K35 80]  CPT Codes 91747 and 38626 Out patient ED- OR    UTILIZATION REVIEW CONTACT:  Lisa Aguilar Utilization   Network Utilization Review Department  Phone: [phone]  Fax: [phone]  Email: [email or web address]  Contact for approvals/pending authorizations, clinical reviews, and discharge  PHYSICIAN ADVISORY SERVICES:  Medical Necessity Denial & [facility] Review  Phone: [phone]  Fax: [phone]  Email: [email or web address]

## 2022-11-17 ENCOUNTER — OFFICE VISIT (OUTPATIENT)
Dept: SURGERY | Facility: CLINIC | Age: 22
End: 2022-11-17

## 2022-11-17 VITALS
TEMPERATURE: 97.6 F | HEART RATE: 87 BPM | WEIGHT: 192.2 LBS | DIASTOLIC BLOOD PRESSURE: 79 MMHG | BODY MASS INDEX: 32.81 KG/M2 | SYSTOLIC BLOOD PRESSURE: 128 MMHG | HEIGHT: 64 IN

## 2022-11-17 DIAGNOSIS — K35.80 ACUTE APPENDICITIS: Primary | ICD-10-CM

## 2022-11-17 NOTE — PROGRESS NOTES
Office Visit - General Surgery  Subha Alexander MRN: [id number]  Encounter: [id number]    Assessment and Plan  Problem List Items Addressed This Visit        Digestive    Acute appendicitis - Primary   -Doing well s/p lap appy for perforated appendicitis  -Pathology reviewed  -Plans to return to work in 7 weeks (has a work note)  States that she may need a return to work note  She was encouraged to call clinic if this is needed  -May follow up as needed, encouraged to call clinic with any questions or concerns  Chief Complaint:  Subha Alexander is a 20 y o  female who presents for Post-op (P/o appendectomy)    Subjective  20 yr old female presenting s/p laparoscopic appendectomy secondary to perforated appendicitis 2022  Patient states she has been doing well  She is tolerating a diet, denies fevers or abdominal pain, and has been having regular bowel movements  She completed her course of antibiotics         Past Medical History:   Diagnosis Date   • Allergic rhinitis    • Eczema    • Kidney stone    • Varicella     immunized       Past Surgical History:   Procedure Laterality Date   • APPENDECTOMY LAPAROSCOPIC N/A 2022    Procedure: APPENDECTOMY LAPAROSCOPIC, possible open;  Surgeon: Chris Santos DO;  Location: [facility];  Service: General   • WISDOM TOOTH EXTRACTION         Family History   Problem Relation Age of Onset   • No Known Problems Mother    • No Known Problems Father    • No Known Problems Brother    • No Known Problems Brother    • Mental illness Neg Hx    • Substance Abuse Neg Hx        Social History     Tobacco Use   • Smoking status: Former     Packs/day: 0 25     Years: 5 00     Pack years: 1 25     Types: Cigarettes     Quit date:      Years since quittin 8   • Smokeless tobacco: Never   • Tobacco comments:     vapes   Vaping Use   • Vaping Use: Former   • Quit date: 6/15/2021   • Substances: Nicotine   Substance Use Topics   • Alcohol use: Not Currently     Comment: occ   • Drug use: No        Medications  Current Outpatient Medications on File Prior to Visit   Medication Sig Dispense Refill   • acetaminophen (TYLENOL) 325 mg tablet Take 2 tablets (650 mg total) by mouth every 4 (four) hours as needed for mild pain Do not exceed 4 g daily  30 tablet 0   • ibuprofen (MOTRIN) 200 mg tablet Take 2 tablets (400 mg total) by mouth every 6 (six) hours as needed for mild pain Take with food  0   • norethindrone-ethinyl estradiol (Junel FE 1/20) 1-20 MG-MCG per tablet Take 1 tablet by mouth daily 84 tablet 3     No current facility-administered medications on file prior to visit  Allergies  No Known Allergies    Review of Systems   Constitutional: Negative  Respiratory: Negative  Cardiovascular: Negative  Gastrointestinal: Negative  Genitourinary: Negative  Musculoskeletal: Negative  Skin: Negative  Neurological: Negative  Objective  Vitals:    11/17/22 1122   BP: 128/79   Pulse: 87   Temp: 97 6 °F (36 4 °C)       Physical Exam  Constitutional:       Appearance: Normal appearance  HENT:      Nose: Nose normal       Mouth/Throat:      Mouth: Mucous membranes are moist    Cardiovascular:      Rate and Rhythm: Normal rate and regular rhythm  Pulses: Normal pulses  Pulmonary:      Effort: Pulmonary effort is normal    Abdominal:      Comments: Soft, appropriate incisional tenderness, well healing incisions c/d/i secured with exofin     Musculoskeletal:         General: Normal range of motion  Skin:     General: Skin is warm  Neurological:      General: No focal deficit present  Mental Status: She is alert

## 2022-12-22 ENCOUNTER — OFFICE VISIT (OUTPATIENT)
Dept: URGENT CARE | Facility: MEDICAL CENTER | Age: 22
End: 2022-12-22

## 2022-12-22 VITALS
RESPIRATION RATE: 18 BRPM | SYSTOLIC BLOOD PRESSURE: 110 MMHG | OXYGEN SATURATION: 99 % | HEART RATE: 85 BPM | DIASTOLIC BLOOD PRESSURE: 66 MMHG | TEMPERATURE: 96.1 F

## 2022-12-22 DIAGNOSIS — J02.9 SORE THROAT: Primary | ICD-10-CM

## 2022-12-22 LAB
S PYO AG THROAT QL: NEGATIVE
SARS-COV-2 AG UPPER RESP QL IA: NEGATIVE
VALID CONTROL: NORMAL

## 2022-12-22 NOTE — LETTER
Assessment/Plan:    Chronic bilateral low back pain with left-sided sciatica  Refer for PT    Asthma  Stable  Continue Advair  Recheck 6 months    Hyperlipidemia  Check labs  Recheck 6 months       Diagnoses and all orders for this visit:    Mixed hyperlipidemia  -     Comprehensive metabolic panel; Future  -     Lipid panel; Future    Mild intermittent asthma without complication    Chronic bilateral low back pain with left-sided sciatica  -     Ambulatory referral to Physical Therapy; Future          Subjective:      Patient ID: Srinivasa Radford is a 61 y o  female  F/u multiple med issues  - notes some R outer thigh pain since this past winter  No trauma  PT with hx of L leg pain (sciatica related) and may compensate at times, putting increased pressure on right  No decreased strength  No trauma  - asthma stable a long as she takes her Advair b i d   Patient needed to use a rescue inhaler once in the last year when she had URI symptoms  No other signs of worsening   -No Cp, palp, lightheadedness or other CV symptoms  - no GI complaints  Up to date with colonoscopy  - no /Gyn issues  Up to date with mammo and Gyn        The following portions of the patient's history were reviewed and updated as appropriate:   She  has no past medical history on file  She   Patient Active Problem List    Diagnosis Date Noted    Chronic bilateral low back pain with left-sided sciatica 04/24/2018    Degenerative arthritis of thumb, right 09/14/2015    Carpal tunnel syndrome 02/19/2015    Osteopenia 08/21/2014    Asthma 02/07/2013    Allergic rhinitis 08/07/2012    Hyperlipidemia 08/07/2012     She  has a past surgical history that includes Tooth extraction  She  reports that she has never smoked  She does not have any smokeless tobacco history on file  She reports that she drinks alcohol  Her drug history is not on file    Current Outpatient Prescriptions   Medication Sig Dispense Refill    Calcium 600 MG tablet December 22, 2022     Patient: Jeannine Vila   YOB: 2000   Date of Visit: 12/22/2022       To Whom it May Concern:    Jeannine Vila was seen in my clinic on 12/22/2022  She may return to work on 12/23/2022  If you have any questions or concerns, please don't hesitate to call           Sincerely,          PARAG Burrows        CC: No Recipients Take 2 tablets by mouth daily      [facility] GLUCOSAMINE-CHONDROITIN PO Take by mouth      fluticasone-salmeterol (ADVAIR DISKUS) 100-50 mcg/dose Inhale      loratadine (CLARITIN) 10 mg tablet Take 1 tablet by mouth daily as needed      Multiple Vitamin (MULTIVITAMIN) capsule Take 1 capsule by mouth daily      rosuvastatin (CRESTOR) 20 MG tablet TAKE 1 TABLET DAILY 90 tablet 3    Vitamin D, Cholecalciferol, 400 units TABS Take by mouth       No current facility-administered medications for this visit  She is allergic to penicillins       Review of Systems   Constitutional: Negative  HENT: Negative  Eyes: Negative  Respiratory: Negative  Cardiovascular: Negative  Gastrointestinal: Negative  Endocrine: Negative  Genitourinary: Negative  Musculoskeletal: Positive for arthralgias, back pain, gait problem and myalgias  Skin: Negative  Allergic/Immunologic: Negative  Neurological: Negative for dizziness, weakness and numbness  Hematological: Negative  Psychiatric/Behavioral: Negative  Objective:      /82   Pulse 74   Temp 97 6 °F (36 4 °C)   Ht 4' 9 5" (1 461 m)   Wt 75 5 kg (166 lb 6 4 oz)   BMI 35 39 kg/m²          Physical Exam   Constitutional: She is oriented to person, place, and time  She appears well-developed and well-nourished  HENT:   Head: Normocephalic and atraumatic  Right Ear: External ear normal    Left Ear: External ear normal    Nose: Nose normal    Mouth/Throat: Oropharynx is clear and moist  No oropharyngeal exudate  Eyes: Conjunctivae and EOM are normal  Pupils are equal, round, and reactive to light  Neck: Normal range of motion  Neck supple  No JVD present  No thyromegaly present  Cardiovascular: Normal rate, regular rhythm and intact distal pulses  No murmur heard  Pulmonary/Chest: Effort normal and breath sounds normal    Abdominal: Soft  She exhibits no distension  There is no tenderness     Musculoskeletal:        Arms: Legs:  Lymphadenopathy:     She has no cervical adenopathy  Neurological: She is alert and oriented to person, place, and time  She has normal reflexes  She exhibits normal muscle tone  Coordination normal    Skin: Skin is warm and dry  She is not diaphoretic  Psychiatric: She has a normal mood and affect  Vitals reviewed

## 2022-12-22 NOTE — PROGRESS NOTES
[facility]        NAME: Tam Grace is a 20 y o  female  : 2000    MRN: [id number]  DATE: 2022  TIME: 2:30 PM    Assessment and Plan   Sore throat [J02 9]  1  Sore throat  POCT rapid strepA    Poct Covid 19 Rapid Antigen Test    Throat culture        Patient presents with c/o sore throat that started yesterday  Denies fevers  Has some PND  Has not taken anything for symptoms  Assessment notes PND, no enlargement or exudate  No adenopathy  POCT strep neg- will send culture  Patient Instructions       Follow up with PCP as needed    Chief Complaint     Chief Complaint   Patient presents with   • Sore Throat     Pt C/O sore throat that started yesterday  History of Present Illness       Patient presents with c/o sore throat that started yesterday  Denies fevers  Has some PND  Has not taken anything for symptoms  Assessment notes PND, no enlargement or exudate  No adenopathy  POCT strep neg- will send culture  Review of Systems   Review of Systems   Constitutional: Negative for chills and fever  HENT: Positive for congestion, postnasal drip and sore throat  Negative for ear pain and sinus pain  Eyes: Negative for pain and itching  Respiratory: Negative for cough, shortness of breath and wheezing  Cardiovascular: Negative for chest pain and palpitations  Gastrointestinal: Negative for abdominal pain, constipation, diarrhea, nausea and vomiting  Genitourinary: Negative for difficulty urinating and hematuria  Musculoskeletal: Negative for arthralgias and myalgias  Skin: Negative for rash  Neurological: Negative for dizziness, light-headedness and headaches  Psychiatric/Behavioral: Negative for agitation and sleep disturbance  The patient is not nervous/anxious            Current Medications       Current Outpatient Medications:   •  ibuprofen (MOTRIN) 200 mg tablet, Take 2 tablets (400 mg total) by mouth every 6 (six) hours as needed for mild pain Take with food  , Disp: , Rfl: 0  •  norethindrone-ethinyl estradiol (Junel FE 1/20) 1-20 MG-MCG per tablet, Take 1 tablet by mouth daily, Disp: 84 tablet, Rfl: 3    Current Allergies     Allergies as of 12/22/2022   • (No Known Allergies)            The following portions of the patient's history were reviewed and updated as appropriate: allergies, current medications, past family history, past medical history, past social history, past surgical history and problem list      Past Medical History:   Diagnosis Date   • Allergic rhinitis    • Eczema    • Kidney stone    • Varicella     immunized       Past Surgical History:   Procedure Laterality Date   • APPENDECTOMY LAPAROSCOPIC N/A 11/2/2022    Procedure: APPENDECTOMY LAPAROSCOPIC, possible open;  Surgeon: Nii Fischer DO;  [facility];  Service: General   • WISDOM TOOTH EXTRACTION         Family History   Problem Relation Age of Onset   • No Known Problems Mother    • No Known Problems Father    • No Known Problems Brother    • No Known Problems Brother    • Mental illness Neg Hx    • Substance Abuse Neg Hx          Medications have been verified  Objective   /66 (BP Location: Left arm, Patient Position: Sitting, Cuff Size: Large)   Pulse 85   Temp (!) 96 1 °F (35 6 °C) (Tympanic)   Resp 18   SpO2 99%   No LMP recorded  (Menstrual status: Birth Control)  Physical Exam     Physical Exam  Vitals reviewed  Constitutional:       General: She is not in acute distress  Appearance: Normal appearance  She is not ill-appearing  HENT:      Head: Normocephalic and atraumatic  Right Ear: Tympanic membrane normal       Left Ear: Tympanic membrane normal       Nose: Congestion and rhinorrhea present  Mouth/Throat: Tonsils: No tonsillar exudate  1+ on the right  1+ on the left  Eyes:      Extraocular Movements: Extraocular movements intact        Conjunctiva/sclera: Conjunctivae normal    Cardiovascular:      Rate and Rhythm: Normal rate and regular rhythm  Pulmonary:      Effort: Pulmonary effort is normal    Lymphadenopathy:      Cervical: No cervical adenopathy  Skin:     General: Skin is warm  Neurological:      General: No focal deficit present  Mental Status: She is alert     Psychiatric:         Mood and Affect: Mood normal          Behavior: Behavior normal          Judgment: Judgment normal

## 2022-12-24 LAB — BACTERIA THROAT CULT: NORMAL

## 2023-01-23 ENCOUNTER — TELEMEDICINE (OUTPATIENT)
Dept: BEHAVIORAL/MENTAL HEALTH CLINIC | Facility: CLINIC | Age: 23
End: 2023-01-23

## 2023-01-23 DIAGNOSIS — F41.9 ANXIETY: ICD-10-CM

## 2023-01-23 NOTE — PSYCH
Virtual Regular Visit    Verification of patient location:    Patient is located in the following state in which I hold an active license [place]      Assessment/Plan:    Problem List Items Addressed This Visit    None  Visit Diagnoses     Post partum depression    -  Primary    Anxiety              Goals addressed in session: Goal 1 :Obtain background information         Reason for visit is No chief complaint on file  Encounter provider Haley Alicea LCSW    Provider located at [address] [facility] [address]   [address] [facility] [address] [phone]      Recent Visits  No visits were found meeting these conditions  Showing recent visits within past 7 days and meeting all other requirements  Today's Visits  Date Type Provider Dept   01/23/23 Telemedicine Haley Alicea LCSW [facility]   Showing today's visits and meeting all other requirements  Future Appointments  No visits were found meeting these conditions  Showing future appointments within next 150 days and meeting all other requirements       The patient was identified by name and date of birth  Malorie Cardenasfausto was informed that this is a telemedicine visit and that the visit is being conducted throughthe [employer] platform  She agrees to proceed     My office door was closed  No one else was in the room  She acknowledged consent and understanding of privacy and security of the video platform  The patient has agreed to participate and understands they can discontinue the visit at any time  Patient is aware this is a billable service  Jay Humphries is a 20 y o  female who presents for initial therapy session  Pt was in therapy "years ago "  She provided all necessary background information  She grew up in [place] with both parents and two brothers  She has a good relationship with her family  No family hx of MH/D&A    Pt had difficulty identifying leisure activities or interests  She does enjoy working on her car however  Pt works FT at [employer], 6p-4a M-Th  She does not like her job and is looking for something else that is day shift but is having difficulty finding the same pay rate  Pt resides with her boyfriend, Matias Power and they have been together for 1 5 years  Pt gave birth to Patsy MAC on 8/23/22  This was an unplanned pregnancy  The pregnancy and delivery went well for her  Pt attempted to breast feed but the baby had difficulty latching  The baby is getting formula in a bottle  Matias Power also works the same job and same shift  Pt's parents watch the baby while they are at work  Matias Power is also looking for another job  Pt is receiving WIC for the baby  Pt is seeking therapy because she realized she was ignoring her post partum symptoms  She had been lacking motivation and was upset about their financial struggles  These things are getting better however  Pt does not get a lot of "me time " Pt has friends but they all make excuses as to why they cannot get together  Pt explained that Pool's mother came for a "visit" a few weeks ago and never left  Apparently, she is going to stay there until she finds a place of her own  She is currently sleeping on their couch  Pt pointed out that Pool's mother has been very disrespectful to Matias Jannet and yells at him and curses in front of the baby  Pt had a talk with Matias Power about this and Matias Power has told his mother to stop acting this way  He has even told her a few times to leave but she still remains there  Processed pt's feelings and discussed coping skills         HPI     Past Medical History:   Diagnosis Date   • Allergic rhinitis    • Eczema    • Kidney stone    • Varicella     immunized       Past Surgical History:   Procedure Laterality Date   • APPENDECTOMY LAPAROSCOPIC N/A 11/2/2022    Procedure: APPENDECTOMY LAPAROSCOPIC, possible open;  Surgeon: Ingrid Kussmaul, DO;  [facility]; Service: General   • WISDOM TOOTH EXTRACTION         Current Outpatient Medications   Medication Sig Dispense Refill   • ibuprofen (MOTRIN) 200 mg tablet Take 2 tablets (400 mg total) by mouth every 6 (six) hours as needed for mild pain Take with food  0   • norethindrone-ethinyl estradiol (Junel FE 1/20) 1-20 MG-MCG per tablet Take 1 tablet by mouth daily 84 tablet 3     No current facility-administered medications for this visit  No Known Allergies    Review of Systems: Pt was pleasant, cooperative and engaged    Video Exam    There were no vitals filed for this visit  Physical Exam     Assessment/Plan:      Diagnoses and all orders for this visit:    Post partum depression    Anxiety          Subjective:      Patient ID: Kang Barajas is a 20 y o  female  HPI:     Pre-morbid level of function and History of Present Illness: S/P delivery  Previous Psychiatric/psychological treatment/year: Years ago when in   Current Psychiatrist/Therapist: None  Outpatient and/or Partial and Other [employer] Used (CTT, ICM, VNA): NA      Problem Assessment:     SOCIAL/VOCATION:  Family Constellation (include parents, relationship with each and pertinent Psych/Medical History):     Family History   Problem Relation Age of Onset   • No Known Problems Mother    • No Known Problems Father    • No Known Problems Brother    • No Known Problems Brother    • Mental illness Neg Hx    • Substance Abuse Neg Hx        Mother: NA  Spouse: NA   Father: NA   Children: NA   Sibling: NA   Sibling: NA   Children: NA   Other: JACQUES    Socorro relates best to Novi and her parents  she lives with Novi and their baby daughter  she does not live alone  Domestic Violence: No past history of domestic violence    Additional Comments related to family/relationships/peer support: Pt's parents and Novi are supportive       School or Work History (strengths/limitations/needs): FT at [employer]    Her highest grade level achieved was HS     history includes NA    Financial status includes Getting back on track    LEISURE ASSESSMENT (Include past and present hobbies/interests and level of involvement (Ex: Group/Club Affiliations): Working on her car  her primary language is [place]  Preferred language is [place]  Ethnic considerations are NA  Religions affiliations and level of involvement NA   Does spirituality help you cope? No    FUNCTIONAL STATUS: There has been a recent change in [place] ability to do the following: NA    Level of Assistance Needed/By Whom?: JACQUES    Socorro learns best by  NA    SUBSTANCE ABUSE ASSESSMENT: no substance abuse    Substance/Route/Age/Amount/Frequency/Last Use: NA    DETOX HISTORY: NA    Previous detox/rehab treatment: NA    HEALTH ASSESSMENT: no referral to PCP needed    LEGAL: No Mental Health Advance Directive or Power of  on file    Prenatal History: uneventful pregnancy    Delivery History: N/A    Developmental Milestones: N/A  Temperament as an infant was N/A  Temperament as a toddler was N/A  Temperament at school age was N/A  Temperament as a teenager was N/A  Risk Assessment:   The following ratings are based on my observation of this patient over the last session    Risk of Harm to Self:   Demographic risk factors include   Historical Risk Factors include NA  Recent Specific Risk Factors include diagnosis of depression   Additional Factors for a Child or Adolescent gender: female (more likely to attempt)    Risk of Harm to Others:   Demographic Risk Factors include NA  Historical Risk Factors include NA  Recent Specific Risk Factors include multiple stressors    Access to Weapons:   Elsy Rodarte has access to the following weapons: None   The following steps have been taken to ensure weapons are properly secured: NA    Based on the above information, the client presents the following risk of harm to self or others:  low    The following interventions are recommended:   no intervention changes    Notes regarding this Risk Assessment: NA        Review Of Systems:     Mood Anxiety and Depression   Behavior Normal    Thought Content Normal   General Normal    Personality Normal   Other Psych Symptoms Normal   Constitutional Normal   ENT Normal   Cardiovascular Normal    Respiratory Normal    Gastrointestinal Normal   Genitourinary Normal    Musculoskeletal Negative   Integumentary Normal    Neurological Normal    Endocrine Normal          Mental status:  Appearance calm and cooperative    Mood mood appropriate   Affect affect appropriate    Speech a normal rate   Thought Processes normal thought processes   Hallucinations no hallucinations present    Thought Content no delusions   Abnormal Thoughts no suicidal thoughts  and no homicidal thoughts    Orientation  oriented to person and place and time   Remote Memory short term memory intact and long term memory intact   Attention Span concentration intact   Intellect Appears to be of Average Intelligence   Fund of Knowledge displays adequate knowledge of current events   Insight Insight intact   Judgement judgment was intact   Muscle Strength Muscle strength and tone were normal   Language na   Pain none   Pain Scale 0     Visit start and stop times:    01/23/23  Start Time: 0857  Stop Time: 0940  Total Visit Time: 43 minutes

## 2023-02-06 ENCOUNTER — TELEMEDICINE (OUTPATIENT)
Dept: BEHAVIORAL/MENTAL HEALTH CLINIC | Facility: CLINIC | Age: 23
End: 2023-02-06

## 2023-02-06 DIAGNOSIS — F41.9 ANXIETY: ICD-10-CM

## 2023-02-06 NOTE — PSYCH
Virtual Regular Visit    Verification of patient location:    Patient is located in the following state in which I hold an active license [place]      Assessment/Plan:    Problem List Items Addressed This Visit    None  Visit Diagnoses     Post partum depression    -  Primary    Anxiety              Goals addressed in session: Goal 1: Reduce PPD and depression          Reason for visit is No chief complaint on file  Encounter provider Iris Qureshi LCSW    Provider located at [address] [facility] [address]   [address] [facility] [address] [phone]      Recent Visits  No visits were found meeting these conditions  Showing recent visits within past 7 days and meeting all other requirements  Today's Visits  Date Type Provider Dept   02/06/23 Telemedicine Iris Qureshi LCSW [facility] [place]   Showing today's visits and meeting all other requirements  Future Appointments  No visits were found meeting these conditions  Showing future appointments within next 150 days and meeting all other requirements       The patient was identified by name and date of birth  Garth Gallagher was informed that this is a telemedicine visit and that the [facility]  She agrees to proceed     My office door was closed  No one else was in the room  She acknowledged consent and understanding of privacy and security of the video platform  The patient has agreed to participate and understands they can discontinue the visit at any time  Patient is aware this is a billable service  Kim Cates is a 20 y o  female who presents for follow up therapy session   Pt reported that she has been "alright for the most part "  Her boyfriend's mother is still at their apartment and this is causing for increased stress and arguments   Pool's mother is supposedly looking for a place and needs to find something by  or her Section 8 certificate will   Pt and Max Painting are trying to learn how to appropriately communicate with each other instead of instantly arguing  Pt admits that she needs to walk away instead of reacting negatively  Encouraged pt to re-frame her thinking and when something comes up to decide will it matter in 10 minutes, 10 hours, 10 days  Pt agreed to work on this  The baby is doing well overall  At present, pt's stressor is Pool's mother staying there and causing arguments and being passive aggressive  HPI     Past Medical History:   Diagnosis Date   • Allergic rhinitis    • Eczema    • Kidney stone    • Varicella     immunized       Past Surgical History:   Procedure Laterality Date   • APPENDECTOMY LAPAROSCOPIC N/A 2022    Procedure: APPENDECTOMY LAPAROSCOPIC, possible open;  Surgeon: Daniela Lovett DO;  [facility];  Service: General   • WISDOM TOOTH EXTRACTION         Current Outpatient Medications   Medication Sig Dispense Refill   • ibuprofen (MOTRIN) 200 mg tablet Take 2 tablets (400 mg total) by mouth every 6 (six) hours as needed for mild pain Take with food  0   • norethindrone-ethinyl estradiol (Junel FE 1/20) 1-20 MG-MCG per tablet Take 1 tablet by mouth daily 84 tablet 3     No current facility-administered medications for this visit  No Known Allergies    Review of Systems: Pt was pleasant, cooperative and engaged  Video Exam    There were no vitals filed for this visit  Physical Exam     Behavioral Health Psychotherapy Progress Note    Psychotherapy Provided: Individual Psychotherapy     1  Post partum depression        2  Anxiety            Goals addressed in session: Goal 1     DATA:   During this session, this clinician used the following therapeutic modalities: Engagement Strategies, Solution-Focused Therapy and Supportive Psychotherapy    Substance Abuse was not addressed during this session   If the client is diagnosed with a co-occurring substance use disorder, please indicate any changes in the frequency or amount of use: na  Stage of change for addressing substance use diagnoses: No substance use/Not applicable    ASSESSMENT:  Caleb Frost presents with a Euthymic/ normal mood  her affect is Normal range and intensity, which is congruent, with her mood and the content of the session  The client has made progress on their goals  Caleb Frost presents with a none risk of suicide, none risk of self-harm, and none risk of harm to others  For any risk assessment that surpasses a "low" rating, a safety plan must be developed  A safety plan was indicated: no  If yes, describe in detail na    PLAN: Between sessions, Caleb Frost will begin to re-frame her thinking so as to avoid conflict with Jonah Mena  At the next session, the therapist will use Engagement Strategies, Solution-Focused Therapy and Supportive Psychotherapy to address anxiety & PPD  Behavioral Health Treatment Plan and Discharge Planning: Caleb Frost is aware of and agrees to continue to work on their treatment plan  They have identified and are working toward their discharge goals   yes    Visit start and stop times:    02/06/23  Start Time: 0900  Stop Time: 3502  Total Visit Time: 47 minutes

## 2023-03-20 ENCOUNTER — TELEMEDICINE (OUTPATIENT)
Dept: BEHAVIORAL/MENTAL HEALTH CLINIC | Facility: CLINIC | Age: 23
End: 2023-03-20

## 2023-03-20 DIAGNOSIS — F41.9 ANXIETY: Primary | ICD-10-CM

## 2023-03-20 NOTE — BH CRISIS PLAN
Client Name: Vance Garay       Client YOB: 2000  : 2000    Treatment Team (include name and contact information):     Psychotherapist: Joshua Maldonado LCSW    Psychiatrist: yasmani   Release of information completed: no    " na   Release of information completed: no    Other (Specify Role): na    Release of information completed: no    Other (Specify Role): na2   Release of information completed: no    Healthcare Provider  [facility]  [address] [place] [address]  [phone]    Type of Plan   * Child plans (children 15 yo and younger) must be completed and signed by the child's legal guardian   * Plans for all individuals 15 yo and above must be signed by the client  Plan Type: adolescent/adult (15 and over) Initial      My Personal Strengths are (in the client's own words):  Listening; hard worker; good mom; patience    The stressors and triggers that may put me at risk are:  being physically tired and feeling a lack of control    Coping skills I can use to keep myself calm and safe: Other (describe) being by myself    Coping skills/supports I can use to maintain abstinence from substance use:   na    The people that provide me with help and support: (Include name, contact, and how they can help)   Support person #1: Devendra Lucero    * Phone number: [phone]    * How can they help me? Helps me see things from a different point of view; Support person #2:Pool, boyfriend    * Phone number: [phone]    * How can they help me?  Sits and talks with me       In the past, the following has helped me in times of crisis:    Being in a quiet space and Calling a family member      If it is an emergency and you need immediate help, call     If there is a possibility of danger to yourself or others, call the following crisis hotline resources:     Adult Crisis Numbers  Suicide Prevention Hotline - Dial   [facility]: [address]: [address]: [address]: [phone]  [facility] ([facility]): [phone]  [place]: [address]: [address] [place] [address] St: [phone] (daytime)  [phone] (after hours, weekends, holidays)     Child/Adolescent Crisis Numbers   [facility]: [address]: [phone]   Wilfredo Kathryn: [phone]   [address] [facility] ([facility]): [phone]    Please note: Some counties do not have a separate number for Child/Adolescent specific crisis  If your county is not listed under Child/Adolescent, please call the adult number for your county     National Talk to Text Line   All Ages - 034-526    In the event your feelings become unmanageable, and you cannot reach your support system, you will call 911 immediately or go to the nearest hospital emergency room

## 2023-03-20 NOTE — BH TREATMENT PLAN
Outpatient Behavioral Health Psychotherapy Treatment Plan    Kita Padgett  2000     Date of Initial Psychotherapy Assessment: 01/20/23  Date of Current Treatment Plan: 03/20/23  Treatment Plan Target Date: 09/20/23  Treatment Plan Expiration Date: 09/20/23    Diagnosis:   No diagnosis found  Area(s) of Need: Improve communication; Reduce anxiety    Long Term Goal 1 (in the client's own words): Eliminate stressors that cause my anxiety to be high    Stage of Change: Action    Target Date for completion: 09/20/23     Anticipated therapeutic modalities: Supportive; Solution focused     People identified to complete this goal: Socorro      Objective 1: (identify the means of measuring success in meeting the objective): Reduce anxiety from daily to 2 times per week        I am currently under the care of a [facility] psychiatric provider: no    My [facility] psychiatric provider is: na    I am currently taking psychiatric medications: na    I feel that I will be ready for discharge from mental health care when I reach the following (measurable goal/objective): When I can reduce my anxiety to 2 times per week    For children and adults who have a legal guardian:   Has there been any change to custody orders and/or guardianship status? NA  If yes, attach updated documentation  I have created my Crisis Plan and have been offered a copy of this plan    [address]: Diagnosis and Treatment Plan explained to José Luis 'JAMES' Us acknowledges an understanding of their diagnosis  Kita Padgett agrees to this treatment plan      I have been offered a copy of this Treatment Plan  yes

## 2023-03-20 NOTE — PSYCH
Virtual Regular Visit    Verification of patient location:    Patient is located in the following state in which I hold an active license [place]      Assessment/Plan:    Problem List Items Addressed This Visit    None  Visit Diagnoses     Anxiety    -  Primary          Goals addressed in session: Goal 1          Reason for visit is   Chief Complaint   Patient presents with   • Virtual Regular Visit        Encounter provider Waldo Duenas LCSW    Provider located at [address] [address] [phone]      Recent Visits  No visits were found meeting these conditions  Showing recent visits within past 7 days and meeting all other requirements  Today's Visits  Date Type Provider Dept   03/20/23 Telemedicine Waldo Duenas LCSW [facility]   Showing today's visits and meeting all other requirements  Future Appointments  No visits were found meeting these conditions  Showing future appointments within next 150 days and meeting all other requirements       The patient was identified by name and date of birth  Qian Matt was informed that this is a telemedicine visit and that the visit is being conducted throughthe GuideWall platform  She agrees to proceed     My office door was closed  No one else was in the room  She acknowledged consent and understanding of privacy and security of the video platform  The patient has agreed to participate and understands they can discontinue the visit at any time  Patient is aware this is a billable service  Adelina Wei is a 20 y o  female who presents for follow up therapy session  Pt reported that Arnoldo Janine lost his job 2 weeks ago  He has been applying to all jobs that he qualifies for and has interviewed at a few other warehouses  In the meantime, he is doing [employer] and donating [employer]    Pool's mother is still with them so this has been stressful having 3 adults with only 1 income  The baby is is doing well and is now sleeping 8-hour stretches at night  This has allowed for pt to sleep more when she gets home from work  She has been getting out a bit more on the weekend as well  Pt re-applied for food stamps last week since [employer] lost is job and they are still waiting on his unemployment  Completed treatment and crisis plans this session  HPI     Past Medical History:   Diagnosis Date   • Allergic rhinitis    • Eczema    • Kidney stone    • Varicella     immunized       Past Surgical History:   Procedure Laterality Date   • APPENDECTOMY LAPAROSCOPIC N/A 11/2/2022    Procedure: APPENDECTOMY LAPAROSCOPIC, possible open;  Surgeon: Eliot Espinoza DO;  [facility];  Service: General   • WISDOM TOOTH EXTRACTION         Current Outpatient Medications   Medication Sig Dispense Refill   • ibuprofen (MOTRIN) 200 mg tablet Take 2 tablets (400 mg total) by mouth every 6 (six) hours as needed for mild pain Take with food  0   • norethindrone-ethinyl estradiol (Junel FE 1/20) 1-20 MG-MCG per tablet Take 1 tablet by mouth daily 84 tablet 3     No current facility-administered medications for this visit  No Known Allergies    Review of Systems    Video Exam    There were no vitals filed for this visit  Physical Exam     Behavioral Health Psychotherapy Progress Note    Psychotherapy Provided: Individual Psychotherapy     1  Anxiety            Goals addressed in session: Goal 1     DATA:   During this session, this clinician used the following therapeutic modalities: Engagement Strategies, Mindfulness-based Strategies and Supportive Psychotherapy    Substance Abuse was not addressed during this session   If the client is diagnosed with a co-occurring substance use disorder, please indicate any changes in the frequency or amount of use: na  Stage of change for addressing substance use diagnoses: No substance use/Not applicable    ASSESSMENT:  Dev Paul presents with a Euthymic/ normal mood  her affect is Normal range and intensity, which is congruent, with her mood and the content of the session  The client has made progress on their goals  Dev Paul presents with a none risk of suicide, none risk of self-harm, and none risk of harm to others  For any risk assessment that surpasses a "low" rating, a safety plan must be developed  A safety plan was indicated: no  If yes, describe in detail na    PLAN: Between sessions, Dev Paul will continue to utilize coping skills  At the next session, the therapist will use Engagement Strategies, Mindfulness-based Strategies and Supportive Psychotherapy to address anxiety  Behavioral Health Treatment Plan and Discharge Planning: Dev Paul is aware of and agrees to continue to work on their treatment plan  They have identified and are working toward their discharge goals   yes    Visit start and stop times:    03/20/23  Start Time: 1258  Stop Time: 1325  Total Visit Time: 27 minutes

## 2023-05-01 ENCOUNTER — OFFICE VISIT (OUTPATIENT)
Dept: INTERNAL MEDICINE CLINIC | Facility: CLINIC | Age: 23
End: 2023-05-01

## 2023-05-01 VITALS
HEIGHT: 64 IN | SYSTOLIC BLOOD PRESSURE: 106 MMHG | DIASTOLIC BLOOD PRESSURE: 70 MMHG | BODY MASS INDEX: 31.41 KG/M2 | OXYGEN SATURATION: 97 % | WEIGHT: 184 LBS | HEART RATE: 75 BPM | TEMPERATURE: 97.1 F

## 2023-05-01 DIAGNOSIS — Z00.00 ENCOUNTER FOR ANNUAL PHYSICAL EXAM: Primary | ICD-10-CM

## 2023-05-01 NOTE — PROGRESS NOTES
ADULT ANNUAL [address] [facility] [place]    NAME: Socorro Wen  AGE: 20 y o  SEX: female  : 2000     DATE: 2023     Assessment and Plan:     Problem List Items Addressed This Visit    None      Immunizations and preventive care screenings were discussed with patient today  Appropriate education was printed on patient's after visit summary  Counseling:  · Alcohol/drug use: discussed moderation in alcohol intake, the recommendations for healthy alcohol use, and avoidance of illicit drug use  No follow-ups on file  Chief Complaint:     Chief Complaint   Patient presents with    Physical Exam     She has eczema all over her body and when she wears gloves at work it gets worse  She needs a form completed for work stating she does not need to wear gloves  History of Present Illness:     Adult Annual Physical   Patient here for a comprehensive physical exam  The patient reports no problems  Diet and Physical Activity  · Diet/Nutrition: well balanced diet, limited junk food, low fat diet, consuming 3-5 servings of fruits/vegetables daily and adequate whole grain intake  · Exercise: walking  Depression Screening  PHQ-2/9 Depression Screening    Little interest or pleasure in doing things: 0 - not at all  Feeling down, depressed, or hopeless: 0 - not at all  PHQ-2 Score: 0  PHQ-2 Interpretation: Negative depression screen       General Health  · Sleep: gets 4-6 hours of sleep on average  · Hearing: normal - none   · Vision: no vision problems  · Dental: regular dental visits  /GYN Health  · Last menstrual period: 2023  · Contraceptive method: None  · History of STDs?: yes (Chlamydia)  Review of Systems:     Review of Systems   Constitutional: Negative  HENT: Negative  Eyes: Negative  Respiratory: Negative  Cardiovascular: Negative  Gastrointestinal: Negative      Endocrine: Negative  Genitourinary: Negative  Musculoskeletal: Negative  Skin: Rash: Eczema  Neurological: Positive for headaches (Occasional, mild)  Hematological: Negative  Psychiatric/Behavioral: Negative         Past Medical History:     Past Medical History:   Diagnosis Date    Allergic rhinitis     Eczema     Kidney stone     Varicella     immunized      Past Surgical History:     Past Surgical History:   Procedure Laterality Date    APPENDECTOMY LAPAROSCOPIC N/A 2022    Procedure: APPENDECTOMY LAPAROSCOPIC, possible open;  Surgeon: Cecilio Baugh DO;  [facility];  Service: General    WISDOM TOOTH EXTRACTION        Social History:     Social History     Socioeconomic History    Marital status: Single     Spouse name: None    Number of children: None    Years of education: None    Highest education level: None   Occupational History    None   Tobacco Use    Smoking status: Former     Packs/day: 0 25     Years: 3 00     Pack years: 0 75     Types: Cigarettes     Start date:      Quit date:      Years since quittin 3    Smokeless tobacco: Never    Tobacco comments:     vapes   Vaping Use    Vaping Use: Former    Quit date: 6/15/2021    Substances: Nicotine   Substance and Sexual Activity    Alcohol use: Yes     Comment: 1-2 drinks on a rare occasion    Drug use: No    Sexual activity: Yes     Partners: Male   Other Topics Concern    None   Social History Narrative    None     Social Determinants of Health     Financial Resource Strain: Not on file   Food Insecurity: No Food Insecurity    Worried About Running Out of Food in the Last Year: Never true    Klaudia of Food in the Last Year: Never true   Transportation Needs: Not on file   Physical Activity: Not on file   Stress: Not on file   Social Connections: Not on file   Intimate Partner Violence: Not on file   Housing Stability: [address] Unable to Pay for Housing in the Last Year: No    Number of "Places Lived in the Last Year: 1    Unstable Housing in the Last Year: No      Family History:     Family History   Problem Relation Age of Onset    No Known Problems Mother     No Known Problems Father     No Known Problems Brother     No Known Problems Brother     Mental illness Neg Hx     Substance Abuse Neg Hx       Current Medications:     Current Outpatient Medications   Medication Sig Dispense Refill    norethindrone-ethinyl estradiol (Junel FE 1/20) 1-20 MG-MCG per tablet Take 1 tablet by mouth daily 84 tablet 3    ibuprofen (MOTRIN) 200 mg tablet Take 2 tablets (400 mg total) by mouth every 6 (six) hours as needed for mild pain Take with food  0     No current facility-administered medications for this visit  Allergies:     No Known Allergies   Physical Exam:     /70 (BP Location: Left arm, Patient Position: Sitting, Cuff Size: Large)   Pulse 75   Temp (!) 97 1 °F (36 2 °C) (Tympanic)   Ht 5' 4\" (1 626 m)   Wt 83 5 kg (184 lb)   SpO2 97%   BMI 31 58 kg/m²     Physical Exam  Constitutional:       Appearance: Normal appearance  She is obese  HENT:      Head: Normocephalic and atraumatic  Right Ear: Tympanic membrane, ear canal and external ear normal  There is no impacted cerumen  Left Ear: Tympanic membrane, ear canal and external ear normal  There is no impacted cerumen  Nose: Nose normal  No congestion or rhinorrhea  Mouth/Throat:      Mouth: Mucous membranes are dry  Pharynx: Oropharynx is clear  No oropharyngeal exudate or posterior oropharyngeal erythema  Comments: Teeth in good repair  Eyes:      General: No scleral icterus  Extraocular Movements: Extraocular movements intact  Conjunctiva/sclera: Conjunctivae normal       Pupils: Pupils are equal, round, and reactive to light  Neck:      Vascular: No carotid bruit  Cardiovascular:      Rate and Rhythm: Normal rate and regular rhythm  Heart sounds: Normal heart sounds   No " murmur heard  No friction rub  Pulmonary:      Effort: Pulmonary effort is normal  No respiratory distress  Breath sounds: Normal breath sounds  Abdominal:      General: Abdomen is flat  There is no distension  Tenderness: There is no abdominal tenderness  There is no guarding  Musculoskeletal:         General: No swelling or tenderness  Cervical back: Neck supple  No rigidity or tenderness  Right lower leg: No edema  Left lower leg: No edema  Lymphadenopathy:      Cervical: No cervical adenopathy  Skin:     General: Skin is warm  Capillary Refill: Capillary refill takes less than 2 seconds  Findings: Rash (Patchy areas of eczema involving the dorsum of both hands and fingers, scattered patches on both upper extremities some perioral involvement well as scattered areas on both lower extremities) present  Neurological:      General: No focal deficit present  Mental Status: She is alert and oriented to person, place, and time  Mental status is at baseline     Psychiatric:         Mood and Affect: Mood normal          Behavior: Behavior normal           Hector Alarcon MD     [facility]

## 2023-05-04 ENCOUNTER — TELEPHONE (OUTPATIENT)
Dept: INTERNAL MEDICINE CLINIC | Facility: CLINIC | Age: 23
End: 2023-05-04

## 2023-05-04 NOTE — TELEPHONE ENCOUNTER
Patient was seen on Monday for a physical for work  States her job is requesting a return to work note that states patient is allowed to work with no restrictions, but is able to wear gloves at work due to eczema  Clerical staff will write letter, but wanted to double check with Provider

## 2023-05-15 NOTE — TELEPHONE ENCOUNTER
Called patient 3x (5/4, 5/5, & 5/15) to get correct information regarding letter  LEFT MESSAGE ON MACHINE to call office back

## 2023-05-24 ENCOUNTER — TELEPHONE (OUTPATIENT)
Dept: OBGYN CLINIC | Facility: CLINIC | Age: 23
End: 2023-05-24

## 2023-06-22 DIAGNOSIS — Z30.011 ORAL CONTRACEPTION INITIAL PRESCRIPTION: ICD-10-CM

## 2023-06-22 RX ORDER — NORETHINDRONE ACETATE AND ETHINYL ESTRADIOL 1MG-20(21)
1 KIT ORAL DAILY
Qty: 84 TABLET | Refills: 1 | Status: SHIPPED | OUTPATIENT
Start: 2023-06-22 | End: 2023-09-14

## 2023-06-22 NOTE — TELEPHONE ENCOUNTER
Patient is requesting a 90 day supply of her BC be sent to the [facility] [id number] in [place]  She was last seen on 10/3/22 and canceled/no showed her next 2 appointments  She currently has no future appointments scheduled at this time  Thank you

## 2023-07-11 ENCOUNTER — APPOINTMENT (OUTPATIENT)
Dept: RADIOLOGY | Age: 23
End: 2023-07-11
Attending: PHYSICIAN ASSISTANT
Payer: COMMERCIAL

## 2023-07-11 ENCOUNTER — OFFICE VISIT (OUTPATIENT)
Dept: URGENT CARE | Age: 23
End: 2023-07-11
Payer: COMMERCIAL

## 2023-07-11 VITALS
HEART RATE: 74 BPM | TEMPERATURE: 97.5 F | BODY MASS INDEX: 31.24 KG/M2 | WEIGHT: 182 LBS | DIASTOLIC BLOOD PRESSURE: 66 MMHG | RESPIRATION RATE: 20 BRPM | OXYGEN SATURATION: 98 % | SYSTOLIC BLOOD PRESSURE: 121 MMHG

## 2023-07-11 DIAGNOSIS — R52 PAIN: ICD-10-CM

## 2023-07-11 DIAGNOSIS — M77.50 TENDINITIS OF ANKLE OR FOOT: Primary | ICD-10-CM

## 2023-07-11 PROCEDURE — 99213 OFFICE O/P EST LOW 20 MIN: CPT | Performed by: PHYSICIAN ASSISTANT

## 2023-07-11 PROCEDURE — 73630 X-RAY EXAM OF FOOT: CPT

## 2023-07-11 NOTE — PROGRESS NOTES
[facility]        NAME: Nando Andres is a 20 y.o. female  : 2000    MRN: [id number]  DATE: 2023  TIME: 8:13 PM    Assessment and Plan   Tendinitis of ankle or foot [M77.50]  1. Tendinitis of ankle or foot  XR foot 3+ vw left            Patient Instructions       Follow up with PCP in 3-5 days. Proceed to  ER if symptoms worsen. Chief Complaint     Chief Complaint   Patient presents with   • Foot Pain     Left foot pain and can not put pressure on her foot it's off/ on been for a couple of months. In the passed 2 days it's getting worse . ( No injury)         History of Present Illness       Patient here for evaluation of pain in her left foot and ankle which has been giving out on her for the past couple months. She states that becoming more frequent. She denies any known injury or trauma to the foot or ankle. She states she will be walking and they will feel weak and give out. She has almost fallen a couple times due to the foot and ankle giving out. She denies any known swelling. Review of Systems   Review of Systems   Constitutional: Negative. Musculoskeletal: Positive for gait problem. Negative for arthralgias, back pain, joint swelling, myalgias, neck pain and neck stiffness. Skin: Negative. Neurological: Negative for syncope, weakness and numbness. Current Medications       Current Outpatient Medications:   •  ibuprofen (MOTRIN) 200 mg tablet, Take 2 tablets (400 mg total) by mouth every 6 (six) hours as needed for mild pain Take with food. , Disp: , Rfl: 0  •  norethindrone-ethinyl estradiol (Junel FE 1/20) 1-20 MG-MCG per tablet, Take 1 tablet by mouth daily, Disp: 84 tablet, Rfl: 1    Current Allergies     Allergies as of 2023   • (No Known Allergies)            The following portions of the patient's history were reviewed and updated as appropriate: allergies, current medications, past family history, past medical history, past social history, past surgical history and problem list.     Past Medical History:   Diagnosis Date   • Allergic rhinitis    • Eczema    • Kidney stone    • Varicella     immunized       Past Surgical History:   Procedure Laterality Date   • APPENDECTOMY LAPAROSCOPIC N/A 11/2/2022    Procedure: APPENDECTOMY LAPAROSCOPIC, possible open;  Surgeon: Isela Gongora DO;  [facility];  Service: General   • WISDOM TOOTH EXTRACTION         Family History   Problem Relation Age of Onset   • No Known Problems Mother    • No Known Problems Father    • No Known Problems Brother    • No Known Problems Brother    • Mental illness Neg Hx    • Substance Abuse Neg Hx          Medications have been verified. Objective   /66   Pulse 74   Temp 97.5 °F (36.4 °C) (Temporal)   Resp 20   Wt 82.6 kg (182 lb)   SpO2 98%   BMI 31.24 kg/m²   No LMP recorded. (Menstrual status: Birth Control). Physical Exam     Physical Exam  Vitals and nursing note reviewed. Constitutional:       General: She is not in acute distress. Appearance: Normal appearance. She is well-developed. She is not ill-appearing, toxic-appearing or diaphoretic. HENT:      Head: Normocephalic and atraumatic. Eyes:      Extraocular Movements: Extraocular movements intact. Conjunctiva/sclera: Conjunctivae normal.      Pupils: Pupils are equal, round, and reactive to light. Musculoskeletal:      Comments: Full range of motion of the left foot and ankle. Mild discomfort to the dorsal aspect of the foot. No soft tissue swelling. No erythema. No warmth. No ecchymosis. No laxity. Strength 5 out of 5. Negative straight leg raise bilaterally to 90 degrees seated. Skin:     General: Skin is warm and dry. Findings: No rash. Neurological:      General: No focal deficit present. Mental Status: She is alert and oriented to person, place, and time.    Psychiatric:         Mood and Affect: Mood normal.         Behavior: Behavior normal. Thought Content: Thought content normal.         Judgment: Judgment normal.       X-ray shows no acute fractures or findings.

## 2023-07-11 NOTE — PATIENT INSTRUCTIONS
Rest injured body part. Ice or heat as needed    CAM boot as directed     Elevate injured body part as able to help decrease pain and swelling. Follow-up with orthopedics for further evaluation.  [phone]

## 2023-07-14 ENCOUNTER — APPOINTMENT (OUTPATIENT)
Dept: URGENT CARE | Facility: CLINIC | Age: 23
End: 2023-07-14

## 2023-07-14 DIAGNOSIS — Z02.1 PRE-EMPLOYMENT HEALTH SCREENING EXAMINATION: Primary | ICD-10-CM

## 2023-07-14 DIAGNOSIS — Z30.011 ORAL CONTRACEPTION INITIAL PRESCRIPTION: ICD-10-CM

## 2023-07-14 LAB — RUBV IGG SERPL IA-ACNC: 32 IU/ML

## 2023-07-14 PROCEDURE — 86480 TB TEST CELL IMMUN MEASURE: CPT

## 2023-07-14 PROCEDURE — 86762 RUBELLA ANTIBODY: CPT

## 2023-07-14 PROCEDURE — 86765 RUBEOLA ANTIBODY: CPT

## 2023-07-14 PROCEDURE — 86735 MUMPS ANTIBODY: CPT

## 2023-07-14 PROCEDURE — 86787 VARICELLA-ZOSTER ANTIBODY: CPT

## 2023-07-14 RX ORDER — NORETHINDRONE ACETATE AND ETHINYL ESTRADIOL 1MG-20(21)
1 KIT ORAL DAILY
Qty: 84 TABLET | Refills: 0 | Status: CANCELLED | OUTPATIENT
Start: 2023-07-14 | End: 2023-10-06

## 2023-07-15 LAB
MEV IGG SER QL IA: NORMAL
MUV IGG SER QL IA: NORMAL
VZV IGG SER QL IA: NORMAL

## 2023-07-17 LAB
GAMMA INTERFERON BACKGROUND BLD IA-ACNC: 0.04 IU/ML
M TB IFN-G BLD-IMP: NEGATIVE
M TB IFN-G CD4+ BCKGRND COR BLD-ACNC: 0.01 IU/ML
M TB IFN-G CD4+ BCKGRND COR BLD-ACNC: 0.04 IU/ML
MITOGEN IGNF BCKGRD COR BLD-ACNC: 3.44 IU/ML

## 2023-07-20 ENCOUNTER — OFFICE VISIT (OUTPATIENT)
Dept: OBGYN CLINIC | Facility: OTHER | Age: 23
End: 2023-07-20
Payer: COMMERCIAL

## 2023-07-20 VITALS
HEART RATE: 78 BPM | SYSTOLIC BLOOD PRESSURE: 126 MMHG | WEIGHT: 182 LBS | HEIGHT: 64 IN | BODY MASS INDEX: 31.07 KG/M2 | DIASTOLIC BLOOD PRESSURE: 84 MMHG

## 2023-07-20 DIAGNOSIS — M25.672 ANKLE STIFF, LEFT: ICD-10-CM

## 2023-07-20 DIAGNOSIS — R29.898 ANKLE WEAKNESS: ICD-10-CM

## 2023-07-20 DIAGNOSIS — M79.672 PAIN IN LEFT FOOT: Primary | ICD-10-CM

## 2023-07-20 PROCEDURE — 99203 OFFICE O/P NEW LOW 30 MIN: CPT | Performed by: PHYSICIAN ASSISTANT

## 2023-07-20 NOTE — PATIENT INSTRUCTIONS
P. R.I.C.E. Treatment   WHAT YOU NEED TO KNOW:   What is P.R.I.C.E. treatment? P.R.I.C.E. treatment is a 5-step process used to decrease swelling and pain caused by an injury. P.R.I.C.E. stands for protect, rest, ice, compress, and elevate. Start P.R.I.C.E. within 24 to 48 hours of an injury. How do I use P.R.I.C.E. treatment? Protect  your injury from more damage. Support the injured area with a brace or splint. Your healthcare provider will tell you how long to use the brace or splint. Rest  your injured area as directed. You may need to stop using, or keep weight off, the injury for 48 hours or longer. Your healthcare provider may recommend crutches or another device. Return to your usual activities as directed. Apply ice  on your injured area for 15 to 20 minutes every 4 hours or as directed. Use an ice pack, or put crushed ice in a plastic bag. Cover the bag with a towel before you apply it to your skin. Ice helps prevent tissue damage and decreases swelling and pain. Compress  (keep pressure on) the injured area. Compression will help decrease swelling and support the injured area. Use an elastic bandage, air stirrup, splint, or sling as directed. If you use an elastic bandage, make sure the bandage is not too tight. You should be able to slip 2 fingers between the bandage and your skin. Elevate  the injured area above the level of your heart as often as you can. This will help decrease swelling and pain. Prop the injured area on pillows or blankets to keep it elevated comfortably. When should I seek immediate care? Your pain is severe. You have severe swelling or deformity. You have numbness in the injured area. When should I call my doctor? Your pain and swelling do not go away after a few days. You have questions or concerns about your condition or care. CARE AGREEMENT:   You have the right to help plan your care.  Learn about your health condition and how it may be treated. Discuss treatment options with your healthcare providers to decide what care you want to receive. You always have the right to refuse treatment. The above information is an  only. It is not intended as medical advice for individual conditions or treatments. Talk to your doctor, nurse or pharmacist before following any medical regimen to see if it is safe and effective for you. © Copyright [employer] 2022 Information is for End User's use only and may not be sold, redistributed or otherwise used for commercial purposes.

## 2023-07-20 NOTE — PROGRESS NOTES
Orthopaedic Surgery - Office Note  Catina Rutherford (20 y.o. female)   : 2000   MRN: [id number]  Encounter Date: 2023    Chief Complaint   Patient presents with   • Left Ankle - Pain         Assessment/Plan  Diagnoses and all orders for this visit:    Pain in left foot    Ankle weakness    Ankle stiff, left    Diagnosis as well as treatment options were reviewed with the patient in the office today. She was advised this condition should not require surgical intervention but would benefit from formal physical therapy to work on improving range of motion, strength, and decreasing symptoms. Patient will ice the ankle for comfort 20 minutes on 1 hour off 3 times a day. Patient may utilize Aleve 1 tablet twice daily with food stopping and calling if any stomach upset occurs. Return for Recheck in 3 to 4 weeks with sports medicine. History of Present Illness  This is a new patient with ongoing left foot pain. Patient has had several month history of discomfort in the foot with weightbearing. It acutely worsened from 2023 to 2023 resulting in an urgent care visit. She had x-rays taken of the foot at that time which were negative for pathology. She cannot recall any specific trauma to the foot or ankle. She reports the foot is giving out on her several times in the past few months resulting in near falls but never an actual fall. She has not noticed any significant swelling or ecchymosis. No paresthesias are reported. She has been using ibuprofen with food without relief. She was diagnosed with ankle tendinitis at the urgent care and advised to follow-up for evaluation and treatment today. Patient was given a boot at the urgent care but patient states it is cumbersome and difficulty to utilize while dealing with her 3year-old. 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     Review of Systems  Pertinent items are noted in HPI. All other systems were reviewed and are negative. Physical Exam  There were no vitals taken for this visit. Cons: Appears well. No apparent distress. Psych: Alert. Oriented x3. Mood and affect normal.    Patient's left foot is with skin breakdown lesion signs of infection or soft tissue edema. She has no tenderness at the midfoot or fifth metatarsal.  She has mild tenderness over the anterior and lateral ankle. She has mild tenderness over the ATFL. There is no tenderness over the CFL or deltoid ligament. She is nontender on the medial and lateral malleolus. Patient has well-maintained plantarflexion and dorsiflexion. She has some limited range of motion to inversion and eversion. Dorsiflexion strength is 5 out of 5. Plantarflexion strength is 5 out of 5. Inversion and eversion is decreased to 4 out of 5 with mild pain against resistance. There is no tenderness to palpation at the calcaneus or plantar fascia. There is no instability or pain to anterior drawer. There is no calf tenderness and a negative Homans. There is no tenderness in the Achilles. Dorsalis pedis and posterior tibial pulses are +2. Gait is within normal limits             Studies Reviewed  Study Result    Narrative & Impression   LEFT FOOT     INDICATION:   R52: Pain, unspecified.     COMPARISON:  None     VIEWS:  XR FOOT 3+ VW LEFT  Images: 3     FINDINGS:     There is no acute fracture or dislocation.     No significant degenerative changes.     No lytic or blastic osseous lesion.     Soft tissues are unremarkable.     IMPRESSION:     No acute osseous abnormality.           Workstation performed: [id number]     X-ray images as well as reports were reviewed by myself in the office today and I agree neurologist interpretation. Office notes from 7/11/2023 were reviewed by myself in the office today for this visit. Procedures  No procedures today.     Medical, Surgical, Family, and Social History  The patient's medical history, family history, and social history, were reviewed and updated as appropriate. Past Medical History:   Diagnosis Date   • Allergic rhinitis    • Eczema    • Kidney stone    • Varicella     immunized       Past Surgical History:   Procedure Laterality Date   • APPENDECTOMY LAPAROSCOPIC N/A 2022    Procedure: APPENDECTOMY LAPAROSCOPIC, possible open;  Surgeon: Gabby Hernandez DO;  Location: [facility];  Service: General   • WISDOM TOOTH EXTRACTION         Family History   Problem Relation Age of Onset   • No Known Problems Mother    • No Known Problems Father    • No Known Problems Brother    • No Known Problems Brother    • Mental illness Neg Hx    • Substance Abuse Neg Hx        Social History     Occupational History   • Not on file   Tobacco Use   • Smoking status: Former     Packs/day: 0.25     Years: 3.00     Total pack years: 0.75     Types: Cigarettes     Start date:      Quit date:      Years since quittin.5   • Smokeless tobacco: Never   • Tobacco comments:     vapes   Vaping Use   • Vaping Use: Former   • Quit date: 6/15/2021   • Substances: Nicotine   Substance and Sexual Activity   • Alcohol use: Yes     Comment: 1-2 drinks on a rare occasion   • Drug use: No   • Sexual activity: Yes     Partners: Male       No Known Allergies      Current Outpatient Medications:   •  ibuprofen (MOTRIN) 200 mg tablet, Take 2 tablets (400 mg total) by mouth every 6 (six) hours as needed for mild pain Take with food. , Disp: , Rfl: 0  •  norethindrone-ethinyl estradiol (Junel FE 1/20) 1-20 MG-MCG per tablet, Take 1 tablet by mouth daily, Disp: 84 tablet, Rfl: 1      Dax Black PA-C

## 2023-08-14 ENCOUNTER — APPOINTMENT (EMERGENCY)
Dept: RADIOLOGY | Facility: HOSPITAL | Age: 23
End: 2023-08-14
Payer: COMMERCIAL

## 2023-08-14 ENCOUNTER — HOSPITAL ENCOUNTER (EMERGENCY)
Facility: HOSPITAL | Age: 23
Discharge: HOME | End: 2023-08-14
Attending: EMERGENCY MEDICINE
Payer: COMMERCIAL

## 2023-08-14 VITALS
OXYGEN SATURATION: 98 % | DIASTOLIC BLOOD PRESSURE: 76 MMHG | TEMPERATURE: 98 F | RESPIRATION RATE: 18 BRPM | SYSTOLIC BLOOD PRESSURE: 145 MMHG | HEART RATE: 75 BPM

## 2023-08-14 DIAGNOSIS — T14.8XXA CONTUSION OF LEFT CLAVICLE, INITIAL ENCOUNTER: Primary | ICD-10-CM
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
## 2023-08-14 PROCEDURE — 99284 EMERGENCY DEPT VISIT MOD MDM: CPT | Performed by: EMERGENCY MEDICINE

## 2023-08-14 PROCEDURE — 73000 X-RAY EXAM OF COLLAR BONE: CPT

## 2023-08-14 PROCEDURE — 99284 EMERGENCY DEPT VISIT MOD MDM: CPT

## 2023-08-14 RX ORDER — DIAZEPAM 2 MG/1
2 TABLET ORAL ONCE
Status: COMPLETED | OUTPATIENT
Start: 2023-08-14 | End: 2023-08-14

## 2023-08-14 RX ORDER — BACLOFEN 10 MG/1
10 TABLET ORAL ONCE
Status: DISCONTINUED | OUTPATIENT
Start: 2023-08-14 | End: 2023-08-14

## 2023-08-14 RX ORDER — NAPROXEN 250 MG/1
250 TABLET ORAL ONCE
Status: COMPLETED | OUTPATIENT
Start: 2023-08-14 | End: 2023-08-14

## 2023-08-14 RX ADMIN — NAPROXEN 250 MG: 250 TABLET ORAL at 22:08

## 2023-08-14 RX ADMIN — DIAZEPAM 2 MG: 2 TABLET ORAL at 22:08

## 2023-08-14 NOTE — Clinical Note
Christine Orshaan was seen and treated in our emergency department on 8/14/2023. Nonweightbearing to left arm for 24 hours, then weightbearing as tolerated    Diagnosis: Left Clavicle Contusion    Socorro  is off the rest of the shift today. She may return on this date: 08/15/2023         If you have any questions or concerns, please don't hesitate to call.       Marcelo Turcios, DO    ______________________________           _______________          _______________  Hospital Representative                              Date                                Time
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
## 2023-08-15 NOTE — DISCHARGE INSTRUCTIONS
You can take Acetaminophen (Tylenol) 975mg every six hours for up to three doses per day for one week maximum, do not take more than 3000mg in 24 hours. You can take ibuprofen (Motrin/Advil) 400mg every six hours for up to three doses per day for one week maximum, do not take with any other NSAIDs (aspirin/Aleve/etc). You need to follow up with your primary care doctor for further evaluation and management.

## 2023-08-16 LAB
DME PARACHUTE DELIVERY DATE ACTUAL: NORMAL
DME PARACHUTE DELIVERY DATE REQUESTED: NORMAL
DME PARACHUTE ITEM DESCRIPTION: NORMAL
DME PARACHUTE ORDER STATUS: NORMAL
DME PARACHUTE SUPPLIER NAME: NORMAL
DME PARACHUTE SUPPLIER PHONE: NORMAL

## 2023-08-18 ENCOUNTER — TELEPHONE (OUTPATIENT)
Dept: INTERNAL MEDICINE CLINIC | Facility: CLINIC | Age: 23
End: 2023-08-18

## 2023-08-18 NOTE — TELEPHONE ENCOUNTER
Pt is scheduled to see [facility] on 8/23/23 for a Consult appt. Can you please put in a new order?

## 2023-08-18 NOTE — TELEPHONE ENCOUNTER
We have not seen patient since March 2022, she would need an office visit.  Please call and schedule

## 2023-08-18 NOTE — TELEPHONE ENCOUNTER
It looks like she had one in 2020 for:  Lesion of skin of breast (L98.8 [ICD-10-CM])   Lesion 1 cm abnormal borders, superficial, smooth of left berast at 1:00 position, additional 12:00 lesion similar characterization, new
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
## 2023-08-23 ENCOUNTER — CONSULT (OUTPATIENT)
Dept: MULTI SPECIALTY CLINIC | Facility: CLINIC | Age: 23
End: 2023-08-23

## 2023-08-23 VITALS — HEIGHT: 64 IN | WEIGHT: 182 LBS | BODY MASS INDEX: 31.07 KG/M2

## 2023-08-23 DIAGNOSIS — L30.9 HAND DERMATITIS: Primary | ICD-10-CM

## 2023-08-23 PROCEDURE — 99213 OFFICE O/P EST LOW 20 MIN: CPT | Performed by: STUDENT IN AN ORGANIZED HEALTH CARE EDUCATION/TRAINING PROGRAM

## 2023-08-23 NOTE — PROGRESS NOTES
[facility] Note     Patient Name: Kim Hazel  Encounter Date: 8/20/23     Have you been cared for by a [facility]ologist in the last 3 years and, if so, which description applies to you? Yes. I have been here within the last 3 years, and my medical history has NOT changed since that time. I am FEMALE/of child-bearing potential.    REVIEW OF SYSTEMS:  Have you recently had or currently have any of the following? · No changes in my recent health. PAST MEDICAL HISTORY:  Have you personally ever had or currently have any of the following? If "YES," then please provide more detail. · No changes in my medical history. FAMILY HISTORY:  Any "first degree relatives" (parent, brother, sister, or child) with the following? • No changes in my family's known health. PATIENT EXPERIENCE:    • Do you want the Dermatologist to perform a COMPLETE skin exam today including a clinical examination under the "bra and underwear" areas? NO  • If necessary, do we have your permission to call and leave a detailed message on your Preferred Phone number that includes your specific medical information? YES      No Known Allergies   Current Outpatient Medications:   •  ibuprofen (MOTRIN) 200 mg tablet, Take 2 tablets (400 mg total) by mouth every 6 (six) hours as needed for mild pain Take with food. , Disp: , Rfl: 0  •  norethindrone-ethinyl estradiol (Junel FE 1/20) 1-20 MG-MCG per tablet, Take 1 tablet by mouth daily, Disp: 84 tablet, Rfl: 1          • Whom besides the patient is providing clinical information about today's encounter?   o NO ADDITIONAL HISTORIAN (patient alone provided history)    Physical Exam and Assessment/Plan by Diagnosis:    Hand Dermatitis  Physical Exam:  • Anatomic Location Affected: Hands  • Morphological Description:  Red plaques with lichenification and fissures  • Pertinent Positives:  • Pertinent Negatives:     Additional History of Present Condition:  Patient has history of hand dermatitis since childhood. Was previously well-controlled until pregnancy. Has been using daughter's triamcinolone to control flares which has worked well. She moisturizes hands with lotion multiple times a day. 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     Assessment and Plan:  Based on a thorough discussion of this condition and the management approach to it (including a comprehensive discussion of the known risks, side effects and potential benefits of treatment), the patient (family) agrees to implement the following specific plan:  • Discussed importance of moisturizing with creams or ointments not lotion  • Will prescribe triamcinolone for flares  • Limit hand washing and wear task-appropriate gloves for protection Yes

## 2023-09-20 ENCOUNTER — ANNUAL EXAM (OUTPATIENT)
Dept: OBGYN CLINIC | Facility: CLINIC | Age: 23
End: 2023-09-20
Payer: COMMERCIAL

## 2023-09-20 VITALS
BODY MASS INDEX: 31.48 KG/M2 | SYSTOLIC BLOOD PRESSURE: 102 MMHG | WEIGHT: 184.4 LBS | DIASTOLIC BLOOD PRESSURE: 78 MMHG | HEIGHT: 64 IN

## 2023-09-20 DIAGNOSIS — Z12.4 ENCOUNTER FOR SCREENING FOR MALIGNANT NEOPLASM OF CERVIX: ICD-10-CM

## 2023-09-20 DIAGNOSIS — Z01.419 WELL WOMAN EXAM WITH ROUTINE GYNECOLOGICAL EXAM: Primary | ICD-10-CM

## 2023-09-20 DIAGNOSIS — Z30.41 ENCOUNTER FOR SURVEILLANCE OF CONTRACEPTIVE PILLS: ICD-10-CM

## 2023-09-20 PROCEDURE — G0145 SCR C/V CYTO,THINLAYER,RESCR: HCPCS | Performed by: OBSTETRICS & GYNECOLOGY

## 2023-09-20 PROCEDURE — S0612 ANNUAL GYNECOLOGICAL EXAMINA: HCPCS | Performed by: OBSTETRICS & GYNECOLOGY

## 2023-09-20 RX ORDER — NORETHINDRONE ACETATE AND ETHINYL ESTRADIOL 1MG-20(21)
1 KIT ORAL DAILY
Qty: 84 TABLET | Refills: 4 | Status: SHIPPED | OUTPATIENT
Start: 2023-09-20

## 2023-09-20 NOTE — PROGRESS NOTES
ASSESSMENT & PLAN:   Diagnoses and all orders for this visit:    Well woman exam with routine gynecological exam  -     Liquid-based pap, screening    Encounter for screening for malignant neoplasm of cervix  -     Liquid-based pap, screening    Encounter for surveillance of contraceptive pills  -     norethindrone-ethinyl estradiol (Junel FE 1/20) 1-20 MG-MCG per tablet; Take 1 tablet by mouth daily        The following were reviewed in today's visit: ASCCP guidelines, Gardisil vaccination, STD testing breast self exam, use and side effects of OCPs, family planning choices, exercise and healthy diet. Patient to return to office in yearly for annual exam.     All questions have been answered to her satisfaction. CC:  Annual Gynecologic Examination  Chief Complaint   Patient presents with   • Gynecologic Exam     Annual exam, pap not indicated. Pt is well, states there are no gyn concerns at this time. ASCUS pap/neg HPV 21  mammo not indicated    • Medication Refill     Junel        HPI: Isacc Valencia is a 24 y.o. Eufemia Brome who presents for annual gynecologic examination. She has the following concerns:  No gyn concerns. Needs refill of OCPs. Health Maintenance:    Exercise: infrequently  Breast exams/breast awareness: yes    Past Medical History:   Diagnosis Date   • Allergic rhinitis    • Eczema    • Kidney stone    •  (spontaneous vaginal delivery) 2022   • Varicella     immunized       Past Surgical History:   Procedure Laterality Date   • APPENDECTOMY LAPAROSCOPIC N/A 2022    Procedure: APPENDECTOMY LAPAROSCOPIC, possible open;  Surgeon: Pamela Croft DO;  Location: [facility];  Service: General   • WISDOM TOOTH EXTRACTION         Past OB/Gyn History:  Period Cycle (Days): 28  Period Duration (Days): 5  Period Pattern: Regular  Menstrual Flow: Moderate  Menstrual Control: Thin pad, Panty liner, Maxi padPatient's last menstrual period was 2023 (approximate).     Last Pap:  : ASCUS with NEGATIVE high risk HPV  History of abnormal Pap smear: yes  HPV vaccine completed: yes    Patient is currently sexually active.    STD testing: no  Current contraception: OCP (estrogen/progesterone)      Family History  Family History   Problem Relation Age of Onset   • No Known Problems Mother    • No Known Problems Father    • No Known Problems Brother    • No Known Problems Brother    • Mental illness Neg Hx    • Substance Abuse Neg Hx        Family history of uterine or ovarian cancer: no  Family history of breast cancer: no  Family history of colon cancer: no    Social History:  Social History     Socioeconomic History   • Marital status: Single     Spouse name: Not on file   • Number of children: Not on file   • Years of education: Not on file   • Highest education level: Not on file   Occupational History   • Not on file   Tobacco Use   • Smoking status: Former     Packs/day: 0.25     Years: 3.00     Total pack years: 0.75     Types: Cigarettes     Start date:      Quit date:      Years since quittin.7   • Smokeless tobacco: Never   • Tobacco comments:     vapes   Vaping Use   • Vaping Use: Former   • Quit date: 6/15/2021   • Substances: Nicotine   Substance and Sexual Activity   • Alcohol use: Yes     Comment: 1-2 drinks on a rare occasion   • Drug use: No   • Sexual activity: Yes     Partners: Male     Birth control/protection: OCP   Other Topics Concern   • Not on file   Social History Narrative   • Not on file     Social Determinants of Health     Financial Resource Strain: Not on file   Food Insecurity: No Food Insecurity (2022)    Hunger Vital Sign    • Worried About Running Out of Food in the Last Year: Never true    • Ran Out of Food in the Last Year: Never true   Transportation Needs: Not on file   Physical Activity: Not on file   Stress: Not on file   Social Connections: Not on file   Intimate Partner Violence: Not on file   Housing Stability: Low Risk (11/2/2022)    Housing Stability Vital Sign    • Unable to Pay for Housing in the Last Year: No    • Number of Places Lived in the Last Year: 1    • Unstable Housing in the Last Year: No     Domestic violence screen: negative    Allergies:  No Known Allergies    Medications:    Current Outpatient Medications:   •  ibuprofen (MOTRIN) 200 mg tablet, Take 2 tablets (400 mg total) by mouth every 6 (six) hours as needed for mild pain Take with food. , Disp: , Rfl: 0  •  norethindrone-ethinyl estradiol (Junel FE 1/20) 1-20 MG-MCG per tablet, Take 1 tablet by mouth daily, Disp: 84 tablet, Rfl: 4  •  triamcinolone (KENALOG) 0.1 % ointment, Apply topically 2 (two) times a day, Disp: 30 g, Rfl: 3    Review of Systems:  Review of Systems   Constitutional: Negative for activity change, appetite change and unexpected weight change. Respiratory: Negative for cough and shortness of breath. Cardiovascular: Negative for chest pain. Gastrointestinal: Negative for abdominal pain, constipation, diarrhea, nausea and vomiting. Genitourinary: Negative for difficulty urinating, dyspareunia, frequency, menstrual problem, pelvic pain, urgency, vaginal bleeding, vaginal discharge and vaginal pain. Musculoskeletal: Negative for back pain. Skin: Negative. Neurological: Negative for dizziness, weakness, light-headedness and headaches. Psychiatric/Behavioral: Negative. Physical Exam:  /78 (BP Location: Left arm, Patient Position: Sitting, Cuff Size: Standard)   Ht 5' 4" (1.626 m)   Wt 83.6 kg (184 lb 6.4 oz)   LMP 09/01/2023 (Approximate)   Breastfeeding No   BMI 31.65 kg/m²    Physical Exam  Constitutional:       General: She is not in acute distress. Appearance: Normal appearance. She is well-developed. She is obese. She is not diaphoretic. Genitourinary:      Vulva and bladder normal.      No lesions in the vagina.       Genitourinary Comments: Perineum normal in appearance, no lacerations, no ulcerations, no lesions visualized. Right Labia: No rash, tenderness or lesions. Left Labia: No tenderness, lesions or rash. No inguinal adenopathy present in the right or left side. No vaginal discharge, erythema, tenderness or bleeding. No vaginal prolapse present. No vaginal atrophy present. Right Adnexa: not tender, not full and no mass present. Left Adnexa: not tender, not full and no mass present. Cervix is parous. No cervical motion tenderness, discharge, friability, lesion or polyp. No parametrium nodularity or thickening present. Uterus is not enlarged, tender or prolapsed. No uterine mass detected. Uterus is midaxial.      No urethral prolapse or mass present. Bladder is not tender. Pelvic exam was performed with patient in the lithotomy position. Rectum:      No tenderness or external hemorrhoid. Breasts:     Breasts are symmetrical.      Right: No swelling, bleeding, mass, skin change or tenderness. Left: No swelling, bleeding, mass, skin change or tenderness. HENT:      Head: Normocephalic and atraumatic. Neck:      Thyroid: No thyromegaly or thyroid tenderness. Cardiovascular:      Rate and Rhythm: Normal rate and regular rhythm. Heart sounds: Normal heart sounds. No murmur heard. No friction rub. Pulmonary:      Effort: Pulmonary effort is normal. No respiratory distress. Breath sounds: Normal breath sounds. No wheezing or rales. Abdominal:      Palpations: Abdomen is soft. There is no mass. Tenderness: There is no abdominal tenderness. There is no guarding. Musculoskeletal:         General: No tenderness. Normal range of motion. Right lower leg: No edema. Left lower leg: No edema. Lymphadenopathy:      Lower Body: No right inguinal adenopathy. No left inguinal adenopathy. Neurological:      Mental Status: She is alert and oriented to person, place, and time.    Skin: General: Skin is warm and dry. Coloration: Skin is not pale. Findings: No erythema. Psychiatric:         Mood and Affect: Mood normal.         Behavior: Behavior normal.         Thought Content: Thought content normal.         Judgment: Judgment normal.   Vitals and nursing note reviewed.

## 2023-09-27 LAB
LAB AP GYN PRIMARY INTERPRETATION: NORMAL
Lab: NORMAL

## 2023-12-03 ENCOUNTER — OFFICE VISIT (OUTPATIENT)
Dept: URGENT CARE | Age: 23
End: 2023-12-03
Payer: COMMERCIAL

## 2023-12-03 VITALS
TEMPERATURE: 97.9 F | OXYGEN SATURATION: 97 % | HEART RATE: 65 BPM | DIASTOLIC BLOOD PRESSURE: 70 MMHG | SYSTOLIC BLOOD PRESSURE: 120 MMHG | RESPIRATION RATE: 20 BRPM

## 2023-12-03 DIAGNOSIS — R09.81 CONGESTION OF NASAL SINUS: Primary | ICD-10-CM

## 2023-12-03 DIAGNOSIS — H66.91 RIGHT OTITIS MEDIA, UNSPECIFIED OTITIS MEDIA TYPE: ICD-10-CM

## 2023-12-03 LAB
SARS-COV-2 AG UPPER RESP QL IA: NEGATIVE
VALID CONTROL: NORMAL

## 2023-12-03 PROCEDURE — 99213 OFFICE O/P EST LOW 20 MIN: CPT | Performed by: NURSE PRACTITIONER

## 2023-12-03 PROCEDURE — 87811 SARS-COV-2 COVID19 W/OPTIC: CPT | Performed by: NURSE PRACTITIONER

## 2023-12-03 RX ORDER — AMOXICILLIN 875 MG/1
875 TABLET, COATED ORAL 2 TIMES DAILY
Qty: 14 TABLET | Refills: 0 | Status: SHIPPED | OUTPATIENT
Start: 2023-12-03 | End: 2023-12-10

## 2023-12-03 RX ORDER — AMOXICILLIN 875 MG/1
875 TABLET, COATED ORAL 2 TIMES DAILY
Qty: 14 TABLET | Refills: 0 | Status: SHIPPED | OUTPATIENT
Start: 2023-12-03 | End: 2023-12-03 | Stop reason: SDUPTHER

## 2023-12-03 NOTE — PROGRESS NOTES
[facility]        NAME: Kenton Kraft is a 24 y.o. female  : 2000    MRN: [id number]  DATE: December 3, 2023  TIME: 4:57 PM    Assessment and Plan   Congestion of nasal sinus [R09.81]  1. Congestion of nasal sinus  Poct Covid 19 Rapid Antigen Test      2. Right otitis media, unspecified otitis media type  amoxicillin (AMOXIL) 875 mg tablet    DISCONTINUED: amoxicillin (AMOXIL) 875 mg tablet            Patient Instructions     Take medication as prescribed  Amox resent to [facility] per patient request  Follow up with PCP in 3-5 days. Proceed to  ER if symptoms worsen. Chief Complaint     Chief Complaint   Patient presents with    Nasal Congestion     Started with symptoms Tuesday. B/l right more than left. Ear pain started yesterday. Alkeseltzer cold and flu not helping last dose yesterday     Earache         History of Present Illness       HPI  Presents to clinic with complaint of cold symptoms about 3 to 4 days. Started having ear pain today. Right greater than left. No loss of hearing. Also some warm flashes. Review of Systems   Review of Systems   Constitutional:  Positive for chills and fever. HENT:  Positive for congestion, ear pain, rhinorrhea and sinus pressure. Respiratory:  Positive for cough. Negative for chest tightness, shortness of breath and wheezing. Cardiovascular:  Negative for chest pain. Gastrointestinal:  Negative for diarrhea and vomiting. Neurological:  Negative for light-headedness. Current Medications       Current Outpatient Medications:     amoxicillin (AMOXIL) 875 mg tablet, Take 1 tablet (875 mg total) by mouth 2 (two) times a day for 7 days, Disp: 14 tablet, Rfl: 0    ibuprofen (MOTRIN) 200 mg tablet, Take 2 tablets (400 mg total) by mouth every 6 (six) hours as needed for mild pain Take with food. , Disp: , Rfl: 0    norethindrone-ethinyl estradiol (Junel FE 1/20) 1-20 MG-MCG per tablet, Take 1 tablet by mouth daily, Disp: 84 tablet, Rfl: 4 triamcinolone (KENALOG) 0.1 % ointment, Apply topically 2 (two) times a day, Disp: 30 g, Rfl: 3    Current Allergies     Allergies as of 2023    (No Known Allergies)            The following portions of the patient's history were reviewed and updated as appropriate: allergies, current medications, past family history, past medical history, past social history, past surgical history and problem list.     Past Medical History:   Diagnosis Date    Allergic rhinitis     Eczema     Kidney stone      (spontaneous vaginal delivery) 2022    Varicella     immunized       Past Surgical History:   Procedure Laterality Date    APPENDECTOMY LAPAROSCOPIC N/A 2022    Procedure: APPENDECTOMY LAPAROSCOPIC, possible open;  Surgeon: Jovi Dye DO;  Location[facility];  Service: General    WISDOM TOOTH EXTRACTION         Family History   Problem Relation Age of Onset    No Known Problems Mother     No Known Problems Father     No Known Problems Brother     No Known Problems Brother     Mental illness Neg Hx     Substance Abuse Neg Hx          Medications have been verified. Objective   /70   Pulse 65   Temp 97.9 °F (36.6 °C) (Temporal)   Resp 20   SpO2 97%   No LMP recorded. (Menstrual status: Birth Control). Physical Exam     Physical Exam  Constitutional:       Appearance: She is not ill-appearing or diaphoretic. HENT:      Right Ear: Tympanic membrane is erythematous and bulging. Left Ear: Tympanic membrane normal.      Nose: Rhinorrhea present. Mouth/Throat:      Pharynx: No posterior oropharyngeal erythema. Cardiovascular:      Rate and Rhythm: Regular rhythm. Heart sounds: Normal heart sounds. Pulmonary:      Effort: Pulmonary effort is normal.      Breath sounds: No wheezing. Comments: Congestion in the lungs  Lymphadenopathy:      Cervical: No cervical adenopathy.

## 2024-02-06 ENCOUNTER — TELEPHONE (OUTPATIENT)
Age: 24
End: 2024-02-06

## 2024-02-06 DIAGNOSIS — R35.0 URINARY FREQUENCY: Primary | ICD-10-CM

## 2024-02-06 DIAGNOSIS — R10.9 FLANK PAIN: ICD-10-CM

## 2024-02-06 NOTE — TELEPHONE ENCOUNTER
Patient is aware to have urine and ultrasound performed prior to her upcoming scheduled appointment with [facility] in [place].

## 2024-02-06 NOTE — TELEPHONE ENCOUNTER
Lower left side back pain. Increased urinary frequency. Does not witness any noticeable blood in urine. No difficulty passing urine and no abdominal pain. Pain in the back does radiate to the front abdominal area. She was last seen in 2019. She was given care advice and reviewed ER precautions. She is aware we will seek feedback from the provider for further recommendations or imaging possibilities.

## 2024-02-07 NOTE — TELEPHONE ENCOUNTER
Schedule review for Yamile GAMBLE. He is overbooked for this day. Patient has not yet scheduled her US. Appt cancelled.    Please assist in scheduling that and then rescheduling new patient appt for 2 weeks after US is scheduled.

## 2024-02-07 NOTE — TELEPHONE ENCOUNTER
Called the patient and left a VM   The upcoming appointment with Urology has been cancelled since the US. has not been completed, please call [phone].     Once that has been completed please call us to schedule your appointment, thank-you.    Please schedule new patient appt for 2 weeks after US is scheduled, thank-you

## 2024-02-09 ENCOUNTER — APPOINTMENT (OUTPATIENT)
Dept: LAB | Facility: CLINIC | Age: 24
End: 2024-02-09
Payer: COMMERCIAL

## 2024-02-09 DIAGNOSIS — R35.0 URINARY FREQUENCY: ICD-10-CM

## 2024-02-09 LAB
BACTERIA UR QL AUTO: ABNORMAL /HPF
BILIRUB UR QL STRIP: NEGATIVE
CLARITY UR: ABNORMAL
COLOR UR: ABNORMAL
GLUCOSE UR STRIP-MCNC: NEGATIVE MG/DL
HGB UR QL STRIP.AUTO: NEGATIVE
KETONES UR STRIP-MCNC: NEGATIVE MG/DL
LEUKOCYTE ESTERASE UR QL STRIP: ABNORMAL
MUCOUS THREADS UR QL AUTO: ABNORMAL
NITRITE UR QL STRIP: NEGATIVE
NON-SQ EPI CELLS URNS QL MICRO: ABNORMAL /HPF
PH UR STRIP.AUTO: 7 [PH]
PROT UR STRIP-MCNC: ABNORMAL MG/DL
RBC #/AREA URNS AUTO: ABNORMAL /HPF
SP GR UR STRIP.AUTO: 1.02 (ref 1–1.03)
UROBILINOGEN UR STRIP-ACNC: <2 MG/DL
WBC #/AREA URNS AUTO: ABNORMAL /HPF

## 2024-02-09 PROCEDURE — 87186 SC STD MICRODIL/AGAR DIL: CPT

## 2024-02-09 PROCEDURE — 87086 URINE CULTURE/COLONY COUNT: CPT

## 2024-02-09 PROCEDURE — 81001 URINALYSIS AUTO W/SCOPE: CPT

## 2024-02-09 PROCEDURE — 87147 CULTURE TYPE IMMUNOLOGIC: CPT

## 2024-02-11 LAB — BACTERIA UR CULT: ABNORMAL

## 2024-02-12 ENCOUNTER — TELEPHONE (OUTPATIENT)
Dept: UROLOGY | Facility: AMBULATORY SURGERY CENTER | Age: 24
End: 2024-02-12

## 2024-02-12 DIAGNOSIS — N39.0 URINARY TRACT INFECTION WITHOUT HEMATURIA, SITE UNSPECIFIED: Primary | ICD-10-CM

## 2024-02-12 RX ORDER — NITROFURANTOIN 25; 75 MG/1; MG/1
100 CAPSULE ORAL 2 TIMES DAILY
Qty: 14 CAPSULE | Refills: 0 | Status: SHIPPED | OUTPATIENT
Start: 2024-02-12 | End: 2024-02-19

## 2024-02-12 NOTE — TELEPHONE ENCOUNTER
Called patient and informed her that her urine culture demonstrates growth of bacteria. I also explained that domonique sent a 7-day course of treatment of Macrobid patient is aware and will pick it up at her pharmacy.               ----- Message from Domonique Ovalle PA-C sent at 2/12/2024  8:59 AM EST -----  Please call patient to inform her that urine cultures demonstrate growth of bacteria.  Will treat with 7-day course of Macrobid.

## 2024-02-12 NOTE — TELEPHONE ENCOUNTER
Seeing that US is scheduled and not finding an appt in the 2 weeks for results. Please advise of appt or wondering if this pt can have a phone call for results. Thank you.

## 2024-02-12 NOTE — TELEPHONE ENCOUNTER
Socorro Wen MRN: [id number]    Date: 2/15/2024 Status: Brandy   Time: 2:30 PM Length: 30   Visit Type: US KIDNEY AND BLADDER [[id number]] Copay: $0.00   Provider: ARIA LARSEN 2 Department: [facility]   Bill Area:  Department Phone: [phone]   Referral Number: [id number] Referral Status: Authorized   Notes: N/R-US--VJ  script in epic-   Rescheduled: 2/9/2024 1:35 PM By: SACHIN ROMAN [[id number]] (ES)     US has been scheduled. Looking for follow up appt at least 2 weeks after (2/29/24)

## 2024-02-13 ENCOUNTER — HOSPITAL ENCOUNTER (OUTPATIENT)
Dept: RADIOLOGY | Facility: HOSPITAL | Age: 24
Discharge: HOME | End: 2024-02-13
Payer: COMMERCIAL

## 2024-02-13 DIAGNOSIS — R10.9 FLANK PAIN: ICD-10-CM

## 2024-02-13 PROCEDURE — 76775 US EXAM ABDO BACK WALL LIM: CPT

## 2024-02-21 ENCOUNTER — TELEPHONE (OUTPATIENT)
Dept: OTHER | Facility: HOSPITAL | Age: 24
End: 2024-02-21

## 2024-02-28 ENCOUNTER — TELEPHONE (OUTPATIENT)
Dept: DERMATOLOGY | Facility: CLINIC | Age: 24
End: 2024-02-28

## 2024-02-28 NOTE — TELEPHONE ENCOUNTER
Left patient a message to please give us a call back. She was double booked for [facility] on 3/4 and we need to move her appointment.     We do have open appointments in [place] if she is willing to travel.

## 2024-03-06 ENCOUNTER — OFFICE VISIT (OUTPATIENT)
Dept: DERMATOLOGY | Facility: CLINIC | Age: 24
End: 2024-03-06
Payer: COMMERCIAL

## 2024-03-06 VITALS — TEMPERATURE: 98.1 F | BODY MASS INDEX: 29.87 KG/M2 | WEIGHT: 174 LBS

## 2024-03-06 DIAGNOSIS — D48.5 NEOPLASM OF UNCERTAIN BEHAVIOR OF SKIN: Primary | ICD-10-CM

## 2024-03-06 DIAGNOSIS — D22.72 MULTIPLE BENIGN MELANOCYTIC NEVI OF UPPER AND LOWER EXTREMITIES AND TRUNK: ICD-10-CM

## 2024-03-06 DIAGNOSIS — D22.5 MULTIPLE BENIGN MELANOCYTIC NEVI OF UPPER AND LOWER EXTREMITIES AND TRUNK: ICD-10-CM

## 2024-03-06 DIAGNOSIS — D22.61 MULTIPLE BENIGN MELANOCYTIC NEVI OF UPPER AND LOWER EXTREMITIES AND TRUNK: ICD-10-CM

## 2024-03-06 DIAGNOSIS — D22.71 MULTIPLE BENIGN MELANOCYTIC NEVI OF UPPER AND LOWER EXTREMITIES AND TRUNK: ICD-10-CM

## 2024-03-06 DIAGNOSIS — D22.62 MULTIPLE BENIGN MELANOCYTIC NEVI OF UPPER AND LOWER EXTREMITIES AND TRUNK: ICD-10-CM

## 2024-03-06 PROCEDURE — 99213 OFFICE O/P EST LOW 20 MIN: CPT

## 2024-03-06 NOTE — PATIENT INSTRUCTIONS
"MELANOCYTIC NEVI (\"Moles\")    Assessment and Plan:  Based on a thorough discussion of this condition and the management approach to it (including a comprehensive discussion of the known risks, side effects and potential benefits of treatment), the patient (family) agrees to implement the following specific plan:  Reassured benign     Melanocytic Nevi  Melanocytic nevi (\"moles\") are tan or brown, raised or flat areas of the skin which have an increased number of melanocytes. Melanocytes are the cells in our body which make pigment and account for skin color.    Some moles are present at birth (I.e., \"congenital nevi\"), while others come up later in life (i.e., \"acquired nevi\").  The sun can stimulate the body to make more moles.  Sunburns are not the only thing that triggers more moles.  Chronic sun exposure can do it too.     Clinically distinguishing a healthy mole from melanoma may be difficult, even for experienced dermatologists. The \"ABCDE's\" of moles have been suggested as a means of helping to alert a person to a suspicious mole and the possible increased risk of melanoma.  The suggestions for raising alert are as follows:    Asymmetry: Healthy moles tend to be symmetric, while melanomas are often asymmetric.  Asymmetry means if you draw a line through the mole, the two halves do not match in color, size, shape, or surface texture. Asymmetry can be a result of rapid enlargement of a mole, the development of a raised area on a previously flat lesion, scaling, ulceration, bleeding or scabbing within the mole.  Any mole that starts to demonstrate \"asymmetry\" should be examined promptly by a board certified dermatologist.     Border: Healthy moles tend to have discrete, even borders.  The border of a melanoma often blends into the normal skin and does not sharply delineate the mole from normal skin.  Any mole that starts to demonstrate \"uneven borders\" should be examined promptly by a board certified " "dermatologist.     Color: Healthy moles tend to be one color throughout.  Melanomas tend to be made up of different colors ranging from dark black, blue, white, or red.  Any mole that demonstrates a color change should be examined promptly by a board certified dermatologist.     Diameter: Healthy moles tend to be smaller than 0.6 cm in size; an exception are \"congenital nevi\" that can be larger.  Melanomas tend to grow and can often be greater than 0.6 cm (1/4 of an inch, or the size of a pencil eraser). This is only a guideline, and many normal moles may be larger than 0.6 cm without being unhealthy.  Any mole that starts to change in size (small to bigger or bigger to smaller) should be examined promptly by a board certified dermatologist.     Evolving: Healthy moles tend to \"stay the same.\"  Melanomas may often show signs of change or evolution such as a change in size, shape, color, or elevation.  Any mole that starts to itch, bleed, crust, burn, hurt, or ulcerate or demonstrate a change or evolution should be examined promptly by a board certified dermatologist.      Dysplastic Nevi  Dysplastic moles are moles that fit the ABCDE rules of melanoma but are not identified as melanomas when examined under the microscope.  They may indicate an increased risk of melanoma in that person. If there is a family history of melanoma, most experts agree that the person may be at an increased risk for developing a melanoma.  Experts still do not agree on what dysplastic moles mean in patients without a personal or family history of melanoma.  Dysplastic moles are usually larger than common moles and have different colors within it with irregular borders. The appearance can be very similar to a melanoma. Biopsies of dysplastic moles may show abnormalities which are different from a regular mole.      Melanoma  Malignant melanoma is a type of skin cancer that can be deadly if it spreads throughout the body. The incidence of " "melanoma in the [place] is growing faster than any other cancer. Melanoma usually grows near the surface of the skin for a period of time, and then begins to grow deeper into the skin. Once it grows deeper into the skin, the risk of spread to other organs greatly increases. Therefore, early detection and removal of a malignant melanoma may result in a better chance at a complete cure; removal after the tumor has spread may not be as effective, leading to worse clinical outcomes such as death.    The true rate of nevus transformation into a melanoma is unknown. It has been estimated that the lifetime risk for any acquired melanocytic nevus on any 20-year-old individual transforming into melanoma by age 80 is 0.03% (1 in 3,164) for men and 0.009% (1 in 10,800) for women.     The appearance of a \"new mole\" remains one of the most reliable methods for identifying a malignant melanoma.  Occasionally, melanomas appear as rapidly growing, blue-black, dome-shaped bumps within a previous mole or previous area of normal skin.  Other times, melanomas are suspected when a mole suddenly appears or changes. Itching, burning, or pain in a pigmented lesion should increase suspicion, but most patients with early melanoma have no skin discomfort whatsoever.  Melanoma can occur anywhere on the skin, including areas that are difficult for self-examination. Many melanomas are first noticed by other family members.  Suspicious-looking moles may be removed for microscopic examination.       You may be able to prevent death from melanoma by doing two simple things:    Try to avoid unnecessary sun exposure and protect your skin when it is exposed to the sun.  People who live near the equator, people who have intermittent exposures to large amounts of sun, and people who have had sunburns in childhood or adolescence have an increased risk for melanoma. Sun sense and vigilant sun protection may be keys to helping to prevent melanoma.  " "We recommend wearing UPF-rated sun protective clothing and sunglasses whenever possible and applying a moisturizer-sunscreen combination product (SPF 50+) such as Neutrogena Daily Defense to sun exposed areas of skin at least three times a day.    Have your moles regularly examined by a board certified dermatologist AND by yourself or a family member/friend at home.  We recommend that you have your moles examined at least once a year by a board certified dermatologist.  Use your birthday as an annual reminder to have your \"Birthday Suit\" (I.e., your skin) examined; it is a nice birthday gift to yourself to know that your skin is healthy appearing!  Additionally, at-home self examinations may be helpful for detecting a possible melanoma.  Use the ABCDEs we discussed and check your moles once a month at home.      NEOPLASM OF UNCERTAIN BEHAVIOR OF SKIN    Assessment and Plan:  I have discussed with the patient that a sample of skin via a \"skin biopsy” would be potentially helpful to further make a specific diagnosis under the microscope.  Based on a thorough discussion of this condition and the management approach to it (including a comprehensive discussion of the known risks, side effects and potential benefits of treatment), the patient (family) agrees to implement the following specific plan:    Procedure:  Plan for  Excisional Skin Biopsy.  Scheduled on 4/17/24 with Dr. Wu @ [facility] @ 9;10 am  Follow up for skin exam      "

## 2024-03-06 NOTE — PROGRESS NOTES
"[facility] Dermatology Clinic Note     Patient Name: Socorro Wen  Encounter Date: 3/6/24     Have you been cared for by a [facility] Dermatologist in the last 3 years and, if so, which description applies to you?    Yes.  I have been here within the last 3 years, and my medical history has NOT changed since that time.  I am FEMALE/of child-bearing potential.    REVIEW OF SYSTEMS:  Have you recently had or currently have any of the following? No changes in my recent health.   PAST MEDICAL HISTORY:  Have you personally ever had or currently have any of the following?  If \"YES,\" then please provide more detail. No changes in my medical history.   HISTORY OF IMMUNOSUPPRESSION: Do you have a history of any of the following:  Systemic Immunosuppression such as Diabetes, Biologic or Immunotherapy, Chemotherapy, Organ Transplantation, Bone Marrow Transplantation?  No     Answering \"YES\" requires the addition of the dotphrase \"IMMUNOSUPPRESSED\" as the first diagnosis of the patient's visit.   FAMILY HISTORY:  Any \"first degree relatives\" (parent, brother, sister, or child) with the following?    No changes in my family's known health.   PATIENT EXPERIENCE:    Do you want the Dermatologist to perform a COMPLETE skin exam today including a clinical examination under the \"bra and underwear\" areas?  NO  If necessary, do we have your permission to call and leave a detailed message on your Preferred Phone number that includes your specific medical information?  Yes      No Known Allergies   Current Outpatient Medications:     norethindrone-ethinyl estradiol (Junel FE 1/20) 1-20 MG-MCG per tablet, Take 1 tablet by mouth daily, Disp: 84 tablet, Rfl: 4    triamcinolone (KENALOG) 0.1 % ointment, Apply topically 2 (two) times a day, Disp: 30 g, Rfl: 3    ibuprofen (MOTRIN) 200 mg tablet, Take 2 tablets (400 mg total) by mouth every 6 (six) hours as needed for mild pain Take with food. (Patient not taking: Reported on 3/6/2024), " "Disp: , Rfl: 0          Whom besides the patient is providing clinical information about today's encounter?   NO ADDITIONAL HISTORIAN (patient alone provided history)    Physical Exam and Assessment/Plan by Diagnosis:      NEOPLASM OF UNCERTAIN BEHAVIOR OF SKIN    Physical Exam:  (Anatomic Location); (Size and Morphological Description); (Differential Diagnosis):  Right upper breast; 0.8 x 0.8 cm  irregularly pigmented papule. Differential diagnosis: benign vs atypical nevus; rule out melanoma          Additional History of Present Condition: Patient presents with a spot of concern on the right breast. She has had this for years. She denies any changes but states she thinks it may have been darker. She denies any pain/tenderness or ulceration. She states her mother was recently diagnoses with melanoma and she wanted to have her mole checked. She deferred a full skin exam today.  She will schedule a full skin exam at a later time.     Assessment and Plan:  I have discussed with the patient that a sample of skin via a \"skin biopsy” would be potentially helpful to further make a specific diagnosis under the microscope.  Based on a thorough discussion of this condition and the management approach to it (including a comprehensive discussion of the known risks, side effects and potential benefits of treatment), the patient (family) agrees to implement the following specific plan:    Procedure:  Plan for  Excisional Skin Biopsy.  Scheduled on 4/17/24 with Dr. Wu @ [facility] @ 9;10 am  Follow up for skin exam.    MELANOCYTIC NEVI (\"Moles\")    Physical Exam:  Anatomic Location Affected: trunk  Morphological Description:  Scattered, 1-4mm round to ovoid, symmetrical-appearing, even bordered, skin colored to dark brown macules/papules, mostly in sun-exposed areas    Additional History of Present Condition:  Present on exam. Denies any pain, itch, bleeding. Present for years. Denies actively changing or growing moles. "     Assessment and Plan:  Based on a thorough discussion of this condition and the management approach to it (including a comprehensive discussion of the known risks, side effects and potential benefits of treatment), the patient (family) agrees to implement the following specific plan:  Monitor for changes. ABCDE's of melanoma handout provided.   Practice sun protection. Apply broad spectrum (UVA and UVB) sunscreen, SPF 30 or higher every 2 hours. Wear sun protective clothing, hats, and sunglasses.       Scribe Attestation      I,:  Gia Waller am acting as a scribe while in the presence of the attending physician.:       I,:  Treasure Sylvester PA-C personally performed the services described in this documentation    as scribed in my presence.:

## 2024-04-02 ENCOUNTER — OFFICE VISIT (OUTPATIENT)
Dept: URGENT CARE | Facility: CLINIC | Age: 24
End: 2024-04-02
Payer: COMMERCIAL

## 2024-04-02 VITALS
TEMPERATURE: 98.3 F | SYSTOLIC BLOOD PRESSURE: 106 MMHG | DIASTOLIC BLOOD PRESSURE: 82 MMHG | OXYGEN SATURATION: 97 % | HEART RATE: 84 BPM | RESPIRATION RATE: 18 BRPM

## 2024-04-02 DIAGNOSIS — H65.92 MIDDLE EAR EFFUSION, LEFT: ICD-10-CM

## 2024-04-02 DIAGNOSIS — H65.192 OTHER ACUTE NONSUPPURATIVE OTITIS MEDIA OF LEFT EAR, RECURRENCE NOT SPECIFIED: Primary | ICD-10-CM

## 2024-04-02 PROCEDURE — 99213 OFFICE O/P EST LOW 20 MIN: CPT

## 2024-04-02 RX ORDER — AZITHROMYCIN 250 MG/1
TABLET, FILM COATED ORAL
Qty: 6 TABLET | Refills: 0 | Status: SHIPPED | OUTPATIENT
Start: 2024-04-02 | End: 2024-04-06

## 2024-04-02 RX ORDER — FLUTICASONE PROPIONATE 50 MCG
1 SPRAY, SUSPENSION (ML) NASAL DAILY
Qty: 9.9 ML | Refills: 0 | Status: SHIPPED | OUTPATIENT
Start: 2024-04-02

## 2024-04-02 NOTE — PROGRESS NOTES
[facility]        NAME: Socorro Wen is a 23 y.o. female  : 2000    MRN: [id number]  DATE: 2024  TIME: 3:05 PM    Assessment and Plan   Other acute nonsuppurative otitis media of left ear, recurrence not specified [H65.192]  1. Other acute nonsuppurative otitis media of left ear, recurrence not specified  azithromycin (ZITHROMAX) 250 mg tablet      2. Middle ear effusion, left  fluticasone (FLONASE) 50 mcg/act nasal spray            Patient Instructions       Follow up with PCP in 3-5 days.  Proceed to  ER if symptoms worsen.    If tests have been performed at [facility], our office will contact you with results if changes need to be made to the care plan discussed with you at the visit.  [facility]    Chief Complaint     Chief Complaint   Patient presents with    Earache     Patient has left ear pain that started this morning upon waking up          History of Present Illness       22 y/o F presents for L ear pain x 1 day. Pt admits she woke up with throbbing ear pain. No URI symptoms. No use of OTC meds. Denies fevers.         Review of Systems   Review of Systems   Constitutional:  Negative for chills and fever.   HENT:  Positive for ear pain. Negative for congestion, ear discharge, rhinorrhea and sore throat.    Respiratory:  Negative for cough.    Neurological:  Negative for headaches.         Current Medications       Current Outpatient Medications:     azithromycin (ZITHROMAX) 250 mg tablet, Take 2 tablets today then 1 tablet daily x 4 days, Disp: 6 tablet, Rfl: 0    fluticasone (FLONASE) 50 mcg/act nasal spray, 1 spray into each nostril daily, Disp: 9.9 mL, Rfl: 0    ibuprofen (MOTRIN) 200 mg tablet, Take 2 tablets (400 mg total) by mouth every 6 (six) hours as needed for mild pain Take with food. (Patient not taking: Reported on 3/6/2024), Disp: , Rfl: 0    norethindrone-ethinyl estradiol (Junel FE 1/20) 1-20 MG-MCG per tablet, Take 1  tablet by mouth daily, Disp: 84 tablet, Rfl: 4    triamcinolone (KENALOG) 0.1 % ointment, Apply topically 2 (two) times a day, Disp: 30 g, Rfl: 3    Current Allergies     Allergies as of 2024    (No Known Allergies)            The following portions of the patient's history were reviewed and updated as appropriate: allergies, current medications, past family history, past medical history, past social history, past surgical history and problem list.     Past Medical History:   Diagnosis Date    Allergic rhinitis     Eczema     Kidney stone      (spontaneous vaginal delivery) 2022    Varicella     immunized       Past Surgical History:   Procedure Laterality Date    APPENDECTOMY LAPAROSCOPIC N/A 2022    Procedure: APPENDECTOMY LAPAROSCOPIC, possible open;  Surgeon: Lauryn Ullrich, DO;  Location: [facility];  Service: General    WISDOM TOOTH EXTRACTION         Family History   Problem Relation Age of Onset    Melanoma Mother     No Known Problems Father     No Known Problems Brother     No Known Problems Brother     Mental illness Neg Hx     Substance Abuse Neg Hx          Medications have been verified.        Objective   /82   Pulse 84   Temp 98.3 °F (36.8 °C)   Resp 18   SpO2 97%   No LMP recorded.       Physical Exam     Physical Exam  Vitals and nursing note reviewed.   Constitutional:       General: She is not in acute distress.     Appearance: She is not toxic-appearing.   HENT:      Head: Normocephalic and atraumatic.      Right Ear: Tympanic membrane, ear canal and external ear normal.      Left Ear: Ear canal and external ear normal.      Ears:      Comments: L TM erythematous      Nose: Nose normal.      Mouth/Throat:      Mouth: Mucous membranes are moist.      Pharynx: No oropharyngeal exudate or posterior oropharyngeal erythema.   Eyes:      Conjunctiva/sclera: Conjunctivae normal.   Pulmonary:      Effort: Pulmonary effort is normal.   Neurological:      Mental Status: She is  alert.   Psychiatric:         Mood and Affect: Mood normal.         Behavior: Behavior normal.

## 2024-04-17 ENCOUNTER — PROCEDURE VISIT (OUTPATIENT)
Dept: DERMATOLOGY | Facility: CLINIC | Age: 24
End: 2024-04-17
Payer: COMMERCIAL

## 2024-04-17 VITALS — TEMPERATURE: 96.7 F | WEIGHT: 176 LBS | BODY MASS INDEX: 30.21 KG/M2

## 2024-04-17 DIAGNOSIS — D48.5 NEOPLASM OF UNCERTAIN BEHAVIOR OF SKIN: Primary | ICD-10-CM

## 2024-04-17 PROCEDURE — 88305 TISSUE EXAM BY PATHOLOGIST: CPT | Performed by: STUDENT IN AN ORGANIZED HEALTH CARE EDUCATION/TRAINING PROGRAM

## 2024-04-17 PROCEDURE — 88342 IMHCHEM/IMCYTCHM 1ST ANTB: CPT | Performed by: STUDENT IN AN ORGANIZED HEALTH CARE EDUCATION/TRAINING PROGRAM

## 2024-04-17 PROCEDURE — 11401 EXC TR-EXT B9+MARG 0.6-1 CM: CPT | Performed by: DERMATOLOGY

## 2024-04-17 PROCEDURE — 88341 IMHCHEM/IMCYTCHM EA ADD ANTB: CPT | Performed by: STUDENT IN AN ORGANIZED HEALTH CARE EDUCATION/TRAINING PROGRAM

## 2024-04-17 PROCEDURE — 12032 INTMD RPR S/A/T/EXT 2.6-7.5: CPT | Performed by: DERMATOLOGY

## 2024-04-17 NOTE — PROGRESS NOTES
PROCEDURE:  EXCISION BIOPSY WITH INTERMEDIATE LAYERED CLOSURE     Attending: Marilyn Sharp  Assistant: Reji Jo MD, resident physician,  Gia Waller    Pre-Op Diagnosis: Dyplastic nevus  Post-Op Diagnosis: Same   Benign versus Malignant Benign      Lesion Anatomic Location: right breast   Pre-op size: 0.9 x cm   Size of defect:  0.9 cm  (with 0.2 mm margins)  Final repaired wound length:  4.6 cm            Written and verbal, witnessed informed consent was obtained. I discussed that excision is a method of removing lesions both benign and malignant lesions.  A portion of normal skin is often taken to ensure completeness of removal.  I reviewed that procedure will include numbing the area,  cutting around and under defect, undermining tissue, and closing the wound with sutures both inside and out.  These sutures are usually removed in 7 to 14 days. Risks (bleeding, pain, infection, scarring, recurrence) and benefits discussed. It was discussed with patient that every effort is made to minimize scar, but scarring is influenced also by extrinsic factor such as location, age and genetics.     Time Out: performed:  yes  Correct patient: yes.   Correct site per Clinic Report: yes  Correct site per Patient Report: yes    LOCAL ANESTHESIA: 3:1 1% xylocaine with epi and 1-100,000 buffered    DESCRIPTION OF PROCEDURE: The patient was brought back into the procedure room, anesthetized locally, prepped and draped in the usual fashion. Using a #15 blade with a scalpel, the lesion was excised in elliptical fashion. The wound was  undermined in the  fascial plane. Hemostasis was achieved with light electrocoagulation. Purpose of undermining was to decrease wound tension and facilitate closure.    The wound was closed with subcutaneous sutures as follows:    Deep suture:4-0 Vicryl      Epidermal edge closure was accomplished with superficial sutures as follows:    Superficial suture: 4-0 Prolene  Superficial suture type:  simple interrupted    Estimated blood loss less than 3ml.    The patient tolerated the procedure well without any complications. The wound was cleaned with sterile saline, dried off, surgical vaseline ointment was applied, and the wound was covered. A pressure dressing was applied for stabilization and light pressure. The patient was given detailed oral and written instructions on postoperative care as detailed in consent. The patient left in good medical condition.    POSTOP DISCUSSION DISCUSSION AND INSTRUCTIONS FOR PATIENT      Rationale for Procedure  A skin excision allows the dermatologist to remove a lesion. The procedure involves a local numbing medication and removing the entire lesion. Typically, the lesion is being removed because it is atypical, traumatized, or for significant appearance reasons.  The area will be open like a brush burn and allowed to heal.   There will be no sutures.Tissue is sent to pathologist who will reconfirm diagnosis and verify completeness of lesion removal.    Description of Procedure  We would like to perform a skin excision today.  A local anesthetic, similar to the kind that a dentist uses when filling a cavity, will be injected with a very small needle into the skin area to be sampled.  The injected skin and tissue underneath should “go to sleep” and become numbed so that no further pain should be felt.  A scalpel will be used to cut around the lesion and tissue will be submitted to pathology for examination.  Depending on the diagnosis the lesion will be excised with a certain amount of normal skin to help assure completeness of lesion removal.  The physician will discuss in advance the anticipated size and extent of removal.   Bleeding will occur, but it will stopped with direct pressure, sutures, and electrocautery.    Surgical “Vaseline-type” ointment will also applied after the procedure to help create a barrier between the wound and the outside world.      Risks and  Potential Complications  The advantage of a skin excision is that it allows us to remove a problem lesion quickly.  Although this usually permits the lesion to heal as soon as possible with the least scarring, there are some risks and potential complications that include but are not limited to the following:  Some bleeding is normal at time of procedure and some bleeding on gauze is normal  the first few days after surgery.  Profuse bleeding and bleeding with swelling and pain should be reported as detailed  below  Infection is uncommon in skin surgery.  Infection should be reported and is indicated by pain, redness, and discharge of purulent material.  Some dull to at time sharp pain could occur initially the day after surgery.  Persistent pain or increasing pain days after surgery is not expected and should be reported.  Every effort is made to minimize scar, but location, size, and genetics do play a role in scar appearance.  A surgical wound does not achieve its optimal appearance until 6 months.  There are several treatments available if scarring would be problematic including scar creams, silicone pad, laser and scar revision.  Skin discoloration can occur especially in people of color.  Its important to avoid sun on wound in first 6 months after surgery.  Treatment is available if pigment is problematic.  Incomplete removal of the lesion or recurrence of lesion can occur and this would then require further treatment and more surgery.  Nerve Damage/Numbness/Loss of Function is very rare, but is most likely to occur if lesion is large or if it is in a high risk location  Allergic Reaction to lidocaine is rare.  More commonly,  epinephrine is used  with the lidocaine.  Occasionally, epinephrine (aka adrenalin) may cause a brief  feeling of anxiety or jitteriness.  The person at the microscope  (pathologist) may provide additional information that was unexpected. This unexpected finding could provoke the need for  additional treatment or evaluation.    What You Will Need to Do After the Procedure  Keep the area clean and dry the first day. Try NOT to remove the bandage for the first day.  Gently clean the area with soap and water and apply Vaseline ointment (this is over the counter and not a prescription) to the excision  site for up to 2 weeks.    Apply a clean appropriately sized bandage to area.  Gauze and paper tape are recommended for sensitive skin.  Return for suture removal as instructed (generally 1 week for the face, 2 weeks for the body).  Take Acetaminophen (Tylenol) for discomfort, if no contraindications.  Do NOT take Ibuprofen or aspirin unless specifically told to do so by your Dermatologist because these medications can make bleeding worse.  Call our office immediately for signs of infection: fever, chills, increased redness, warmth, tenderness, discomfort/pain, or pus or foul smell coming from the wound.    If bleeding is noticed, place a clean cloth over the area and apply firm pressure for thirty minutes.  Check the wound ONLY after 30 minutes of direct pressure; do not cheat and sneak a peak, as that does not count.  If bleeding persists after 30 minutes of legitimate direct pressure, then try one more round of direct pressure for an additional 10 minutes to the area.  Should the bleeding become heavier or not stop after the second attempt, call [facility] directly at [phone] (SKIN) or, if after hours, go to your local Emergency Room/Emergency Department.     Marilyn Gabriel MD- Dermatology PGY2    Scribe Attestation    I,:  Gia Waller am acting as a scribe while in the presence of the attending physician.:       I,:  Marilyn Gabriel MD personally performed the services described in this documentation    as scribed in my presence.:

## 2024-04-17 NOTE — PATIENT INSTRUCTIONS
POSTOP DISCUSSION DISCUSSION AND INSTRUCTIONS FOR PATIENT      Rationale for Procedure  A skin excision allows the dermatologist to remove a lesion. The procedure involves a local numbing medication and removing the entire lesion. Typically, the lesion is being removed because it is atypical, traumatized, or for significant appearance reasons.  The area will be open like a brush burn and allowed to heal.   There will be no sutures.Tissue is sent to pathologist who will reconfirm diagnosis and verify completeness of lesion removal.    Description of Procedure  We would like to perform a skin excision today.  A local anesthetic, similar to the kind that a dentist uses when filling a cavity, will be injected with a very small needle into the skin area to be sampled.  The injected skin and tissue underneath should “go to sleep” and become numbed so that no further pain should be felt.  A scalpel will be used to cut around the lesion and tissue will be submitted to pathology for examination.  Depending on the diagnosis the lesion will be excised with a certain amount of normal skin to help assure completeness of lesion removal.  The physician will discuss in advance the anticipated size and extent of removal.   Bleeding will occur, but it will stopped with direct pressure, sutures, and electrocautery.    Surgical “Vaseline-type” ointment will also applied after the procedure to help create a barrier between the wound and the outside world.      Risks and Potential Complications  The advantage of a skin excision is that it allows us to remove a problem lesion quickly.  Although this usually permits the lesion to heal as soon as possible with the least scarring, there are some risks and potential complications that include but are not limited to the following:  Some bleeding is normal at time of procedure and some bleeding on gauze is normal  the first few days after surgery.  Profuse bleeding and bleeding with swelling  and pain should be reported as detailed  below  Infection is uncommon in skin surgery.  Infection should be reported and is indicated by pain, redness, and discharge of purulent material.  Some dull to at time sharp pain could occur initially the day after surgery.  Persistent pain or increasing pain days after surgery is not expected and should be reported.  Every effort is made to minimize scar, but location, size, and genetics do play a role in scar appearance.  A surgical wound does not achieve its optimal appearance until 6 months.  There are several treatments available if scarring would be problematic including scar creams, silicone pad, laser and scar revision.  Skin discoloration can occur especially in people of color.  Its important to avoid sun on wound in first 6 months after surgery.  Treatment is available if pigment is problematic.  Incomplete removal of the lesion or recurrence of lesion can occur and this would then require further treatment and more surgery.  Nerve Damage/Numbness/Loss of Function is very rare, but is most likely to occur if lesion is large or if it is in a high risk location  Allergic Reaction to lidocaine is rare.  More commonly,  epinephrine is used  with the lidocaine.  Occasionally, epinephrine (aka adrenalin) may cause a brief  feeling of anxiety or jitteriness.  The person at the microscope  (pathologist) may provide additional information that was unexpected. This unexpected finding could provoke the need for additional treatment or evaluation.    What You Will Need to Do After the Procedure  Keep the area clean and dry the first day. Try NOT to remove the bandage for the first day.  Gently clean the area with soap and water and apply Vaseline ointment (this is over the counter and not a prescription) to the excision  site for up to 2 weeks.    Apply a clean appropriately sized bandage to area.  Gauze and paper tape are recommended for sensitive skin.  Return for suture  removal as instructed (generally 1 week for the face, 2 weeks for the body).  Take Acetaminophen (Tylenol) for discomfort, if no contraindications.  Do NOT take Ibuprofen or aspirin unless specifically told to do so by your Dermatologist because these medications can make bleeding worse.  Call our office immediately for signs of infection: fever, chills, increased redness, warmth, tenderness, discomfort/pain, or pus or foul smell coming from the wound.    If bleeding is noticed, place a clean cloth over the area and apply firm pressure for thirty minutes.  Check the wound ONLY after 30 minutes of direct pressure; do not cheat and sneak a peak, as that does not count.  If bleeding persists after 30 minutes of legitimate direct pressure, then try one more round of direct pressure for an additional 10 minutes to the area.  Should the bleeding become heavier or not stop after the second attempt, call [facility] directly at [phone] (SKIN) or, if after hours, go to your local Emergency Room/Emergency Department.

## 2024-04-22 PROCEDURE — 88305 TISSUE EXAM BY PATHOLOGIST: CPT | Performed by: STUDENT IN AN ORGANIZED HEALTH CARE EDUCATION/TRAINING PROGRAM

## 2024-04-22 PROCEDURE — 88341 IMHCHEM/IMCYTCHM EA ADD ANTB: CPT | Performed by: STUDENT IN AN ORGANIZED HEALTH CARE EDUCATION/TRAINING PROGRAM

## 2024-04-22 PROCEDURE — 88342 IMHCHEM/IMCYTCHM 1ST ANTB: CPT | Performed by: STUDENT IN AN ORGANIZED HEALTH CARE EDUCATION/TRAINING PROGRAM

## 2024-04-23 NOTE — RESULT ENCOUNTER NOTE
DERMATOPATHOLOGY RESULT NOTE    Results reviewed by ordering physician.  Called patient to personally discuss results. Discussed results with patient, clear margins however severe atypia. Counseled to continue to monitor and let us know if any sign of recurrence      Instructions for Clinical Derm Team:   (remember to route Result Note to appropriate staff):    None    Result & Plan by Specimen:    Specimen A: benign  Plan: margins clear      Tissue Exam: [id number]  Order: [id number]   Status: Final result      Visible to patient: Yes (seen)      Dx: Neoplasm of uncertain behavior of skin    0 Result Notes     Component   Case Report  Surgical Pathology Report                         Case: [id number]                                  Authorizing Provider:  Marilyn Gabriel MD              Collected:           04/17/2024 1036              Ordering Location:     [facility]      Received:            04/17/2024 1036                                     [place]                                                                      Pathologist:           Mai Vidales MD                                                          Specimen:    Skin, Other, A: right breast                                                            Final Diagnosis  A. Skin, right breast, excision:    Lentiginous compound dysplastic nevus with moderate to severe cytologic atypia; not seen at examined inked specimen margins.      Electronically signed by Mai Vidales MD on 4/22/2024 at  6:58 PM  Additional Information   All reported additional testing was performed with appropriately reactive controls.  These tests were developed and their performance characteristics determined by [facility] or appropriate performing facility, though some tests may be performed on tissues which have not been validated for performance characteristics (such as staining performed on alcohol exposed cell blocks and decalcified tissues).   "Results should be interpreted with caution and in the context of the patients' clinical condition. These tests may not be cleared or approved by the U.S. Food and Drug Administration, though the FDA has determined that such clearance or approval is not necessary. These tests are used for clinical purposes and they should not be regarded as investigational or for research. This laboratory has been approved by [place] 88, designated as a high-complexity laboratory and is qualified to perform these tests.  .  Gross Description   A. The specimen is received in formalin, labeled with the patient's name and hospital number, and is designated \" right breast\".  It consists of an unoriented, 2.6 x 1.1 cm elliptical fragment of pale tan-white, smooth skin excised to an average depth of 0.7 cm.  The paracentral skin surface displays a ill-defined, 0.8 x 0.8 cm markedly variegated, pale gray-white to yellow-brown macular lesion with irregular borders that comes within 0.2 cm of the nearest long margin.  The margin is inked green and the skin surface at the tips red.  Sectioning reveals all areas to remain superficial.  The specimen is entirely submitted as follows:    A1: Tips between sponges  A2-A4: Sequential central cross-sections with lesion in A2-A3    Note: The estimated total formalin fixation time based upon information provided by the submitting clinician and the standard processing schedule is under 72 hours.    [place]  Clinical Information   ATTENTION:  DERMPATH GROUP    SPECIMEN A; Skin; Anatomic Location: right breast; Procedure/Protocol: Skin Specimen (submit in [facility]):Incisional Biopsy (e.g., wedge; simple closure is included) (CPT 46133; each additional incisional biopsy is CPT 47688)  23 y.o. year old  Female with a Morphological Description: irregular brown macule  Differential Diagnosis and/or Specific Clinical Question: Dyplastic nevus  Resulting Agency [facility]      "

## 2024-04-30 ENCOUNTER — APPOINTMENT (OUTPATIENT)
Dept: LAB | Facility: CLINIC | Age: 24
End: 2024-04-30

## 2024-04-30 DIAGNOSIS — Z00.8 ENCOUNTER FOR OTHER GENERAL EXAMINATION: ICD-10-CM

## 2024-04-30 LAB
CHOLEST SERPL-MCNC: 152 MG/DL
EST. AVERAGE GLUCOSE BLD GHB EST-MCNC: 103 MG/DL
HBA1C MFR BLD: 5.2 %
HDLC SERPL-MCNC: 47 MG/DL
LDLC SERPL CALC-MCNC: 88 MG/DL (ref 0–100)
NONHDLC SERPL-MCNC: 105 MG/DL
TRIGL SERPL-MCNC: 83 MG/DL

## 2024-04-30 PROCEDURE — 36415 COLL VENOUS BLD VENIPUNCTURE: CPT

## 2024-04-30 PROCEDURE — 83036 HEMOGLOBIN GLYCOSYLATED A1C: CPT

## 2024-04-30 PROCEDURE — 80061 LIPID PANEL: CPT

## 2024-05-01 ENCOUNTER — CLINICAL SUPPORT (OUTPATIENT)
Dept: DERMATOLOGY | Facility: CLINIC | Age: 24
End: 2024-05-01

## 2024-05-01 DIAGNOSIS — Z48.02 ENCOUNTER FOR REMOVAL OF SUTURES: Primary | ICD-10-CM

## 2024-05-01 PROCEDURE — RECHECK: Performed by: STUDENT IN AN ORGANIZED HEALTH CARE EDUCATION/TRAINING PROGRAM

## 2024-05-01 NOTE — PROGRESS NOTES
"Suture removal    Date/Time: 5/1/2024 11:30 AM    Performed by: Kera Richardson RN  Authorized by: Mai Vidales MD  Universal Protocol:  Consent: Verbal consent obtained. Written consent not obtained.  Risks and benefits: risks, benefits and alternatives were discussed  Consent given by: patient  Time out: Immediately prior to procedure a \"time out\" was called to verify the correct patient, procedure, equipment, support staff and site/side marked as required.  Timeout called at: 5/1/2024 11:35 AM.  Patient understanding: patient states understanding of the procedure being performed  Patient consent: the patient's understanding of the procedure matches consent given  Procedure consent: procedure consent matches procedure scheduled  Relevant documents: relevant documents not present or verified  Test results: test results not available  Site marked: the operative site was not marked  Radiology Images displayed and confirmed. If images not available, report reviewed: imaging studies not available  Patient identity confirmed: verbally with patient      Patient location:  Clinic  Location:     Laterality:  Right    Location:  Trunk    Trunk location:  Breast    Breast location:  R breast  Procedure details:     Tools used:  Suture removal kit    Wound appearance:  No sign(s) of infection, good wound healing, clean, moist and pink    Number of sutures removed:  8  Post-procedure details:     Post-removal:  Band-Aid applied (Vaseline applied)    Patient tolerance of procedure:  Tolerated well, no immediate complications  Comments:      Patient advised to use Vaseline and a band aid for 1-2 more weeks to aid in the wound healing process. Patient also made aware of signs of infection to monitor for such as increased pain, redness, fever or chills, drainage.       Before suture removal     After suture removal   "

## 2024-05-06 ENCOUNTER — OFFICE VISIT (OUTPATIENT)
Age: 24
End: 2024-05-06
Payer: COMMERCIAL

## 2024-05-06 VITALS
BODY MASS INDEX: 30.39 KG/M2 | SYSTOLIC BLOOD PRESSURE: 110 MMHG | OXYGEN SATURATION: 98 % | HEART RATE: 72 BPM | HEIGHT: 64 IN | DIASTOLIC BLOOD PRESSURE: 70 MMHG | WEIGHT: 178 LBS | TEMPERATURE: 97.9 F

## 2024-05-06 DIAGNOSIS — Z13.228 SCREENING FOR METABOLIC DISORDER: Primary | ICD-10-CM

## 2024-05-06 DIAGNOSIS — Z00.00 ENCOUNTER FOR ANNUAL PHYSICAL EXAM: ICD-10-CM

## 2024-05-06 PROCEDURE — 99395 PREV VISIT EST AGE 18-39: CPT | Performed by: NURSE PRACTITIONER

## 2024-05-06 NOTE — ASSESSMENT & PLAN NOTE
-due for CBC and CMP  -Continue with well-balanced diet, encouraged daily exercise  -She is up-to-date with cervical cancer screening  -She is up-to-date with vaccinations  -Encourage monthly self breast examination  -Follow-up in 1 year for annual physical or sooner if needed

## 2024-05-06 NOTE — PROGRESS NOTES
[facility] - [facility] [place]  Well Adult Female Physical Visit  Patient ID: Socorro Wen    : 2000  Age/Gender: 23 y.o. female     DATE: 2024      Assessment/Plan:    Encounter for annual physical exam  -due for CBC and CMP  -Continue with well-balanced diet, encouraged daily exercise  -She is up-to-date with cervical cancer screening  -She is up-to-date with vaccinations  -Encourage monthly self breast examination  -Follow-up in 1 year for annual physical or sooner if needed       Diagnoses and all orders for this visit:    Screening for metabolic disorder  -     Comprehensive metabolic panel; Future  -     CBC and differential    Encounter for annual physical exam          Subjective:     Socorro Wen is a 23 y.o. female who presents to the office on 2024 for a health maintenance physical.    HPI  The following portions of the patient's history were reviewed and updated as appropriate: allergies, current medications, past family history, past medical history, past social history, past surgical history, and problem list.    Pt reports overall health:  pretty good   Last Physical:   Last GYN Exam: yearly      Healthy Diet:  well balanced  Routine Exercise:  denies  Weight Concerns:  denies, but would like to lose some    Problems with vision:  denies  Last Eye Exam:  unknown , maybe a year ago    Problems with Hearing:  denies    Routine Dental Exams:  every 6months     Smoking History:  former  ETOH Use:  rare  Illegal Drug Use:  denies  Caffeine Use:  one cup a day    Reproductive Health:    Last PAP:    History of abnormal PAP:  denies   LMP:  2 weeks  Sexually Active:  denies  Contraceptive:  denies  Problems with menstrual cycle:  denies   Vaginal Discharge:  denies     Reports family history of breast cancer   Self Breast Exams:  denies    Depression Screening:  PHQ-2/9 Depression Screening    Little interest or pleasure in doing  things: 0 - not at all  Feeling down, depressed, or hopeless: 0 - not at all  PHQ-2 Score: 0  PHQ-2 Interpretation: Negative depression screen         Family History of Colon CA:  denies      Last Labs:      Review of Systems   Constitutional:  Negative for activity change, appetite change, chills, diaphoresis and fever.   HENT:  Negative for congestion, ear discharge, ear pain, postnasal drip, rhinorrhea, sinus pressure, sinus pain and sore throat.    Eyes:  Negative for pain, discharge, itching and visual disturbance.   Respiratory:  Negative for cough, chest tightness, shortness of breath and wheezing.    Cardiovascular:  Negative for chest pain, palpitations and leg swelling.   Gastrointestinal:  Negative for abdominal pain, constipation, diarrhea, nausea and vomiting.   Endocrine: Negative for polydipsia, polyphagia and polyuria.   Genitourinary:  Negative for difficulty urinating, dysuria and urgency.   Musculoskeletal:  Negative for arthralgias, back pain and neck pain.   Skin:  Negative for rash and wound.   Neurological:  Negative for dizziness, weakness, numbness and headaches.         Patient Active Problem List   Diagnosis    Head injury    Intractable acute post-traumatic headache    Eczema of both upper extremities    Musculoskeletal pain of right upper extremity    Trapezius muscle spasm    Smoking    Elevated blood-pressure reading without diagnosis of hypertension    Postpartum care and examination    Obesity    Acute appendicitis    Encounter for annual physical exam    Pain in left foot    Ankle weakness    Ankle stiff, left       Past Medical History:   Diagnosis Date    Allergic rhinitis     Eczema     Kidney stone      (spontaneous vaginal delivery) 2022    Varicella     immunized       Past Surgical History:   Procedure Laterality Date    APPENDECTOMY LAPAROSCOPIC N/A 2022    Procedure: APPENDECTOMY LAPAROSCOPIC, possible open;  Surgeon: Lauryn Ullrich, DO;  Location: [facility]  OR;  Service: General    SKIN BIOPSY      WISDOM TOOTH EXTRACTION           Current Outpatient Medications:     norethindrone-ethinyl estradiol (Junel FE 1/20) 1-20 MG-MCG per tablet, Take 1 tablet by mouth daily, Disp: 84 tablet, Rfl: 4    triamcinolone (KENALOG) 0.1 % ointment, Apply topically 2 (two) times a day, Disp: 30 g, Rfl: 3    No Known Allergies    Social History     Socioeconomic History    Marital status: Single     Spouse name: None    Number of children: None    Years of education: None    Highest education level: None   Occupational History    None   Tobacco Use    Smoking status: Former     Current packs/day: 0.00     Average packs/day: 0.3 packs/day for 3.0 years (0.8 ttl pk-yrs)     Types: Cigarettes     Start date: 2018     Quit date: 2021     Years since quitting: 3.3    Smokeless tobacco: Never    Tobacco comments:     vapes   Vaping Use    Vaping status: Former    Quit date: 6/15/2021    Substances: Nicotine   Substance and Sexual Activity    Alcohol use: Yes     Comment: 1-2 drinks on a rare occasion    Drug use: No    Sexual activity: Yes     Partners: Male     Birth control/protection: OCP   Other Topics Concern    None   Social History Narrative    None     Social Determinants of Health     Financial Resource Strain: Not on file   Food Insecurity: No Food Insecurity (11/2/2022)    Hunger Vital Sign     Worried About Running Out of Food in the Last Year: Never true     Ran Out of Food in the Last Year: Never true   Transportation Needs: Not on file   Physical Activity: Not on file   Stress: Not on file   Social Connections: Not on file   Intimate Partner Violence: Not on file   Housing Stability: Low Risk  (11/2/2022)    Housing Stability Vital Sign     Unable to Pay for Housing in the Last Year: No     Number of Places Lived in the Last Year: 1     Unstable Housing in the Last Year: No       Family History   Problem Relation Age of Onset    Melanoma Mother     No Known Problems Father      "No Known Problems Brother     No Known Problems Brother     Mental illness Neg Hx     Substance Abuse Neg Hx        Immunization History   Administered Date(s) Administered    COVID-19 Moderna Vac BIVALENT 12 Yr+ IM 0.5 ML 07/14/2023    DTaP 5 1999, 01/29/2000, 05/27/2000, 08/25/2001, 01/21/2005    H1N1, All Formulations 10/31/2009    HPV9 10/27/2015, 12/30/2015, 05/04/2016    Hep A, ped/adol, 2 dose 10/27/2015, 12/20/2018    Hep B, adult 03/25/2000, 05/27/2000, 05/02/2001    Hepatitis A 10/27/2015, 12/20/2018    Hib (PRP-OMP) 1999, 03/25/2000, 10/07/2000, 05/02/2001    INFLUENZA 10/31/2009, 11/16/2010    IPV 1999, 03/25/2000, 08/25/2001    Influenza Quadrivalent Preservative Free 3 years and older IM 10/27/2015    MMR 01/10/2001, 01/21/2005    Meningococcal, Unknown Serogroups 10/01/2011, 10/27/2015    Tdap 10/01/2011, 06/09/2022    Varicella 01/10/2001, 08/14/2009        Health Maintenance   Topic Date Due    IPV Vaccine (4 of 4 - 4-dose series) 09/03/2003    Chlamydia Screening  08/04/2023    COVID-19 Vaccine (2 - 2023-24 season) 09/08/2023    Influenza Vaccine (Season Ended) 09/01/2024    Depression Screening  05/06/2025    Annual Physical  05/06/2025    Cervical Cancer Screening  09/20/2026    DTaP,Tdap,and Td Vaccines (8 - Td or Tdap) 06/09/2032    Zoster Vaccine (1 of 2) 09/03/2049    HIV Screening  Completed    Hepatitis C Screening  Completed    HIB Vaccine  Completed    Hepatitis A Vaccine  Completed    HPV Vaccine  Completed    Pneumococcal Vaccine: Pediatrics (0 to 5 Years) and At-Risk Patients (6 to 64 Years)  Aged Out    Meningococcal ACWY Vaccine  Aged Out         Objective:  Vitals:    05/06/24 0857   BP: 110/70   BP Location: Left arm   Patient Position: Sitting   Cuff Size: Standard   Pulse: 72   Temp: 97.9 °F (36.6 °C)   TempSrc: Temporal   SpO2: 98%   Weight: 80.7 kg (178 lb)   Height: 5' 4\" (1.626 m)     Wt Readings from Last 3 Encounters:   05/06/24 80.7 kg (178 lb)   04/17/24 " 79.8 kg (176 lb)   03/06/24 78.9 kg (174 lb)     Body mass index is 30.55 kg/m².  No results found.       Physical Exam  Constitutional:       General: She is not in acute distress.     Appearance: She is well-developed. She is not diaphoretic.   HENT:      Head: Normocephalic and atraumatic.      Right Ear: External ear normal.      Left Ear: External ear normal.      Nose: Nose normal.      Mouth/Throat:      Mouth: Mucous membranes are moist.      Pharynx: No oropharyngeal exudate or posterior oropharyngeal erythema.   Eyes:      General:         Right eye: No discharge.         Left eye: No discharge.      Conjunctiva/sclera: Conjunctivae normal.      Pupils: Pupils are equal, round, and reactive to light.   Neck:      Thyroid: No thyromegaly.   Cardiovascular:      Rate and Rhythm: Normal rate and regular rhythm.      Heart sounds: Normal heart sounds. No murmur heard.     No friction rub. No gallop.   Pulmonary:      Effort: Pulmonary effort is normal. No respiratory distress.      Breath sounds: Normal breath sounds. No stridor. No wheezing or rales.   Abdominal:      General: Bowel sounds are normal. There is no distension.      Palpations: Abdomen is soft.      Tenderness: There is no abdominal tenderness.   Musculoskeletal:      Cervical back: Normal range of motion and neck supple.   Lymphadenopathy:      Cervical: No cervical adenopathy.   Skin:     General: Skin is warm and dry.      Findings: No erythema or rash.   Neurological:      Mental Status: She is alert and oriented to person, place, and time.   Psychiatric:         Behavior: Behavior normal.         Thought Content: Thought content normal.         Judgment: Judgment normal.             Future Appointments   Date Time Provider Department Center   5/28/2024 10:40 AM Phuong Huntley MD [facility]   9/24/2024  9:30 AM Rosemary Nolan MD  [facility]  [facility]   5/12/2025  9:20 AM PARAG George [facility]  [facility]       PARAG George  [facility] [place]    Patient Care Team:  PARAG George as PCP - General (Family Medicine)  Barbie Barney MD as PCP - PCP-[place] (RT[facility])  MD China Davidson MD (Maternal and Fetal Medicine)  Nikunj Bruce MD (Maternal and Fetal Medicine)  Daly Sierra MD (Maternal and Fetal Medicine)  Veronica Easton MD (Maternal and Fetal Medicine)  Tarun Haro MD (Maternal and Fetal Medicine)  Martín Hagen MD (Maternal and Fetal Medicine)

## 2024-06-18 DIAGNOSIS — Z00.6 ENCOUNTER FOR EXAMINATION FOR NORMAL COMPARISON OR CONTROL IN CLINICAL RESEARCH PROGRAM: ICD-10-CM

## 2024-06-25 ENCOUNTER — APPOINTMENT (OUTPATIENT)
Dept: LAB | Facility: CLINIC | Age: 24
End: 2024-06-25

## 2024-06-25 DIAGNOSIS — Z00.6 ENCOUNTER FOR EXAMINATION FOR NORMAL COMPARISON OR CONTROL IN CLINICAL RESEARCH PROGRAM: ICD-10-CM

## 2024-06-25 DIAGNOSIS — Z13.228 SCREENING FOR METABOLIC DISORDER: ICD-10-CM

## 2024-06-25 PROCEDURE — 36415 COLL VENOUS BLD VENIPUNCTURE: CPT

## 2024-07-03 ENCOUNTER — OFFICE VISIT (OUTPATIENT)
Dept: DERMATOLOGY | Facility: CLINIC | Age: 24
End: 2024-07-03
Payer: COMMERCIAL

## 2024-07-03 ENCOUNTER — TELEPHONE (OUTPATIENT)
Age: 24
End: 2024-07-03

## 2024-07-03 VITALS — WEIGHT: 178 LBS | BODY MASS INDEX: 30.55 KG/M2 | TEMPERATURE: 97.8 F

## 2024-07-03 DIAGNOSIS — D22.72 MULTIPLE BENIGN MELANOCYTIC NEVI OF BOTH UPPER EXTREMITIES, BOTH LOWER EXTREMITIES, AND TRUNK: ICD-10-CM

## 2024-07-03 DIAGNOSIS — L81.4 SOLAR LENTIGO: ICD-10-CM

## 2024-07-03 DIAGNOSIS — D22.5 MULTIPLE BENIGN MELANOCYTIC NEVI OF BOTH UPPER EXTREMITIES, BOTH LOWER EXTREMITIES, AND TRUNK: ICD-10-CM

## 2024-07-03 DIAGNOSIS — D22.62 MULTIPLE BENIGN MELANOCYTIC NEVI OF BOTH UPPER EXTREMITIES, BOTH LOWER EXTREMITIES, AND TRUNK: ICD-10-CM

## 2024-07-03 DIAGNOSIS — D22.61 MULTIPLE BENIGN MELANOCYTIC NEVI OF BOTH UPPER EXTREMITIES, BOTH LOWER EXTREMITIES, AND TRUNK: ICD-10-CM

## 2024-07-03 DIAGNOSIS — D22.71 MULTIPLE BENIGN MELANOCYTIC NEVI OF BOTH UPPER EXTREMITIES, BOTH LOWER EXTREMITIES, AND TRUNK: ICD-10-CM

## 2024-07-03 DIAGNOSIS — Z86.018 HISTORY OF DYSPLASTIC NEVUS: Primary | ICD-10-CM

## 2024-07-03 DIAGNOSIS — L20.9 ATOPIC DERMATITIS, UNSPECIFIED TYPE: ICD-10-CM

## 2024-07-03 PROCEDURE — 99214 OFFICE O/P EST MOD 30 MIN: CPT

## 2024-07-03 NOTE — PROGRESS NOTES
"[facility] Note     Patient Name: Socorro Wen  Encounter Date: 07/03/24     Have you been cared for by a [facility] Dermatologist in the last 3 years and, if so, which description applies to you?    Yes.  I have been here within the last 3 years, and my medical history has NOT changed since that time.  I am FEMALE/of child-bearing potential.    REVIEW OF SYSTEMS:  Have you recently had or currently have any of the following? No changes in my recent health.   PAST MEDICAL HISTORY:  Have you personally ever had or currently have any of the following?  If \"YES,\" then please provide more detail. No changes in my medical history.   HISTORY OF IMMUNOSUPPRESSION: Do you have a history of any of the following:  Systemic Immunosuppression such as Diabetes, Biologic or Immunotherapy, Chemotherapy, Organ Transplantation, Bone Marrow Transplantation?  No     Answering \"YES\" requires the addition of the dotphrase \"IMMUNOSUPPRESSED\" as the first diagnosis of the patient's visit.   FAMILY HISTORY:  Any \"first degree relatives\" (parent, brother, sister, or child) with the following?     Yes  changes in my family's known health. Mother HX of MM    PATIENT EXPERIENCE:    Do you want the Dermatologist to perform a COMPLETE skin exam today including a clinical examination under the \"bra and underwear\" areas?  Yes  If necessary, do we have your permission to call and leave a detailed message on your Preferred Phone number that includes your specific medical information?  Yes      No Known Allergies   Current Outpatient Medications:     norethindrone-ethinyl estradiol (Junel FE 1/20) 1-20 MG-MCG per tablet, Take 1 tablet by mouth daily, Disp: 84 tablet, Rfl: 4    triamcinolone (KENALOG) 0.1 % ointment, Apply topically 2 (two) times a day, Disp: 30 g, Rfl: 3          Whom besides the patient is providing clinical information about today's encounter?   NO ADDITIONAL HISTORIAN (patient alone provided " history)    Physical Exam and Assessment/Plan by Diagnosis:    Patient's mother diagnosed with melanoma this past year and patient has excess number of moles and history of moderate to severely dysplastic nevus excised in April    History DYSPLASTIC NEVUS    Physical Exam:  Anatomic Location Affected:  Right breast   Morphological Description:  erythematous slightly hypertrophic scar  Pertinent Positives:  Pertinent Negatives:    Additional History of Present Condition:  Excised 04/17/24 clear margin. Patient reports mole had been changing    Assessment and Plan:  Based on a thorough discussion of this condition and the management approach to it (including a comprehensive discussion of the known risks, side effects and potential benefits of treatment), the patient (family) agrees to implement the following specific plan:  Sun protection, SPF sunscreen daily.    Monitor for recurrence    What is a dysplastic nevus?  The term dysplastic nevus is best used for a nevus with a specific microscopic appearance. Only a minority of clinically atypical nevi fulfil microscopic criteria for dysplastic nevus. Many histologically dysplastic nevi are clinically banale (eg, small in size, and uniform in color and in structure)    Histological dysplasia may be mild, moderate or severe. Distinct pathological criteria of a dysplastic naevus are listed here.  The dysplastic nevus may be a junctional nevus (when the melanocytes are found at the epidermodermal junction) or a compound naevus (when the melanocytes are found at the epidermodermal junction and within the dermis).  The naevus cells form a row along the dermoepidermal junction (this is reported as lentiginous proliferation), with or without naevus cells in nests (also called theques).  These theques are often irregular in size and shape and may 'bridge' or join together.  The cells may be spindle-shaped (elongated) or epithelioid (broad, resembling epidermal  keratinocytes).  There may be cytological atypia (cells that are smaller or larger than usual).   There may be fibrosis or scarring in the dermis.  Inflammatory cells may infiltrate the lesion.  Associated blood vessels may be increased in number or enlarged.    Melanoma may arise within a dysplastic nevus, within a non-dysplastic, normal, melanocytic nevus, or more often, in normal-appearing skin.    Dysplastic moles are moles that fit the ABCDE rules of melanoma but are not identified as melanomas when examined under the microscope.  They may indicate an increased risk of melanoma in that person. If there is a family history of melanoma, most experts agree that the person may be at an increased risk for developing a melanoma.  Experts still do not agree on what dysplastic moles mean in patients without a personal or family history of melanoma.  Dysplastic moles are usually larger than common moles and have different colors within it with irregular borders. The appearance can be very similar to a melanoma. Biopsies of dysplastic moles may show abnormalities which are different from a regular mole.                                        MELANOCYTIC NEVI-patient has multiple mildly atypical nevi on clinical exam but all have been present for many years without change  -Relevant exam: Scattered over the trunk/extremities are mostly homogenously pigmented brown macules and papules some with pink coloring. ELM performed and without concerning findings.  - Exam and clinical history consistent with melanocytic nevi  - Educated on the ABCDE's of melanoma  - Counseled to return to clinic prior to scheduled appointment should any of these lesions change or should any new lesions of concern arise  -Take photos of moles every couple of months to document any new moles or changing moles  - Counseled on use of sun protection daily. Reviewed latest FDA sunscreen guidelines, including use of broad spectrum (UVA and UVB blocking)  sunscreen or sun protective clothing with SPF 30-50 every 2-3 hours and reapplied after exposure to water; use of photoprotective clothing, including a broad brim hat and UPF rated clothing if outdoors for several hours; avoid use of tanning beds as these pose significant risk for melanoma and skin cancer.    LENTIGINES  OTHER SKIN CHANGES DUE TO CHRONIC EXPOSURE TO NONIONIZING RADIATION  - Relevant exam: Over sun exposed areas are brown macules. ELM performed and without concerning findings.  - Exam and clinical history consistent with lentigines.  - Educated that these are indicative of prior sun exposure.   - Counseled to return to clinic prior to scheduled appointment should any of these lesions change or should any new lesions of concern arise.  - Recommended use of sunscreen as above and below.  - Counseled on use of sun protection daily. Reviewed latest FDA sunscreen guidelines, including use of broad spectrum (UVA and UVB blocking) sunscreen or sun protective clothing with SPF 30-50 every 2-3 hours and reapplied after exposure to water; use of photoprotective clothing, including a broad brim hat and UPF rated clothing if outdoors for several hours; avoid use of tanning beds as these pose significant risk for melanoma and skin cancer.    CHERRY ANGIOMAS  - Relevant exam: Scattered over the trunk/extremities are red papules  - Exam and clinical history consistent with cherry angiomas  - Educated that these are benign  - Educated that removal is considered aesthetic and would incur a fee.  - Patient does not wish to pursue removal at this time but will contact us should this change.    ATOPIC DERMATITIS (ECZEMA)     Physical exam:  notable for xerotic plaques over the antecubital fossae     Assessment/Plan:    History and physical consistent with atopic dermatitis  Educated patient on atopic dermatitis, including natural progression of disease with expected periods of quiescence and flaring.    Discussed  treatment options including general skin care recommendations, topical anti-inflammatories (steroids, calcineurin inhibitors), dupixent.  Based on a thorough discussion of this condition and the management approach to it (including a comprehensive discussion of the known risks, side effects and potential benefits of treatment), the patient (family) agrees to implement the following specific plan:    ALWAYS APPLY ANY PRESCRIBED MEDICINE TO THE SKIN FIRST. THEN APPLY A MOISTURIZER    MAINTENANCE - Apply at least 2 times a day every day to all skin even if skin is “normal,” or without scale/redness. Apply once after bath and once any other time during the day. Best to apply moisturizer to moist skin after bath (do not dry off completely before applying)    Moisturizer: Use one of the following:  Aquaphor ointment   Cetaphil Cream  Vaseline petroleum jelly  Vanicream  Aveeno Eczema Therapy Balm   Eucerin Cream    FLARING -Follow these instructions when the skin is pink or red and itchy.    · For active areas on the BODY that are mildly involved: apply triamcinolone 0.1% ointment  2 times per day, once after bath and one other time during the day. DO NOT apply to FACE, GENITAL REGION, or “normal” skin (skin that is not red/itchy). Use for up to two weeks to affected area then take one week break.  Then apply moisturizer    TIP For the itching/general skin care:  Keep moisturizer in fridge. Cool is soothing!  Initiate short, lukewarm showers with gentle hydrating soaps (eucerin, cerave, vanicream)  Avoid scratching    Note: Discussed side effects of topical steroid overuse, including, but not limited to, striae, skin atrophy, telangiectasia.    IF YOU HAVE RUN OUT OF YOUR PRESCRIPTION, PLEASE CALL THE PHARMACY TO CHECK IF THERE IS A REFILL. WE OFTEN SEND MULTIPLE REFILLS.     Scribe Attestation      I,:  Judy Vuong am acting as a scribe while in the presence of the attending physician.:       I,:  Alfreda Prince PA-C  personally performed the services described in this documentation    as scribed in my presence.:

## 2024-07-03 NOTE — PATIENT INSTRUCTIONS
ATOPIC DERMATITIS  Assessment/Plan:    History and physical consistent with atopic dermatitis  Educated patient on atopic dermatitis, including natural progression of disease with expected periods of quiescence and flaring.    Discussed treatment options including general skin care recommendations, topical anti-inflammatories (steroids, calcineurin inhibitors), dupixent.  Based on a thorough discussion of this condition and the management approach to it (including a comprehensive discussion of the known risks, side effects and potential benefits of treatment), the patient (family) agrees to implement the following specific plan:    ALWAYS APPLY ANY PRESCRIBED MEDICINE TO THE SKIN FIRST. THEN APPLY A MOISTURIZER    MAINTENANCE - Apply at least 2 times a day every day to all skin even if skin is “normal,” or without scale/redness. Apply once after bath and once any other time during the day. Best to apply moisturizer to moist skin after bath (do not dry off completely before applying)    Moisturizer: Use one of the following:  Aquaphor ointment   Cetaphil Cream  Vaseline petroleum jelly  Vanicream  Aveeno Eczema Therapy Balm   Eucerin Cream    FLARING -Follow these instructions when the skin is pink or red and itchy.    · For active areas on the BODY that are mildly involved: apply triamcinolone 0.1% ointment  2 times per day, once after bath and one other time during the day. DO NOT apply to FACE, GENITAL REGION, or “normal” skin (skin that is not red/itchy). Use for up to two weeks to affected area then take one week break.  Then apply moisturizer    TIP For the itching/general skin care:  Keep moisturizer in fridge. Cool is soothing!  Initiate short, lukewarm showers with gentle hydrating soaps (eucerin, cerave, vanicream)  Avoid scratching    Note: Discussed side effects of topical steroid overuse, including, but not limited to, striae, skin atrophy, telangiectasia.    IF YOU HAVE RUN OUT OF YOUR PRESCRIPTION,  PLEASE CALL THE PHARMACY TO CHECK IF THERE IS A REFILL. WE OFTEN SEND MULTIPLE REFILLS.     MELANOCYTIC NEVI-patient has multiple mildly atypical nevi on clinical exam but all have been present for many years without change  -Relevant exam: Scattered over the trunk/extremities are mostly homogenously pigmented brown macules and papules some with pink coloring. ELM performed and without concerning findings.  - Exam and clinical history consistent with melanocytic nevi  - Educated on the ABCDE's of melanoma  - Counseled to return to clinic prior to scheduled appointment should any of these lesions change or should any new lesions of concern arise  - Counseled on use of sun protection daily. Reviewed latest FDA sunscreen guidelines, including use of broad spectrum (UVA and UVB blocking) sunscreen or sun protective clothing with SPF 30-50 every 2-3 hours and reapplied after exposure to water; use of photoprotective clothing, including a broad brim hat and UPF rated clothing if outdoors for several hours; avoid use of tanning beds as these pose significant risk for melanoma and skin cancer.  -Take photos of moles every couple of months to document any new moles or changing moles

## 2024-07-03 NOTE — TELEPHONE ENCOUNTER
Patient states she recently just started taking the pill again. Her LMP 6/09/24 which is when she restarted the pill. She states she has had some bleeding/spotting since that period and spotting. She states today she started spotting and last week she was bleeding 3-4 days.

## 2024-07-03 NOTE — TELEPHONE ENCOUNTER
Please call this patient for further details about her bleeding. Patient has been on this OCP medication since postpartum in 2023. Previously was doing well on meds. If she is having spotting, may just be irregular bleeding with this cycle vs if she is noticing breakthrough bleeding occurring every cycle.

## 2024-07-03 NOTE — TELEPHONE ENCOUNTER
Ok irregular bleeding can be common if we are starting, stopping, and restarting the medication here and there. If just started back in June, would recommend to continue the medication daily as prescribed and continue to monitor bleeding. Irregular bleeding is the most common in the first 3 months. If continues to have intermenstrual bleeding or breakthrough bleeding mid cycle, we can consider switching her pill to a pill with higher estrogen dose to help stabilize her lining more.

## 2024-07-05 LAB
APOB+LDLR+PCSK9 GENE MUT ANL BLD/T: NOT DETECTED
BRCA1+BRCA2 DEL+DUP + FULL MUT ANL BLD/T: NOT DETECTED
MLH1+MSH2+MSH6+PMS2 GN DEL+DUP+FUL M: NOT DETECTED

## 2024-07-10 ENCOUNTER — OFFICE VISIT (OUTPATIENT)
Dept: URGENT CARE | Facility: CLINIC | Age: 24
End: 2024-07-10
Payer: COMMERCIAL

## 2024-07-10 ENCOUNTER — DOCUMENTATION (OUTPATIENT)
Dept: URGENT CARE | Facility: CLINIC | Age: 24
End: 2024-07-10

## 2024-07-10 VITALS
DIASTOLIC BLOOD PRESSURE: 63 MMHG | SYSTOLIC BLOOD PRESSURE: 108 MMHG | OXYGEN SATURATION: 99 % | HEIGHT: 64 IN | TEMPERATURE: 98.1 F | RESPIRATION RATE: 18 BRPM | WEIGHT: 167 LBS | BODY MASS INDEX: 28.51 KG/M2 | HEART RATE: 90 BPM

## 2024-07-10 DIAGNOSIS — B34.9 VIRAL ILLNESS: Primary | ICD-10-CM

## 2024-07-10 PROCEDURE — 99213 OFFICE O/P EST LOW 20 MIN: CPT | Performed by: PHYSICIAN ASSISTANT

## 2024-07-10 RX ORDER — BENZONATATE 100 MG/1
100 CAPSULE ORAL 3 TIMES DAILY PRN
Qty: 20 CAPSULE | Refills: 0 | Status: SHIPPED | OUTPATIENT
Start: 2024-07-10

## 2024-07-10 NOTE — PROGRESS NOTES
[facility]      NAME: Socorro Wen is a 23 y.o. female  : 2000    MRN: [id number]  DATE: July 10, 2024  TIME: 9:20 AM    Assessment and Plan   Viral illness [B34.9]  1. Viral illness  benzonatate (TESSALON PERLES) 100 mg capsule          Patient Instructions   Infection appears viral.  Recommend symptomatic treatment.  Can take ibuprofen or tylenol as needed for pain or fever.  Over the counter cough and cold medications to help with symptoms.  Use salt water gargles for sore throat and throat lozenges.  Cough drops as needed.  Wash hands frequently to prevent the spread of infection.  If not improving over the next 7-10 days, follow up with PCP.  Symptoms may persist for 10-14 days.  Risks and benefits discussed. Patient understands and agrees with the plan.      If tests have been performed at [facility], our office will contact you with results if changes need to be made to the care plan discussed with you at the visit.  You can review your full results on [facility]'s MyChart.     Follow up with PCP in 3-5 days.      If any of the following occur, please report to your nearest ED for evaluation or call 911.   Difficultly breathing or shortness of breath  Chest pain  Acutely worsening symptoms.   To present to the ER if symptoms worsen.  Chief Complaint     Chief Complaint   Patient presents with    Back Pain    Cough    Sore Throat     Patient states that she has a lot of back pain, vomiting, and has been coughing. She said this started yesterday and felt very lightheaded yesterday.     Vomiting         History of Present Illness   Socorro Wen presents to the clinic c/o    Generalized Body Aches  The current episode started yesterday. The problem occurs constantly. The problem is unchanged. The pain is moderate. Nothing aggravates the symptoms. Associated symptoms include congestion, headaches, a sore throat, fatigue, coughing, vomiting (1x this AM) and muscle aches. Pertinent negatives include no  ear discharge, ear pain, eye discharge, eye itching, eye pain, eye redness, photophobia, fever, chest pain, shortness of breath, wheezing, abdominal pain, diarrhea, nausea or rash. Past treatments include one or more OTC medications. The treatment provided mild relief.       Review of Systems   Review of Systems   Constitutional:  Positive for fatigue. Negative for chills, diaphoresis and fever.   HENT:  Positive for congestion and sore throat. Negative for ear discharge, ear pain and facial swelling.    Eyes:  Negative for photophobia, pain, discharge, redness, itching and visual disturbance.   Respiratory:  Positive for cough. Negative for apnea, chest tightness, shortness of breath and wheezing.    Cardiovascular:  Negative for chest pain and palpitations.   Gastrointestinal:  Positive for vomiting (1x this AM). Negative for abdominal pain, diarrhea and nausea.   Musculoskeletal:  Positive for myalgias.   Skin:  Negative for color change, rash and wound.   Neurological:  Positive for headaches. Negative for dizziness.   Hematological:  Negative for adenopathy.         Current Medications     Long-Term Medications   Medication Sig Dispense Refill    norethindrone-ethinyl estradiol (Junel FE 1/20) 1-20 MG-MCG per tablet Take 1 tablet by mouth daily 84 tablet 4    triamcinolone (KENALOG) 0.1 % ointment Apply topically 2 (two) times a day 30 g 3       Current Allergies     Allergies as of 07/10/2024    (No Known Allergies)            The following portions of the patient's history were reviewed and updated as appropriate: allergies, current medications, past family history, past medical history, past social history, past surgical history and problem list.  Past Medical History:   Diagnosis Date    Allergic rhinitis     Eczema     Kidney stone      (spontaneous vaginal delivery) 2022    Varicella     immunized     Past Surgical History:   Procedure Laterality Date    APPENDECTOMY LAPAROSCOPIC N/A 2022     "Procedure: APPENDECTOMY LAPAROSCOPIC, possible open;  Surgeon: Lauryn Ullrich, DO;  Location: [facility];  Service: General    SKIN BIOPSY      WISDOM TOOTH EXTRACTION       Social History     Socioeconomic History    Marital status: Single     Spouse name: Not on file    Number of children: Not on file    Years of education: Not on file    Highest education level: Not on file   Occupational History    Not on file   Tobacco Use    Smoking status: Former     Current packs/day: 0.00     Average packs/day: 0.3 packs/day for 3.0 years (0.8 ttl pk-yrs)     Types: Cigarettes     Start date: 2018     Quit date: 2021     Years since quitting: 3.5    Smokeless tobacco: Never    Tobacco comments:     vapes   Vaping Use    Vaping status: Former    Quit date: 6/15/2021    Substances: Nicotine   Substance and Sexual Activity    Alcohol use: Yes     Comment: 1-2 drinks on a rare occasion    Drug use: No    Sexual activity: Yes     Partners: Male     Birth control/protection: OCP   Other Topics Concern    Not on file   Social History Narrative    Not on file     Social Determinants of Health     Financial Resource Strain: Not on file   Food Insecurity: No Food Insecurity (11/2/2022)    Hunger Vital Sign     Worried About Running Out of Food in the Last Year: Never true     Ran Out of Food in the Last Year: Never true   Transportation Needs: Not on file   Physical Activity: Not on file   Stress: Not on file   Social Connections: Not on file   Intimate Partner Violence: Not on file   Housing Stability: Low Risk  (11/2/2022)    Housing Stability Vital Sign     Unable to Pay for Housing in the Last Year: No     Number of Places Lived in the Last Year: 1     Unstable Housing in the Last Year: No       Objective   /63 (BP Location: Left arm, Patient Position: Sitting, Cuff Size: Large)   Pulse 90   Temp 98.1 °F (36.7 °C) (Tympanic Core)   Resp 18   Ht 5' 4\" (1.626 m)   Wt 75.8 kg (167 lb)   SpO2 99%   BMI 28.67 kg/m²  LMP: " 7/3/2024     Physical Exam     Physical Exam  Vitals and nursing note reviewed.   Constitutional:       General: She is not in acute distress.     Appearance: She is well-developed. She is not diaphoretic.   HENT:      Head: Normocephalic and atraumatic.      Right Ear: Tympanic membrane and external ear normal.      Left Ear: Tympanic membrane and external ear normal.      Nose: Nose normal.      Mouth/Throat:      Mouth: Mucous membranes are moist.      Pharynx: No oropharyngeal exudate or posterior oropharyngeal erythema.   Eyes:      General: No scleral icterus.        Right eye: No discharge.         Left eye: No discharge.      Conjunctiva/sclera: Conjunctivae normal.   Cardiovascular:      Rate and Rhythm: Regular rhythm.      Heart sounds: Normal heart sounds. No murmur heard.     No friction rub. No gallop.   Pulmonary:      Effort: Pulmonary effort is normal. No respiratory distress.      Breath sounds: Normal breath sounds. No decreased breath sounds, wheezing, rhonchi or rales.   Skin:     General: Skin is warm and dry.      Coloration: Skin is not pale.      Findings: No erythema or rash.   Neurological:      Mental Status: She is alert and oriented to person, place, and time.   Psychiatric:         Behavior: Behavior normal.         Thought Content: Thought content normal.         Judgment: Judgment normal.         Glenys Velásquez PA-C

## 2024-07-10 NOTE — LETTER
July 10, 2024     Patient: Socorro Wen  YOB: 2000  Date of Visit: 7/10/2024      To Whom it May Concern:    Socorro Wen is under my professional care. Socorro was seen in my office on 7/10/2024.     If you have any questions or concerns, please don't hesitate to call.         Sincerely,          Glenys Velásquez PA-C        CC: No Recipients

## 2024-07-11 ENCOUNTER — OFFICE VISIT (OUTPATIENT)
Dept: INTERNAL MEDICINE CLINIC | Age: 24
End: 2024-07-11

## 2024-07-11 VITALS
HEART RATE: 96 BPM | DIASTOLIC BLOOD PRESSURE: 80 MMHG | TEMPERATURE: 98.5 F | BODY MASS INDEX: 28.39 KG/M2 | SYSTOLIC BLOOD PRESSURE: 108 MMHG | OXYGEN SATURATION: 97 % | WEIGHT: 165.4 LBS

## 2024-07-11 DIAGNOSIS — J06.9 VIRAL UPPER RESPIRATORY TRACT INFECTION: Primary | ICD-10-CM

## 2024-07-11 LAB
SARS-COV-2 AG UPPER RESP QL IA: NEGATIVE
SL AMB POCT RAPID FLU A: NEGATIVE
SL AMB POCT RAPID FLU B: NEGATIVE
VALID CONTROL: NORMAL

## 2024-07-11 RX ORDER — GUAIFENESIN/DEXTROMETHORPHAN 100-10MG/5
5 SYRUP ORAL 3 TIMES DAILY PRN
Qty: 118 ML | Refills: 0 | Status: SHIPPED | OUTPATIENT
Start: 2024-07-11 | End: 2024-07-18

## 2024-07-11 RX ORDER — ALBUTEROL SULFATE 90 UG/1
2 AEROSOL, METERED RESPIRATORY (INHALATION) EVERY 4 HOURS PRN
Qty: 20.1 G | Refills: 3 | Status: SHIPPED | OUTPATIENT
Start: 2024-07-11

## 2024-07-11 NOTE — PROGRESS NOTES
[facility]  INTERNAL MEDICINE        NAME: Socorro Wen  AGE: 23 y.o. SEX: female    DATE OF ENCOUNTER: 7/11/2024    ASSESSMENT AND PLAN     1. Viral upper respiratory tract infection  Presents with acute onset of dry cough, myalgia, headache, fever (max temp 101 F), mild sore throat since Tuesday.  Denies any recent sick contacts or travel.  Has tried Tylenol for headache.  Was seen in urgent care and prescribed Tessalon Perles but has not picked up the medication.  Rapid COVID and flu test negative in the clinic  On physical exam, bilateral coarse lung sounds but otherwise normal physical findings including HEENT.  Likely viral infection versus bacterial  Will order Robitussin DM, albuterol.  Instructed patient to drink a lot of fluids and wear mask when possible to avoid spreading.    - POCT Rapid Covid Ag  - dextromethorphan-guaiFENesin (ROBITUSSIN DM)  mg/5 mL syrup; Take 5 mL by mouth 3 (three) times a day as needed for cough for up to 7 days  Dispense: 118 mL; Refill: 0  - albuterol (Proventil HFA) 90 mcg/act inhaler; Inhale 2 puffs every 4 (four) hours as needed for wheezing  Dispense: 20.1 g; Refill: 3    No orders of the defined types were placed in this encounter.        CHIEF COMPLAINT     Chief Complaint   Patient presents with    Cold Like Symptoms     Sneezing, body aches, throwing up, coughing and a fever since yesterday afternoon (highest was 101.4) went to urgent care and was not tested for anything. Symptoms started Tuesday 7/9/24.       HISTORY OF PRESENT ILLNESS     23 year old female with no PMHx who presents as same day visit. She reports acute onset of dry cough, myalgia, headache, fever (max temp 101 F), mild sore throat since Tuesday. Denies any chest pain, SOB, palpitation, abdominal pain, n/v, lightheadedness/dizziness.  Denies any recent sick contact or travel.  Was seen in urgent care and prescribed Tessalon Perles but has not picked up medication yet.   Has been taking Tylenol as needed at home without much relief.      The following portions of the patient's history were reviewed and updated as appropriate: allergies, current medications, past family history, past medical history, past social history, past surgical history and problem list.    REVIEW OF SYSTEMS     Review of Systems   Constitutional: Positive for chills and fever.   HENT:  Positive for sore throat. Negative for congestion and ear pain.    Cardiovascular:  Positive for chest pain. Negative for dyspnea on exertion.   Respiratory:  Positive for cough. Negative for shortness of breath.    Musculoskeletal:  Positive for myalgias.   Gastrointestinal:  Positive for vomiting. Negative for abdominal pain, diarrhea and nausea.   Neurological:  Positive for headaches.   All other systems reviewed and are negative.        All other ROS negative except per HPI    ACTIVE PROBLEM LIST     Patient Active Problem List   Diagnosis    Head injury    Intractable acute post-traumatic headache    Eczema of both upper extremities    Musculoskeletal pain of right upper extremity    Trapezius muscle spasm    Smoking    Elevated blood-pressure reading without diagnosis of hypertension    Postpartum care and examination    Obesity    Acute appendicitis    Encounter for annual physical exam    Pain in left foot    Ankle weakness    Ankle stiff, left       Past Medical History:   Diagnosis Date    Allergic rhinitis     Eczema     Kidney stone      (spontaneous vaginal delivery) 2022    Varicella     immunized       CURRENT MEDICATIONS       Current Outpatient Medications:     benzonatate (TESSALON PERLES) 100 mg capsule, Take 1 capsule (100 mg total) by mouth 3 (three) times a day as needed for cough, Disp: 20 capsule, Rfl: 0    norethindrone-ethinyl estradiol (Junel FE 1/20) 1-20 MG-MCG per tablet, Take 1 tablet by mouth daily, Disp: 84 tablet, Rfl: 4    triamcinolone (KENALOG) 0.1 % ointment, Apply topically 2 (two)  times a day, Disp: 30 g, Rfl: 3    No Known Allergies    Social History   Past Surgical History:   Procedure Laterality Date    APPENDECTOMY LAPAROSCOPIC N/A 11/02/2022    Procedure: APPENDECTOMY LAPAROSCOPIC, possible open;  Surgeon: Lauryn Ullrich, DO;  Location: [facility];  Service: General    SKIN BIOPSY      WISDOM TOOTH EXTRACTION       Family History   Problem Relation Age of Onset    Melanoma Mother     No Known Problems Father     No Known Problems Brother     No Known Problems Brother     Mental illness Neg Hx     Substance Abuse Neg Hx        OBJECTIVE     /80 (BP Location: Left arm, Patient Position: Sitting, Cuff Size: Standard)   Pulse 96   Temp 98.5 °F (36.9 °C) (Temporal)   Wt 75 kg (165 lb 6.4 oz)   SpO2 97% Comment: room air  BMI 28.39 kg/m²     Physical Exam  Vitals reviewed.   Constitutional:       Appearance: Normal appearance. She is normal weight.   HENT:      Head: Normocephalic and atraumatic.      Right Ear: Tympanic membrane, ear canal and external ear normal.      Left Ear: Tympanic membrane, ear canal and external ear normal.      Nose: Nose normal.      Mouth/Throat:      Mouth: Mucous membranes are dry.      Pharynx: Oropharynx is clear. No oropharyngeal exudate or posterior oropharyngeal erythema.   Eyes:      Extraocular Movements: Extraocular movements intact.      Pupils: Pupils are equal, round, and reactive to light.   Cardiovascular:      Rate and Rhythm: Normal rate and regular rhythm.   Pulmonary:      Effort: Pulmonary effort is normal.      Comments: Coarse bilaterally  Abdominal:      General: Bowel sounds are normal.      Palpations: Abdomen is soft.   Neurological:      General: No focal deficit present.      Mental Status: She is alert and oriented to person, place, and time.           Pertinent Laboratory/Diagnostic Studies:     US kidney and bladder    Result Date: 2/21/2024  Impression: Normal. Workstation performed: [id number]       Images and  diagnostics reviewed     HEALTH MAINTENANCE     Health Maintenance   Topic Date Due    IPV Vaccine (4 of 4 - 4-dose series) 09/03/2003    Chlamydia Screening  08/04/2023    COVID-19 Vaccine (2 - 2023-24 season) 09/08/2023    Influenza Vaccine (1) 09/01/2024    Annual Physical  05/06/2025    Depression Screening  07/10/2025    Cervical Cancer Screening  09/20/2026    DTaP,Tdap,and Td Vaccines (8 - Td or Tdap) 06/09/2032    Zoster Vaccine (1 of 2) 09/03/2049    RSV Vaccine Age 60+ Years (1 - 1-dose 60+ series) 09/03/2059    HIV Screening  Completed    Hepatitis C Screening  Completed    HIB Vaccine  Completed    Hepatitis A Vaccine  Completed    HPV Vaccine  Completed    RSV Vaccine age 0-20 Months  Aged Out    Pneumococcal Vaccine: Pediatrics (0 to 5 Years) and At-Risk Patients (6 to 64 Years)  Aged Out    Meningococcal ACWY Vaccine  Aged Out     Immunization History   Administered Date(s) Administered    COVID-19 Moderna Vac BIVALENT 12 Yr+ IM 0.5 ML 07/14/2023    DTaP 5 1999, 01/29/2000, 05/27/2000, 08/25/2001, 01/21/2005    H1N1, All Formulations 10/31/2009    HPV9 10/27/2015, 12/30/2015, 05/04/2016    Hep A, ped/adol, 2 dose 10/27/2015, 12/20/2018    Hep B, adult 03/25/2000, 05/27/2000, 05/02/2001    Hepatitis A 10/27/2015, 12/20/2018    Hib (PRP-OMP) 1999, 03/25/2000, 10/07/2000, 05/02/2001    INFLUENZA 10/31/2009, 11/16/2010    IPV 1999, 03/25/2000, 08/25/2001    Influenza Quadrivalent Preservative Free 3 years and older IM 10/27/2015    MMR 01/10/2001, 01/21/2005    Meningococcal, Unknown Serogroups 10/01/2011, 10/27/2015    Tdap 10/01/2011, 06/09/2022    Varicella 01/10/2001, 08/14/2009       I globally spent 35 minutes face-to-face with the patient and with chart review.     Dangelo Medrano, DO  Internal Medicine PGY-3

## 2024-07-11 NOTE — LETTER
July 11, 2024     Patient: Socorro Wen  YOB: 2000  Date of Visit: 7/11/2024      To Whom it May Concern:    Socorro Wen is under my professional care. Socorro was seen in my office on 7/11/2024. Socorro may return to work on 7/15/2024 .    If you have any questions or concerns, please don't hesitate to call.         Sincerely,          Dangelo Medrano DO        CC: No Recipients

## 2024-07-17 ENCOUNTER — HOSPITAL ENCOUNTER (EMERGENCY)
Facility: HOSPITAL | Age: 24
Discharge: HOME | End: 2024-07-17
Attending: EMERGENCY MEDICINE
Payer: COMMERCIAL

## 2024-07-17 ENCOUNTER — APPOINTMENT (EMERGENCY)
Dept: RADIOLOGY | Facility: HOSPITAL | Age: 24
End: 2024-07-17
Payer: COMMERCIAL

## 2024-07-17 VITALS
HEIGHT: 64 IN | HEART RATE: 86 BPM | OXYGEN SATURATION: 96 % | TEMPERATURE: 97.3 F | SYSTOLIC BLOOD PRESSURE: 125 MMHG | WEIGHT: 163 LBS | RESPIRATION RATE: 18 BRPM | DIASTOLIC BLOOD PRESSURE: 93 MMHG | BODY MASS INDEX: 27.83 KG/M2

## 2024-07-17 DIAGNOSIS — J18.9 PNEUMONIA: ICD-10-CM

## 2024-07-17 DIAGNOSIS — J45.909 REACTIVE AIRWAY DISEASE: Primary | ICD-10-CM

## 2024-07-17 DIAGNOSIS — R05.9 COUGH: ICD-10-CM

## 2024-07-17 LAB
ATRIAL RATE: 77 BPM
EXT PREGNANCY TEST URINE: NEGATIVE
EXT. CONTROL: NORMAL
P AXIS: 55 DEGREES
PR INTERVAL: 148 MS
QRS AXIS: -28 DEGREES
QRSD INTERVAL: 68 MS
QT INTERVAL: 372 MS
QTC INTERVAL: 420 MS
T WAVE AXIS: 47 DEGREES
VENTRICULAR RATE: 77 BPM

## 2024-07-17 PROCEDURE — 93010 ELECTROCARDIOGRAM REPORT: CPT | Performed by: INTERNAL MEDICINE

## 2024-07-17 PROCEDURE — 99285 EMERGENCY DEPT VISIT HI MDM: CPT

## 2024-07-17 PROCEDURE — 71046 X-RAY EXAM CHEST 2 VIEWS: CPT

## 2024-07-17 PROCEDURE — 94760 N-INVAS EAR/PLS OXIMETRY 1: CPT

## 2024-07-17 PROCEDURE — 93005 ELECTROCARDIOGRAM TRACING: CPT

## 2024-07-17 PROCEDURE — 99284 EMERGENCY DEPT VISIT MOD MDM: CPT | Performed by: EMERGENCY MEDICINE

## 2024-07-17 PROCEDURE — 81025 URINE PREGNANCY TEST: CPT

## 2024-07-17 PROCEDURE — 94644 CONT INHLJ TX 1ST HOUR: CPT

## 2024-07-17 PROCEDURE — 94640 AIRWAY INHALATION TREATMENT: CPT

## 2024-07-17 RX ORDER — AMOXICILLIN 500 MG/1
1000 CAPSULE ORAL EVERY 8 HOURS SCHEDULED
Qty: 30 CAPSULE | Refills: 0 | Status: SHIPPED | OUTPATIENT
Start: 2024-07-17 | End: 2024-07-22

## 2024-07-17 RX ORDER — PREDNISONE 20 MG/1
40 TABLET ORAL ONCE
Status: COMPLETED | OUTPATIENT
Start: 2024-07-17 | End: 2024-07-17

## 2024-07-17 RX ORDER — ALBUTEROL SULFATE 90 UG/1
2 AEROSOL, METERED RESPIRATORY (INHALATION) EVERY 4 HOURS PRN
Qty: 18 G | Refills: 0 | Status: SHIPPED | OUTPATIENT
Start: 2024-07-17

## 2024-07-17 RX ORDER — SODIUM CHLORIDE FOR INHALATION 0.9 %
12 VIAL, NEBULIZER (ML) INHALATION ONCE
Status: COMPLETED | OUTPATIENT
Start: 2024-07-17 | End: 2024-07-17

## 2024-07-17 RX ORDER — PREDNISONE 20 MG/1
40 TABLET ORAL DAILY
Qty: 10 TABLET | Refills: 0 | Status: SHIPPED | OUTPATIENT
Start: 2024-07-17 | End: 2024-07-22

## 2024-07-17 RX ORDER — AZELASTINE 1 MG/ML
1 SPRAY, METERED NASAL 2 TIMES DAILY
Qty: 1 ML | Refills: 0 | Status: SHIPPED | OUTPATIENT
Start: 2024-07-17

## 2024-07-17 RX ORDER — ALBUTEROL SULFATE 90 UG/1
2 AEROSOL, METERED RESPIRATORY (INHALATION) ONCE
Status: COMPLETED | OUTPATIENT
Start: 2024-07-17 | End: 2024-07-17

## 2024-07-17 RX ADMIN — IPRATROPIUM BROMIDE 1 MG: 0.5 SOLUTION RESPIRATORY (INHALATION) at 12:35

## 2024-07-17 RX ADMIN — ISODIUM CHLORIDE 12 ML: 0.03 SOLUTION RESPIRATORY (INHALATION) at 12:34

## 2024-07-17 RX ADMIN — PREDNISONE 40 MG: 20 TABLET ORAL at 12:20

## 2024-07-17 RX ADMIN — ALBUTEROL SULFATE 10 MG: 2.5 SOLUTION RESPIRATORY (INHALATION) at 12:34

## 2024-07-17 RX ADMIN — ALBUTEROL SULFATE 2 PUFF: 90 AEROSOL, METERED RESPIRATORY (INHALATION) at 13:34

## 2024-07-17 NOTE — ED PROVIDER NOTES
History  Chief Complaint   Patient presents with    Shortness of Breath     Pt has been short of breath and not feeling well for almost one week. Pt states that symptoms just getting worse. Pt does have a cough that is non productive. Pt also having n/v for one week     HPI  MDM  23-year-old female, no major medical history, presents for 1 week of cough, posttussive emesis, dyspnea.  She was seen by primary care for viral syndrome few days ago and was discharged with an inhaler and Tessalon Perles without much relief.  Patient continues to have symptoms and has not been able to tolerate much because of the posttussive emesis.  Currently no fever, no productive cough, no PE risk factors, no recent travel.    On exam patient is well-appearing, mildly erythematous pharyngeal area without any exudate, decreased breath sounds bilaterally, no wheezing, no increased work of breathing, abdomen soft nondistended,    General: VSS, NAD, awake, alert. Well-nourished, well-developed. Appears stated age.   Speaking normally in full sentences.   Head: Normocephalic, atraumatic, nontender.  Eyes: PERRL, EOM-I. No diplopia.   No hyphema.   No subconjunctival hemorrhages.  Symmetrical lids.   ENT: Atraumatic external nose and ears.    MMM  Mild pharyngeal erythema without exudate.   No malocclusion. No stridor. Normal phonation. No drooling. Normal swallowing.   Neck: Symmetric, trachea midline. No JVD.  CV: RRR. +S1/S2  No murmurs or gallops  Peripheral pulses +2 throughout. No chest wall tenderness.   Lungs:   Exam as above  No tachypnea.   Abd: +BS, soft, NT/ND.   MSK:   FROM   Back:   No rashes  Skin: Dry, intact.   Neuro: AAOx3, GCS 15, CN II-XII grossly intact.   Motor grossly intact.  Psychiatric/Behavioral: Appropriate mood and affect   Exam: deferred      Ddx:  reactive airway disease, viral syndrome, will get chest xray to rule out pneumonia.   Plan: Pt was evaluated with chest xray, symptomatically treated with benson neb  for steroids.   Pt had symptomatic relief with treatment with better air movement on reexamination.   Discharged with a course of steroids , refills of albuterol and outpatient follow up, strict return precautions.         Final Dispo:     Pt is hemodynamically stable and clear for discharge with outpatient f/u with their PCP. Return precautions given pt verbalized understanding      Prior to Admission Medications   Prescriptions Last Dose Informant Patient Reported? Taking?   albuterol (Proventil HFA) 90 mcg/act inhaler   No No   Sig: Inhale 2 puffs every 4 (four) hours as needed for wheezing   benzonatate (TESSALON PERLES) 100 mg capsule  Self No No   Sig: Take 1 capsule (100 mg total) by mouth 3 (three) times a day as needed for cough   dextromethorphan-guaiFENesin (ROBITUSSIN DM)  mg/5 mL syrup   No No   Sig: Take 5 mL by mouth 3 (three) times a day as needed for cough for up to 7 days   norethindrone-ethinyl estradiol (Junel FE 1/20) 1-20 MG-MCG per tablet  Self No No   Sig: Take 1 tablet by mouth daily   triamcinolone (KENALOG) 0.1 % ointment  Self No No   Sig: Apply topically 2 (two) times a day      Facility-Administered Medications: None       Past Medical History:   Diagnosis Date    Allergic rhinitis     Eczema     Kidney stone      (spontaneous vaginal delivery) 2022    Varicella     immunized       Past Surgical History:   Procedure Laterality Date    APPENDECTOMY LAPAROSCOPIC N/A 2022    Procedure: APPENDECTOMY LAPAROSCOPIC, possible open;  Surgeon: Lauryn Ullrich, DO;  Location: [facility];  Service: General    SKIN BIOPSY      WISDOM TOOTH EXTRACTION         Family History   Problem Relation Age of Onset    Melanoma Mother     No Known Problems Father     No Known Problems Brother     No Known Problems Brother     Mental illness Neg Hx     Substance Abuse Neg Hx      I have reviewed and agree with the history as documented.    E-Cigarette/Vaping    E-Cigarette Use Former User      Quit Date 6/15/21      E-Cigarette/Vaping Substances    Nicotine Yes     THC No     CBD No     Flavoring No     Other No     Unknown No      Social History     Tobacco Use    Smoking status: Former     Current packs/day: 0.00     Average packs/day: 0.3 packs/day for 3.0 years (0.8 ttl pk-yrs)     Types: Cigarettes     Start date: 2018     Quit date: 2021     Years since quitting: 3.5    Smokeless tobacco: Never    Tobacco comments:     vapes   Vaping Use    Vaping status: Former    Quit date: 6/15/2021    Substances: Nicotine   Substance Use Topics    Alcohol use: Yes     Comment: 1-2 drinks on a rare occasion    Drug use: No        Review of Systems    Physical Exam  ED Triage Vitals [07/17/24 1029]   Temperature Pulse Respirations Blood Pressure SpO2   (!) 97.3 °F (36.3 °C) 86 18 125/93 96 %      Temp Source Heart Rate Source Patient Position - Orthostatic VS BP Location FiO2 (%)   Temporal -- Sitting Left arm --      Pain Score       7             Orthostatic Vital Signs  Vitals:    07/17/24 1029   BP: 125/93   Pulse: 86   Patient Position - Orthostatic VS: Sitting       Physical Exam    ED Medications  Medications   albuterol inhalation solution 10 mg (10 mg Nebulization Given 7/17/24 1234)   ipratropium (ATROVENT) 0.02 % inhalation solution 1 mg (1 mg Nebulization Given 7/17/24 1235)   sodium chloride 0.9 % inhalation solution 12 mL (12 mL Nebulization Given 7/17/24 1234)   predniSONE tablet 40 mg (40 mg Oral Given 7/17/24 1220)   albuterol (PROVENTIL HFA,VENTOLIN HFA) inhaler 2 puff (2 puffs Inhalation Given 7/17/24 1334)       Diagnostic Studies  Results Reviewed       Procedure Component Value Units Date/Time    POCT pregnancy, urine [[id number]]  (Normal) Resulted: 07/17/24 1117    Lab Status: Final result Updated: 07/17/24 1117     EXT Preg Test, Ur Negative     Control Valid                   XR chest 2 views   Final Result by Maunel العراقي MD (07/17 1348)      Left base atelectasis and/or infiltrate.             Workstation performed: [id number]               Procedures  Procedures      ED Course  ED Course as of 07/19/24 0909   Wed Jul 17, 2024   1146 23-year-old female, no major medical history, presents for 1 week of cough, posttussive emesis, dyspnea.  She was seen by primary care for viral syndrome few days ago and was discharged with an inhaler and Tessalon Perles without much relief.  Patient continues to have symptoms and has not been able to tolerate much because of the posttussive emesis.  Currently no fever, no productive cough, no PE risk factors, no recent travel.   1147 On exam patient is well-appearing, mildly erythematous pharyngeal area without any exudate, decreased breath sounds bilaterally, no wheezing, no increased work of breathing, abdomen soft nondistended,   1147 Likely reactive viral disease, will get a chest x-ray to rule out pneumonia, heart neb and prednisone for symptomatic treatment.   1240 Just started with DOHERTY neb, will revaluate after                              SBIRT 20yo+      Flowsheet Row Most Recent Value   Initial Alcohol Screen: US AUDIT-C     1. How often do you have a drink containing alcohol? 1 Filed at: 07/17/2024 1031   2. How many drinks containing alcohol do you have on a typical day you are drinking?  2 Filed at: 07/17/2024 1031   3b. FEMALE Any Age, or MALE 65+: How often do you have 4 or more drinks on one occassion? 0 Filed at: 07/17/2024 1031   Audit-C Score 3 Filed at: 07/17/2024 1031   INDRA: How many times in the past year have you...    Used an illegal drug or used a prescription medication for non-medical reasons? Never Filed at: 07/17/2024 1031                  Medical Decision Making  Amount and/or Complexity of Data Reviewed  Labs: ordered.  Radiology: ordered.    Risk  Prescription drug management.          Disposition  Final diagnoses:   Reactive airway disease   Cough   Pneumonia     Time reflects when diagnosis was documented in both MDM as  applicable and the Disposition within this note       Time User Action Codes Description Comment    7/17/2024  1:21 PM Crystal Heath Add [J45.909] Reactive airway disease     7/17/2024  1:21 PM Crystal Heath Add [R05.9] Cough     7/17/2024  3:25 PM Tessa Jimenez Add [J18.9] Pneumonia           ED Disposition       ED Disposition   Discharge    Condition   Stable    Date/Time   Wed Jul 17, 2024 1324    Comment   Socorro Ameerbshannon discharge to home/self care.                   Follow-up Information       Follow up With Specialties Details Why Contact Info Additional Information    [facility], Nurse Practitioner In 1 week  [address]  [address]  [facility] [address] [phone]       [facility] Emergency Department Emergency Medicine  If symptoms worsen [address] [facility] [id number] [phone] formerly [facility] Emergency Department, [address] [place] [address], [id number]   [phone]            Discharge Medication List as of 7/17/2024  1:25 PM        START taking these medications    Details   !! albuterol (ProAir HFA) 90 mcg/act inhaler Inhale 2 puffs every 4 (four) hours as needed for wheezing or shortness of breath, Starting Wed 7/17/2024, Normal      azelastine (ASTELIN) 0.1 % nasal spray 1 spray into each nostril 2 (two) times a day Use in each nostril as directed, Starting Wed 7/17/2024, Normal      predniSONE 20 mg tablet Take 2 tablets (40 mg total) by mouth daily for 5 days, Starting Wed 7/17/2024, Until Mon 7/22/2024, Normal       !! - Potential duplicate medications found. Please discuss with provider.        CONTINUE these medications which have NOT CHANGED    Details   !! albuterol (Proventil HFA) 90 mcg/act inhaler Inhale 2 puffs every 4 (four) hours as needed for wheezing, Starting Thu 7/11/2024, Normal      benzonatate (TESSALON PERLES) 100 mg capsule Take 1 capsule (100 mg total) by  mouth 3 (three) times a day as needed for cough, Starting Wed 7/10/2024, Normal      dextromethorphan-guaiFENesin (ROBITUSSIN DM)  mg/5 mL syrup Take 5 mL by mouth 3 (three) times a day as needed for cough for up to 7 days, Starting Thu 7/11/2024, Until u 7/18/2024 at 2359, Normal      norethindrone-ethinyl estradiol (Junel FE 1/20) 1-20 MG-MCG per tablet Take 1 tablet by mouth daily, Starting Wed 9/20/2023, Normal      triamcinolone (KENALOG) 0.1 % ointment Apply topically 2 (two) times a day, Starting Wed 8/23/2023, Normal       !! - Potential duplicate medications found. Please discuss with provider.        No discharge procedures on file.    PDMP Review         Value Time User    PDMP Reviewed  Yes 11/3/2022  3:53 PM Seb Babb PA-C             ED Provider  Attending physically available and evaluated Socorro Wen. I managed the patient along with the ED Attending.    Electronically Signed by           Crystal Heath DO  07/19/24 0909

## 2024-07-17 NOTE — ED ATTENDING ATTESTATION
7/17/2024  I, Prerna Virgen MD, saw and evaluated the patient. I have discussed the patient with the resident/non-physician practitioner and agree with the resident's/non-physician practitioner's findings, Plan of Care, and MDM as documented in the resident's/non-physician practitioner's note, except where noted. All available labs and Radiology studies were reviewed.  I was present for key portions of any procedure(s) performed by the resident/non-physician practitioner and I was immediately available to provide assistance.       At this point I agree with the current assessment done in the Emergency Department.  I have conducted an independent evaluation of this patient a history and physical is as follows:  23-year-old woman with no significant history of underlying pulmonary disease, started with a viral syndrome about a week ago, cough, congestion, 4 days of fever.  Fever has since resolved, patient has ongoing cough.  States that she has posttussive emesis.  Shortness of breath, every time she takes a deep breath she has coughing.  No further fevers.  No lateralizing limb swelling.  No PE risk factors.  Review of systems otherwise -12 systems reviewed.  On exam vital signs were reviewed.  Patient is awake, alert, interactive.  The patient's pupils are equally round reactive to light.  Oropharynx is clear with moist mucous membranes.  Neck is supple and nontender with no adenopathy or JVD.  Heart is regular with no murmurs, rubs, or gallops.  Lungs are clear and equal with no wheezes, rales, or rhonchi.  Abdomen is soft and nontender with no masses, rebound, or guarding. There is no CVA tenderness.  The patient was completely exposed.  There is no skin breakdown.  There are no rashes or skin changes.  Extremities are warm and well perfused with good pulses. The patient has normal strength, sensation, and cranial nerves.MEDICAL DECISION MAKING    Number and Complexity of Problems  Differential diagnosis:  Bronchoconstriction related to viral illness, doubt asthma, doubt pneumonia, doubt pneumothorax, doubt cardiac, doubt pertussis as she is immunized    Medical Decision Making Data  External documents reviewed:   My EKG interpretation:   My CT interpretation:   My X-ray interpretation: No focal infiltrate, nonspecific interstitial  My ultrasound interpretation:     XR chest 2 views    (Results Pending)       Labs Reviewed   POCT PREGNANCY, URINE - Normal       Result Value Ref Range Status    EXT Preg Test, Ur Negative   Final    Control Valid   Final       Labs reviewed by me are significant for:     Clinical decision rules/scores are significant for:     Discussed case with:   Considered admission for:     Treatment and Disposition  ED course: Seen and examined.  Will give bronchodilators, reassess  Shared decision making:   Code status:     ED Course         Critical Care Time  Procedures

## 2024-07-17 NOTE — Clinical Note
Socorro Meyersniyashannon was seen and treated in our emergency department on 7/17/2024.                Diagnosis: trouble breathing    Socorro  .    She may return on this date: 07/18/2024         If you have any questions or concerns, please don't hesitate to call.      Crystal Heath, DO    ______________________________           _______________          _______________  Hospital Representative                              Date                                Time

## 2024-07-17 NOTE — DISCHARGE INSTRUCTIONS
Use albuterol as needed  Prednisone for 5 days  Follow-up with your primary care outpatient  If you have worsening symptoms, worsening trouble breathing and chest tightness, come back to the ED  You can use honey and over-the-counter Astelin for symptomatic relief

## 2024-07-17 NOTE — ED NOTES
Pt placed in exam gown and blanket provided  Family at bedside     Emperatriz Andrews RN  07/17/24 6125

## 2024-08-09 ENCOUNTER — APPOINTMENT (OUTPATIENT)
Dept: LAB | Facility: CLINIC | Age: 24
End: 2024-08-09
Payer: COMMERCIAL

## 2024-08-09 ENCOUNTER — NURSE TRIAGE (OUTPATIENT)
Age: 24
End: 2024-08-09

## 2024-08-09 DIAGNOSIS — N30.90 CYSTITIS: Primary | ICD-10-CM

## 2024-08-09 DIAGNOSIS — R30.9 PAINFUL URINATION: ICD-10-CM

## 2024-08-09 DIAGNOSIS — R30.9 PAINFUL URINATION: Primary | ICD-10-CM

## 2024-08-09 LAB
BACTERIA UR QL AUTO: ABNORMAL /HPF
BILIRUB UR QL STRIP: NEGATIVE
CLARITY UR: ABNORMAL
COLOR UR: ABNORMAL
GLUCOSE UR STRIP-MCNC: NEGATIVE MG/DL
HGB UR QL STRIP.AUTO: ABNORMAL
KETONES UR STRIP-MCNC: NEGATIVE MG/DL
LEUKOCYTE ESTERASE UR QL STRIP: ABNORMAL
MUCOUS THREADS UR QL AUTO: ABNORMAL
NITRITE UR QL STRIP: NEGATIVE
NON-SQ EPI CELLS URNS QL MICRO: ABNORMAL /HPF
PH UR STRIP.AUTO: 6 [PH]
PROT UR STRIP-MCNC: ABNORMAL MG/DL
RBC #/AREA URNS AUTO: ABNORMAL /HPF
SP GR UR STRIP.AUTO: 1.01 (ref 1–1.03)
UROBILINOGEN UR STRIP-ACNC: <2 MG/DL
WBC #/AREA URNS AUTO: ABNORMAL /HPF
WBC CLUMPS # UR AUTO: PRESENT /UL

## 2024-08-09 PROCEDURE — 87186 SC STD MICRODIL/AGAR DIL: CPT

## 2024-08-09 PROCEDURE — 81001 URINALYSIS AUTO W/SCOPE: CPT

## 2024-08-09 PROCEDURE — 87077 CULTURE AEROBIC IDENTIFY: CPT

## 2024-08-09 PROCEDURE — 87086 URINE CULTURE/COLONY COUNT: CPT

## 2024-08-09 RX ORDER — NITROFURANTOIN 25; 75 MG/1; MG/1
100 CAPSULE ORAL 2 TIMES DAILY
Qty: 14 CAPSULE | Refills: 0 | Status: SHIPPED | OUTPATIENT
Start: 2024-08-09 | End: 2024-08-12 | Stop reason: SDUPTHER

## 2024-08-09 NOTE — TELEPHONE ENCOUNTER
Macrobid sent to pharmacy on file to start AFTER culture obtained. May need to alter based on culture results.

## 2024-08-09 NOTE — TELEPHONE ENCOUNTER
"Socorro reporting burning and frequency with urination x 2-3 days.  Denies fever, flank or pelvic pain.    Per protocol : UA and c/s ordered.  Socorro will provide ua and culture- aware treatment typically pending culture result.   Increase fluids/cranberry juice. AZO otc for discomfort.   Confirmed allergies and pharmacy in chart.     If pain os symptoms increase over weekend, please be evaluated at urgent care.   Patient in agreeement to plan    Forwarded to Dr Nolan( currently out of contact)  Forwarded to Dr Milan- covering provider      Reason for Disposition   Painful urination AND EITHER frequency or urgency    Answer Assessment - Initial Assessment Questions  1. SEVERITY: \"How bad is the pain?\"  (e.g., Scale 1-10; mild, moderate, or severe)    - MILD (1-3): complains slightly about urination hurting    - MODERATE (4-7): interferes with normal activities      - SEVERE (8-10): excruciating, unwilling or unable to urinate because of the pain       Pain level 7 when urinating- has discomfort in between  2. FREQUENCY: \"How many times have you had painful urination today?\"       4-5  3. PATTERN: \"Is pain present every time you urinate or just sometimes?\"       Yes every time   4. ONSET: \"When did the painful urination start?\"       Tuesday, Wednesday  5. FEVER: \"Do you have a fever?\" If Yes, ask: \"What is your temperature, how was it measured, and when did it start?\"      denies  6. PAST UTI: \"Have you had a urine infection before?\" If Yes, ask: \"When was the last time?\" and \"What happened that time?\"       Yes-over a year ago  7. CAUSE: \"What do you think is causing the painful urination?\"  (e.g., UTI, scratch, Herpes sore)      UTI  8. OTHER SYMPTOMS: \"Do you have any other symptoms?\" (e.g., flank pain, vaginal discharge, genital sores, urgency, blood in urine)      Cloudy urine-intermittent odor when urinating  9. PREGNANCY: \"Is there any chance you are pregnant?\" \"When was your last menstrual period?\"      LMP " 7/17/2024    Protocols used: Urination Pain - Female-ADULT-[place]

## 2024-08-11 LAB
BACTERIA UR CULT: ABNORMAL
BACTERIA UR CULT: ABNORMAL

## 2024-08-12 ENCOUNTER — PATIENT MESSAGE (OUTPATIENT)
Dept: OBGYN CLINIC | Facility: CLINIC | Age: 24
End: 2024-08-12

## 2024-08-12 DIAGNOSIS — N30.90 CYSTITIS: ICD-10-CM

## 2024-08-12 LAB
BACTERIA UR CULT: ABNORMAL
BACTERIA UR CULT: ABNORMAL

## 2024-08-12 RX ORDER — NITROFURANTOIN 25; 75 MG/1; MG/1
100 CAPSULE ORAL 2 TIMES DAILY
Qty: 14 CAPSULE | Refills: 0 | Status: SHIPPED | OUTPATIENT
Start: 2024-08-12 | End: 2024-08-19

## 2024-08-16 ENCOUNTER — TELEPHONE (OUTPATIENT)
Age: 24
End: 2024-08-16

## 2024-08-16 NOTE — TELEPHONE ENCOUNTER
Patient has dropped off fmla forms to be signed, scanned into media for review. Patient is aware of form fee charge. Last seen 05/06/2024, please advise if patient needs an appointment for forms to be completed.

## 2024-08-20 NOTE — TELEPHONE ENCOUNTER
Patient called back and can take virtual appointment prior to 2:45. Virtual visit was not available. Please call and schedule.  Tried both clerical and clinical but you guys were busy with the patients.  Thank you!!

## 2024-08-21 ENCOUNTER — TELEPHONE (OUTPATIENT)
Dept: INTERNAL MEDICINE CLINIC | Facility: OTHER | Age: 24
End: 2024-08-21

## 2024-08-21 ENCOUNTER — TELEMEDICINE (OUTPATIENT)
Dept: INTERNAL MEDICINE CLINIC | Facility: OTHER | Age: 24
End: 2024-08-21
Payer: COMMERCIAL

## 2024-08-21 VITALS — BODY MASS INDEX: 28.17 KG/M2 | HEIGHT: 64 IN | WEIGHT: 165 LBS

## 2024-08-21 DIAGNOSIS — M54.50 BILATERAL LOW BACK PAIN WITHOUT SCIATICA, UNSPECIFIED CHRONICITY: Primary | ICD-10-CM

## 2024-08-21 PROCEDURE — 99214 OFFICE O/P EST MOD 30 MIN: CPT | Performed by: NURSE PRACTITIONER

## 2024-08-21 RX ORDER — METHYLPREDNISOLONE 4 MG
TABLET, DOSE PACK ORAL
Qty: 21 EACH | Refills: 0 | Status: SHIPPED | OUTPATIENT
Start: 2024-08-21

## 2024-08-21 RX ORDER — CYCLOBENZAPRINE HCL 5 MG
5 TABLET ORAL
Qty: 15 TABLET | Refills: 0 | Status: SHIPPED | OUTPATIENT
Start: 2024-08-21

## 2024-08-21 NOTE — TELEPHONE ENCOUNTER
LMOM for patient to call and schedule a 3 month follow up from her virtual visit today with Lolly Almonte.

## 2024-08-21 NOTE — PROGRESS NOTES
Virtual Regular Visit  Name: Socorro Wen      : 2000      MRN: [id number]  Encounter Provider: PARAG George  Encounter Date: 2024   Encounter department: [facility] [place]    Verification of patient location:    Patient is located at Home in the following state in which I hold an active license [place]    Assessment & Plan   1. Bilateral low back pain without sciatica, unspecified chronicity  Assessment & Plan:  -May use heat or ice for discomfort  -Encouraged gentle stretching  -Will start Medrol Dosepak and Flexeril  -Referral to PT and chiropractic medicine  -Will get x-ray of lumbar spine  -FMLA paperwork filled out follow-up in 3 months  Orders:  -     Ambulatory Referral to Physical Therapy; Future  -     XR spine lumbar 2 or 3 views injury; Future; Expected date: 2024  -     cyclobenzaprine (FLEXERIL) 5 mg tablet; Take 1 tablet (5 mg total) by mouth daily at bedtime  -     methylPREDNISolone 4 MG tablet therapy pack; Use as directed on package  -     Ambulatory Referral to Chiropractic; Future         Encounter provider PARAG George    The patient was identified by name and date of birth. Socorro Wen was informed that this is a telemedicine visit and that the visit is being conducted through the Epic Embedded platform. She agrees to proceed..  My office door was closed. No one else was in the room.  She acknowledged consent and understanding of privacy and security of the video platform. The patient has agreed to participate and understands they can discontinue the visit at any time.    Patient is aware this is a billable service.     History of Present Illness     Patient calls in today requesting FMLA paperwork to be filled out.     Low back pain- for the past two months she has been having lower back pain, denies injury, denies radiation of pain down her legs, She reports that she has been taking tylenol and ibuprofen with mild  "relief of pain   She reports that she sits a lot at work and she has been very uncomfortable, she reports that at times it is very uncomfortable to get out of bed        Review of Systems   Constitutional:  Negative for activity change, appetite change, chills, diaphoresis and fever.   HENT:  Negative for congestion, ear discharge, ear pain, postnasal drip, rhinorrhea, sinus pressure, sinus pain and sore throat.    Eyes:  Negative for pain, discharge, itching and visual disturbance.   Respiratory:  Negative for cough, chest tightness, shortness of breath and wheezing.    Cardiovascular:  Negative for chest pain, palpitations and leg swelling.   Gastrointestinal:  Negative for abdominal pain, constipation, diarrhea, nausea and vomiting.   Endocrine: Negative for polydipsia, polyphagia and polyuria.   Genitourinary:  Negative for difficulty urinating, dysuria and urgency.   Musculoskeletal:  Positive for back pain. Negative for arthralgias and neck pain.   Skin:  Negative for rash and wound.   Neurological:  Negative for dizziness, weakness, numbness and headaches.       Objective     Ht 5' 4\" (1.626 m)   Wt 74.8 kg (165 lb)   BMI 28.32 kg/m²   Physical Exam  Constitutional:       Appearance: Normal appearance. She is not ill-appearing.   Eyes:      General: No scleral icterus.  Pulmonary:      Effort: No respiratory distress.   Neurological:      Mental Status: She is alert.   Psychiatric:         Mood and Affect: Mood normal.         Behavior: Behavior normal.         Visit Time  Total Visit Duration: 25        "

## 2024-08-21 NOTE — TELEPHONE ENCOUNTER
Forms have been completed and scanned under this encounter. Patient needs to pay form fee which has been added to chart

## 2024-08-21 NOTE — ASSESSMENT & PLAN NOTE
-May use heat or ice for discomfort  -Encouraged gentle stretching  -Will start Medrol Dosepak and Flexeril  -Referral to PT and chiropractic medicine  -Will get x-ray of lumbar spine  -[facility] paperwork filled out follow-up in 3 months

## 2024-09-30 DIAGNOSIS — M54.50 BILATERAL LOW BACK PAIN WITHOUT SCIATICA, UNSPECIFIED CHRONICITY: ICD-10-CM

## 2024-09-30 DIAGNOSIS — Z30.41 ENCOUNTER FOR SURVEILLANCE OF CONTRACEPTIVE PILLS: ICD-10-CM

## 2024-09-30 RX ORDER — NORETHINDRONE ACETATE AND ETHINYL ESTRADIOL 1MG-20(21)
1 KIT ORAL DAILY
Qty: 84 TABLET | Refills: 4 | Status: SHIPPED | OUTPATIENT
Start: 2024-09-30 | End: 2024-10-03 | Stop reason: SDUPTHER

## 2024-09-30 NOTE — TELEPHONE ENCOUNTER
Medication: Junel FE 1-20 mg-mcg    Dose/Frequency: 1 tablet po daily     Quantity: 84 tablet    Pharmacy: [facility]    Office:   [] PCP/Provider -   [x] Speciality/Provider - Rosemary Nolan    Does the patient have enough for 3 days?   [] Yes   [x] No - Send as HP to POD

## 2024-10-01 RX ORDER — METHYLPREDNISOLONE 4 MG
TABLET, DOSE PACK ORAL
Qty: 21 EACH | Refills: 0 | OUTPATIENT
Start: 2024-10-01

## 2024-10-03 ENCOUNTER — ANNUAL EXAM (OUTPATIENT)
Dept: OBGYN CLINIC | Facility: CLINIC | Age: 24
End: 2024-10-03
Payer: COMMERCIAL

## 2024-10-03 VITALS
SYSTOLIC BLOOD PRESSURE: 114 MMHG | BODY MASS INDEX: 27.31 KG/M2 | WEIGHT: 160 LBS | DIASTOLIC BLOOD PRESSURE: 70 MMHG | HEIGHT: 64 IN

## 2024-10-03 DIAGNOSIS — Z30.41 ENCOUNTER FOR SURVEILLANCE OF CONTRACEPTIVE PILLS: ICD-10-CM

## 2024-10-03 DIAGNOSIS — Z12.39 ENCOUNTER FOR SCREENING BREAST EXAMINATION: ICD-10-CM

## 2024-10-03 DIAGNOSIS — Z01.419 ENCOUNTER FOR WELL WOMAN EXAM: Primary | ICD-10-CM

## 2024-10-03 PROCEDURE — S0612 ANNUAL GYNECOLOGICAL EXAMINA: HCPCS | Performed by: PHYSICIAN ASSISTANT

## 2024-10-03 RX ORDER — NORETHINDRONE ACETATE AND ETHINYL ESTRADIOL 1MG-20(21)
1 KIT ORAL DAILY
Qty: 84 TABLET | Refills: 4 | Status: SHIPPED | OUTPATIENT
Start: 2024-10-03

## 2024-10-03 NOTE — PROGRESS NOTES
Assessment/Plan:      Diagnoses and all orders for this visit:    Encounter for well woman exam    Encounter for screening breast examination    Encounter for surveillance of contraceptive pills  -     norethindrone-ethinyl estradiol (Junel FE 1/20) 1-20 MG-MCG per tablet; Take 1 tablet by mouth daily          Subjective:     Patient ID: Socorro Wen is a 24 y.o. female.    Pt presents for her annual exam today--  She has no complaints  She has regular bleeding  no pelvic pain  On OCP  Bowel and bladder are regular  No breast concerns today    No pap today.    Rx junel 1/20  Daily mvi        Review of Systems   Constitutional:  Negative for chills, fever and unexpected weight change.   HENT:  Negative for ear pain and sore throat.    Eyes:  Negative for pain and visual disturbance.   Respiratory:  Negative for cough and shortness of breath.    Cardiovascular:  Negative for chest pain and palpitations.   Gastrointestinal:  Negative for abdominal pain, blood in stool, constipation, diarrhea and vomiting.   Genitourinary: Negative.  Negative for dysuria and hematuria.   Musculoskeletal:  Negative for arthralgias and back pain.   Skin:  Negative for color change and rash.   Neurological:  Negative for seizures and syncope.   All other systems reviewed and are negative.        Objective:     Physical Exam  Vitals and nursing note reviewed.   Constitutional:       Appearance: Normal appearance. She is well-developed.   HENT:      Head: Normocephalic and atraumatic.   Chest:   Breasts:     Right: No inverted nipple, mass, nipple discharge or skin change.      Left: No inverted nipple, mass, nipple discharge or skin change.   Abdominal:      Palpations: Abdomen is soft.   Genitourinary:     General: Normal vulva.      Exam position: Supine.      Labia:         Right: No rash, tenderness or lesion.         Left: No rash, tenderness or lesion.       Vagina: Normal.      Cervix: No cervical motion tenderness, discharge or  friability.      Uterus: Normal.       Adnexa: Right adnexa normal and left adnexa normal.        Right: No mass, tenderness or fullness.          Left: No mass, tenderness or fullness.     Musculoskeletal:      Cervical back: Normal range of motion.   Lymphadenopathy:      Lower Body: No right inguinal adenopathy. No left inguinal adenopathy.   Neurological:      Mental Status: She is alert.

## 2025-01-03 ENCOUNTER — OFFICE VISIT (OUTPATIENT)
Dept: DERMATOLOGY | Facility: CLINIC | Age: 25
End: 2025-01-03
Payer: COMMERCIAL

## 2025-01-03 VITALS — BODY MASS INDEX: 28.41 KG/M2 | WEIGHT: 165.5 LBS | TEMPERATURE: 97.3 F

## 2025-01-03 DIAGNOSIS — D22.5 MULTIPLE BENIGN MELANOCYTIC NEVI OF BOTH UPPER EXTREMITIES, BOTH LOWER EXTREMITIES, AND TRUNK: ICD-10-CM

## 2025-01-03 DIAGNOSIS — D22.71 MULTIPLE BENIGN MELANOCYTIC NEVI OF BOTH UPPER EXTREMITIES, BOTH LOWER EXTREMITIES, AND TRUNK: ICD-10-CM

## 2025-01-03 DIAGNOSIS — D22.62 MULTIPLE BENIGN MELANOCYTIC NEVI OF BOTH UPPER EXTREMITIES, BOTH LOWER EXTREMITIES, AND TRUNK: ICD-10-CM

## 2025-01-03 DIAGNOSIS — D22.61 MULTIPLE BENIGN MELANOCYTIC NEVI OF BOTH UPPER EXTREMITIES, BOTH LOWER EXTREMITIES, AND TRUNK: ICD-10-CM

## 2025-01-03 DIAGNOSIS — Z86.018 HISTORY OF DYSPLASTIC NEVUS: Primary | ICD-10-CM

## 2025-01-03 DIAGNOSIS — D22.72 MULTIPLE BENIGN MELANOCYTIC NEVI OF BOTH UPPER EXTREMITIES, BOTH LOWER EXTREMITIES, AND TRUNK: ICD-10-CM

## 2025-01-03 DIAGNOSIS — L81.4 SOLAR LENTIGO: ICD-10-CM

## 2025-01-03 PROCEDURE — 99213 OFFICE O/P EST LOW 20 MIN: CPT

## 2025-01-03 NOTE — PATIENT INSTRUCTIONS
What is skin cancer?  Skin cancer is unfortunately very common. That's why we are here to help you on your journey to healthy happy skin! There are two main types of skin cancer: melanoma and non-melanoma skin cancer. Melanoma is a form of skin cancer that often arises within an existing nevus or mole. However, this is not always the case. Melanoma can arise anywhere (not only where you have moles right now). Melanoma can run in families, so letting us know about your family history is important. Non-melanoma skin cancer is the most common type of cancer in the [place]. The two main types of non-melanoma skin cancers are basal cell carcinomas (BCC) and squamous cell carcinoma (SCC). These cancers tend to be less aggressive than melanomas but are still important to look for and treat.    What can I do to prevent skin cancer?  One of the largest risk factors for skin cancer is sun exposure or UV radiation. Therefore, sun protection is ornelas! Here are some great tips for protecting yourself!  Try to avoid direct sun exposure during peak sun hours (10 AM to 2 PM)  Remember you get A LOT of sun even under cloud coverage and through care windows!  When choosing a sunscreen, look for one that says “broad spectrum” sunscreen. This means it protects you from more of the harmful UV rays.   Choose a sunscreen that is SPF 30 or greater for best protection.   Apply sunscreen to all sun-exposed skin and reapply every 2 hours.   Consider sun protective clothing! Great additions to your sun protective clothing wardrobe include broad brimmed hats, sunglasses, UPF clothing.  Avoid tanning salons. These have been shown to be very harmful in terms of your risk of skin cancer.   Avoid “base tans”. We now know that tans are dangerous (not just sun burns). If you want to have a tan for a trip, consider a spray tan!    Should I check my skin at home between my dermatology appointments?  Yes! It's always a great idea to look at your  skin on a regular basis. Here are some things to look for when monitoring your skin.   For melanoma, look for the ABCDE's!  A = Asymmetry. Look for a spot where one half does not match the other!  B = Borders. Look for a spot that has jagged, ragged or irregular borders.  C = Color. Look for a spot that is not evenly colored and often includes multiple colors, especially true black, red, white, blue, grey.   D = Diameter. Look for a spot that is larger than the size of a pencil eraser.  E = Evolution. If you ever have a spot that is changing in shape, color, size or symptoms (becomes itchy, painful or starts to bleed), always call us!  For non-melanoma skin cancers, look for a new, pink spot that is not going away, especially one that is itchy, painful or bleeding.     What should I do if I see a spot that is concerning for melanoma or non-melanoma skin cancer?  If you are ever concerned, call us! Do not wait for your next appointment. We want to help!

## 2025-01-03 NOTE — PROGRESS NOTES
"[facility] Note     Patient Name: Socorro Wen  Encounter Date: 1/3/25     Have you been cared for by a [facility] Dermatologist in the last 3 years and, if so, which description applies to you?    Yes.  I have been here within the last 3 years, and my medical history has NOT changed since that time.  I am FEMALE/of child-bearing potential.    REVIEW OF SYSTEMS:  Have you recently had or currently have any of the following? No changes in my recent health.   PAST MEDICAL HISTORY:  Have you personally ever had or currently have any of the following?  If \"YES,\" then please provide more detail. No changes in my medical history.   HISTORY OF IMMUNOSUPPRESSION: Do you have a history of any of the following:  Systemic Immunosuppression such as Diabetes, Biologic or Immunotherapy, Chemotherapy, Organ Transplantation, Bone Marrow Transplantation or Prednisone?  No     Answering \"YES\" requires the addition of the dotphrase \"IMMUNOSUPPRESSED\" as the first diagnosis of the patient's visit.   FAMILY HISTORY:  Any \"first degree relatives\" (parent, brother, sister, or child) with the following?    Mother-history of melanoma    PATIENT EXPERIENCE:    Do you want the Dermatologist to perform a COMPLETE skin exam today including a clinical examination under the \"bra and underwear\" areas?  Yes  If necessary, do we have your permission to call and leave a detailed message on your Preferred Phone number that includes your specific medical information?  Yes      No Known Allergies   Current Outpatient Medications:   •  albuterol (ProAir HFA) 90 mcg/act inhaler, Inhale 2 puffs every 4 (four) hours as needed for wheezing or shortness of breath, Disp: 18 g, Rfl: 0  •  cyclobenzaprine (FLEXERIL) 5 mg tablet, Take 1 tablet (5 mg total) by mouth daily at bedtime, Disp: 15 tablet, Rfl: 0  •  norethindrone-ethinyl estradiol (Junel FE 1/20) 1-20 MG-MCG per tablet, Take 1 tablet by mouth daily, Disp: 84 tablet, Rfl: 4  •  " triamcinolone (KENALOG) 0.1 % ointment, Apply topically 2 (two) times a day, Disp: 30 g, Rfl: 3  •  albuterol (Proventil HFA) 90 mcg/act inhaler, Inhale 2 puffs every 4 (four) hours as needed for wheezing (Patient not taking: Reported on 1/3/2025), Disp: 20.1 g, Rfl: 3  •  azelastine (ASTELIN) 0.1 % nasal spray, 1 spray into each nostril 2 (two) times a day Use in each nostril as directed (Patient not taking: Reported on 1/3/2025), Disp: 1 mL, Rfl: 0  •  benzonatate (TESSALON PERLES) 100 mg capsule, Take 1 capsule (100 mg total) by mouth 3 (three) times a day as needed for cough (Patient not taking: Reported on 8/21/2024), Disp: 20 capsule, Rfl: 0  •  methylPREDNISolone 4 MG tablet therapy pack, Use as directed on package (Patient not taking: Reported on 1/3/2025), Disp: 21 each, Rfl: 0          Whom besides the patient is providing clinical information about today's encounter?   NO ADDITIONAL HISTORIAN (patient alone provided history)    Physical Exam and Assessment/Plan by Diagnosis:  History DYSPLASTIC NEVUS     Physical Exam:  Anatomic Location Affected:  Right breast   Morphological Description:  erythematous slightly hypertrophic scar  Pertinent Positives:  Pertinent Negatives:     Additional History of Present Condition: patient has moderate to severely atypical nevus Excised 04/17/24 w/ clear margins.     Assessment and Plan:  Based on a thorough discussion of this condition and the management approach to it (including a comprehensive discussion of the known risks, side effects and potential benefits of treatment), the patient (family) agrees to implement the following specific plan:  Discussed using SPF  Advised patient to monitor for recurrence  Discussed about getting Silicone scar patches for the scar if desired     What is a dysplastic nevus?  The term dysplastic nevus is best used for a nevus with a specific microscopic appearance. Only a minority of clinically atypical nevi fulfil microscopic criteria  for dysplastic nevus. Many histologically dysplastic nevi are clinically banale (eg, small in size, and uniform in color and in structure)     Histological dysplasia may be mild, moderate or severe. Distinct pathological criteria of a dysplastic naevus are listed here.  The dysplastic nevus may be a junctional nevus (when the melanocytes are found at the epidermodermal junction) or a compound naevus (when the melanocytes are found at the epidermodermal junction and within the dermis).  The naevus cells form a row along the dermoepidermal junction (this is reported as lentiginous proliferation), with or without naevus cells in nests (also called theques).  These theques are often irregular in size and shape and may 'bridge' or join together.  The cells may be spindle-shaped (elongated) or epithelioid (broad, resembling epidermal keratinocytes).  There may be cytological atypia (cells that are smaller or larger than usual).   There may be fibrosis or scarring in the dermis.  Inflammatory cells may infiltrate the lesion.  Associated blood vessels may be increased in number or enlarged.     Melanoma may arise within a dysplastic nevus, within a non-dysplastic, normal, melanocytic nevus, or more often, in normal-appearing skin.     Dysplastic moles are moles that fit the ABCDE rules of melanoma but are not identified as melanomas when examined under the microscope.  They may indicate an increased risk of melanoma in that person. If there is a family history of melanoma, most experts agree that the person may be at an increased risk for developing a melanoma.  Experts still do not agree on what dysplastic moles mean in patients without a personal or family history of melanoma.  Dysplastic moles are usually larger than common moles and have different colors within it with irregular borders. The appearance can be very similar to a melanoma. Biopsies of dysplastic moles may show abnormalities which are different from a  regular mole.      MELANOCYTIC NEVI  -Relevant exam: Scattered over the trunk/extremities are mostly homogenously pigmented brown macules and papules. ELM performed and without concerning findings.  - Exam and clinical history consistent with melanocytic nevi. Patient has numerous mildly atypical nevi  - Educated on the ABCDE's of melanoma; handout provided  - Counseled to return to clinic prior to scheduled appointment should any of these lesions change or should any new lesions of concern arise  - Counseled on use of sun protection daily. Reviewed latest FDA sunscreen guidelines, including use of broad spectrum (UVA and UVB blocking) sunscreen or sun protective clothing with SPF 30-50 every 2-3 hours and reapplied after exposure to water; use of photoprotective clothing, including a broad brim hat and UPF rated clothing if outdoors for several hours; avoid use of tanning beds as these pose significant risk for melanoma and skin cancer.    LENTIGINES  OTHER SKIN CHANGES DUE TO CHRONIC EXPOSURE TO NONIONIZING RADIATION  - Relevant exam: Over sun exposed areas are light brown macules. ELM performed and without concerning findings.  - Exam and clinical history consistent with lentigines.  - Educated that these are indicative of prior sun exposure.   - Counseled to return to clinic prior to scheduled appointment should any of these lesions change or should any new lesions of concern arise.  - Recommended use of sunscreen as above and below.  - Counseled on use of sun protection daily. Reviewed latest FDA sunscreen guidelines, including use of broad spectrum (UVA and UVB blocking) sunscreen or sun protective clothing with SPF 30-50 every 2-3 hours and reapplied after exposure to water; use of photoprotective clothing, including a broad brim hat and UPF rated clothing if outdoors for several hours; avoid use of tanning beds as these pose significant risk for melanoma and skin cancer.    Scribe Attestation    I,:  Unique  LOGAN Gill am acting as a scribe while in the presence of the attending physician.:       I,:  Alfreda Virk PA-C personally performed the services described in this documentation    as scribed in my presence.:

## 2025-02-19 ENCOUNTER — HOSPITAL ENCOUNTER (EMERGENCY)
Facility: HOSPITAL | Age: 25
Discharge: HOME | End: 2025-02-19
Attending: EMERGENCY MEDICINE
Payer: COMMERCIAL

## 2025-02-19 ENCOUNTER — APPOINTMENT (EMERGENCY)
Dept: CT IMAGING | Facility: HOSPITAL | Age: 25
End: 2025-02-19
Payer: COMMERCIAL

## 2025-02-19 VITALS
TEMPERATURE: 98.2 F | RESPIRATION RATE: 16 BRPM | SYSTOLIC BLOOD PRESSURE: 123 MMHG | OXYGEN SATURATION: 99 % | DIASTOLIC BLOOD PRESSURE: 76 MMHG | HEART RATE: 76 BPM

## 2025-02-19 DIAGNOSIS — N39.0 UTI (URINARY TRACT INFECTION): Primary | ICD-10-CM

## 2025-02-19 LAB
ALBUMIN SERPL BCG-MCNC: 4.3 G/DL (ref 3.5–5)
ALP SERPL-CCNC: 48 U/L (ref 34–104)
ALT SERPL W P-5'-P-CCNC: 8 U/L (ref 7–52)
ANION GAP SERPL CALCULATED.3IONS-SCNC: 9 MMOL/L (ref 4–13)
AST SERPL W P-5'-P-CCNC: 12 U/L (ref 13–39)
BACTERIA UR QL AUTO: ABNORMAL /HPF
BASOPHILS # BLD AUTO: 0.04 THOUSANDS/ΜL (ref 0–0.1)
BASOPHILS NFR BLD AUTO: 1 % (ref 0–1)
BILIRUB SERPL-MCNC: 0.4 MG/DL (ref 0.2–1)
BILIRUB UR QL STRIP: NEGATIVE
BUN SERPL-MCNC: 10 MG/DL (ref 5–25)
CALCIUM SERPL-MCNC: 9.3 MG/DL (ref 8.4–10.2)
CHLORIDE SERPL-SCNC: 103 MMOL/L (ref 96–108)
CLARITY UR: ABNORMAL
CO2 SERPL-SCNC: 24 MMOL/L (ref 21–32)
COLOR UR: YELLOW
CREAT SERPL-MCNC: 0.88 MG/DL (ref 0.6–1.3)
EOSINOPHIL # BLD AUTO: 0.07 THOUSAND/ΜL (ref 0–0.61)
EOSINOPHIL NFR BLD AUTO: 1 % (ref 0–6)
ERYTHROCYTE [DISTWIDTH] IN BLOOD BY AUTOMATED COUNT: 11.6 % (ref 11.6–15.1)
EXT PREGNANCY TEST URINE: NEGATIVE
EXT. CONTROL: NORMAL
GFR SERPL CREATININE-BSD FRML MDRD: 91 ML/MIN/1.73SQ M
GLUCOSE SERPL-MCNC: 105 MG/DL (ref 65–140)
GLUCOSE UR STRIP-MCNC: NEGATIVE MG/DL
HCT VFR BLD AUTO: 40.9 % (ref 34.8–46.1)
HGB BLD-MCNC: 13.6 G/DL (ref 11.5–15.4)
HGB UR QL STRIP.AUTO: ABNORMAL
IMM GRANULOCYTES # BLD AUTO: 0.01 THOUSAND/UL (ref 0–0.2)
IMM GRANULOCYTES NFR BLD AUTO: 0 % (ref 0–2)
KETONES UR STRIP-MCNC: NEGATIVE MG/DL
LEUKOCYTE ESTERASE UR QL STRIP: ABNORMAL
LIPASE SERPL-CCNC: 25 U/L (ref 11–82)
LYMPHOCYTES # BLD AUTO: 1.91 THOUSANDS/ΜL (ref 0.6–4.47)
LYMPHOCYTES NFR BLD AUTO: 27 % (ref 14–44)
MCH RBC QN AUTO: 31.7 PG (ref 26.8–34.3)
MCHC RBC AUTO-ENTMCNC: 33.3 G/DL (ref 31.4–37.4)
MCV RBC AUTO: 95 FL (ref 82–98)
MONOCYTES # BLD AUTO: 0.31 THOUSAND/ΜL (ref 0.17–1.22)
MONOCYTES NFR BLD AUTO: 4 % (ref 4–12)
NEUTROPHILS # BLD AUTO: 4.81 THOUSANDS/ΜL (ref 1.85–7.62)
NEUTS SEG NFR BLD AUTO: 67 % (ref 43–75)
NITRITE UR QL STRIP: NEGATIVE
NON-SQ EPI CELLS URNS QL MICRO: ABNORMAL /HPF
NRBC BLD AUTO-RTO: 0 /100 WBCS
PH UR STRIP.AUTO: 6 [PH]
PLATELET # BLD AUTO: 291 THOUSANDS/UL (ref 149–390)
PMV BLD AUTO: 8.9 FL (ref 8.9–12.7)
POTASSIUM SERPL-SCNC: 3.8 MMOL/L (ref 3.5–5.3)
PROT SERPL-MCNC: 7.1 G/DL (ref 6.4–8.4)
PROT UR STRIP-MCNC: NEGATIVE MG/DL
RBC # BLD AUTO: 4.29 MILLION/UL (ref 3.81–5.12)
RBC #/AREA URNS AUTO: ABNORMAL /HPF
SODIUM SERPL-SCNC: 136 MMOL/L (ref 135–147)
SP GR UR STRIP.AUTO: 1.02 (ref 1–1.03)
UROBILINOGEN UR QL STRIP.AUTO: 0.2 E.U./DL
WBC # BLD AUTO: 7.15 THOUSAND/UL (ref 4.31–10.16)
WBC #/AREA URNS AUTO: ABNORMAL /HPF

## 2025-02-19 PROCEDURE — 81001 URINALYSIS AUTO W/SCOPE: CPT | Performed by: EMERGENCY MEDICINE

## 2025-02-19 PROCEDURE — 85025 COMPLETE CBC W/AUTO DIFF WBC: CPT | Performed by: EMERGENCY MEDICINE

## 2025-02-19 PROCEDURE — 36415 COLL VENOUS BLD VENIPUNCTURE: CPT | Performed by: EMERGENCY MEDICINE

## 2025-02-19 PROCEDURE — 83690 ASSAY OF LIPASE: CPT | Performed by: EMERGENCY MEDICINE

## 2025-02-19 PROCEDURE — 96361 HYDRATE IV INFUSION ADD-ON: CPT

## 2025-02-19 PROCEDURE — 81025 URINE PREGNANCY TEST: CPT | Performed by: EMERGENCY MEDICINE

## 2025-02-19 PROCEDURE — 80053 COMPREHEN METABOLIC PANEL: CPT | Performed by: EMERGENCY MEDICINE

## 2025-02-19 PROCEDURE — 96374 THER/PROPH/DIAG INJ IV PUSH: CPT

## 2025-02-19 PROCEDURE — 99285 EMERGENCY DEPT VISIT HI MDM: CPT | Performed by: EMERGENCY MEDICINE

## 2025-02-19 PROCEDURE — 87086 URINE CULTURE/COLONY COUNT: CPT | Performed by: EMERGENCY MEDICINE

## 2025-02-19 PROCEDURE — 74177 CT ABD & PELVIS W/CONTRAST: CPT

## 2025-02-19 PROCEDURE — 99284 EMERGENCY DEPT VISIT MOD MDM: CPT

## 2025-02-19 RX ORDER — CEPHALEXIN 500 MG/1
500 CAPSULE ORAL EVERY 6 HOURS SCHEDULED
Qty: 28 CAPSULE | Refills: 0 | Status: SHIPPED | OUTPATIENT
Start: 2025-02-19 | End: 2025-02-26

## 2025-02-19 RX ORDER — KETOROLAC TROMETHAMINE 30 MG/ML
15 INJECTION, SOLUTION INTRAMUSCULAR; INTRAVENOUS ONCE
Status: COMPLETED | OUTPATIENT
Start: 2025-02-19 | End: 2025-02-19

## 2025-02-19 RX ADMIN — IOHEXOL 100 ML: 350 INJECTION, SOLUTION INTRAVENOUS at 12:17

## 2025-02-19 RX ADMIN — SODIUM CHLORIDE 1000 ML: 0.9 INJECTION, SOLUTION INTRAVENOUS at 11:24

## 2025-02-19 RX ADMIN — CEPHALEXIN 500 MG: 250 CAPSULE ORAL at 14:42

## 2025-02-19 RX ADMIN — KETOROLAC TROMETHAMINE 15 MG: 30 INJECTION, SOLUTION INTRAMUSCULAR at 11:25

## 2025-02-19 NOTE — Clinical Note
Socorro Wen was seen and treated in our emergency department on 2/19/2025.    No restrictions            Diagnosis: UTI    Socorro  may return to work on return date.    She may return on this date: 02/20/2025         If you have any questions or concerns, please don't hesitate to call.      Eliseo Alicea MD    ______________________________           _______________          _______________  Hospital Representative                              Date                                Time

## 2025-02-19 NOTE — ED PROVIDER NOTES
Time reflects when diagnosis was documented in both MDM as applicable and the Disposition within this note       Time User Action Codes Description Comment    2/19/2025  2:37 PM Eliseo Alicea [N39.0] UTI (urinary tract infection)           ED Disposition       ED Disposition   Discharge    Condition   Stable    Date/Time   Wed Feb 19, 2025  2:17 PM    Comment   Socorronaman Wen discharge to home/self care.                   Assessment & Plan       Medical Decision Making  24-year-old female presented to the emergency department for evaluation of flank pain.  On arrival patient awake, alert and in no acute distress.  Initial vital signs within normal limits.  On exam patient with left-sided abdominal and flank tenderness.  CT scan was ordered to evaluate for acute pathology including but not limited to obstruction, perforation, infection, abscess, obstruction.  CT scan with no acute findings.  Blood work grossly unremarkable.  Blood work with 2+ leukocytes with bacteria, epithelial cells and white blood cells noted on microscopic.  Urine culture pending.  Patient treated symptomatically with improvement of symptoms.  All diagnostic studies were discussed with the patient in detail.  Patient stated that she preferred to be treated for a presumptive UTI pending results of the urine culture.  Patient treated with a dose of Keflex and a prescription was sent to her pharmacy.  Recommendation was made for the patient to follow-up with her PCP.  Return precautions were discussed.    The patient (and/or family present) agrees with the plan for discharge and feels comfortable to go home with proper follow-up. Diagnostic tests were reviewed. Diagnosis, care plan and treatment options were discussed. All questions were answered prior to discharge. I considered the patient's other medical conditions as applicable/noted above in my medical decision making. Advised the patient to return for worsening or additional problems.  The patient (and/or family present) verbalized understanding of the discharge instructions and warnings that would necessitate return to the Emergency Department.          Amount and/or Complexity of Data Reviewed  Labs: ordered.  Radiology: ordered. Decision-making details documented in ED Course.    Risk  Prescription drug management.        ED Course as of 25   1416 CT abdomen pelvis with contrast  IMPRESSION:     No acute findings.         Medications   sodium chloride 0.9 % bolus 1,000 mL (0 mL Intravenous Stopped 25 1254)   ketorolac (TORADOL) injection 15 mg (15 mg Intravenous Given 25 1125)   iohexol (OMNIPAQUE) 350 MG/ML injection (SINGLE-DOSE) 100 mL (100 mL Intravenous Given 25 1217)   cephalexin (KEFLEX) capsule 500 mg (500 mg Oral Given 25 1442)       ED Risk Strat Scores                                                History of Present Illness       Chief Complaint   Patient presents with    Flank Pain     Pt reports left flank pain that radiates towards abdomen that started on Monday. Pt reports vomiting on Monday. No meds pta, denies urinary s&s        Past Medical History:   Diagnosis Date    Allergic rhinitis     Eczema     Kidney stone      (spontaneous vaginal delivery) 2022    Varicella     immunized      Past Surgical History:   Procedure Laterality Date    APPENDECTOMY LAPAROSCOPIC N/A 2022    Procedure: APPENDECTOMY LAPAROSCOPIC, possible open;  Surgeon: Lauryn Ullrich, DO;  Location:  MAIN OR;  Service: General    SKIN BIOPSY      WISDOM TOOTH EXTRACTION        Family History   Problem Relation Age of Onset    Melanoma Mother     No Known Problems Father     No Known Problems Brother     No Known Problems Brother     Mental illness Neg Hx     Substance Abuse Neg Hx       Social History     Tobacco Use    Smoking status: Former     Current packs/day: 0.00     Average packs/day: 0.3 packs/day for 3.0 years (0.8 ttl pk-yrs)      Types: Cigarettes     Start date:      Quit date:      Years since quittin.1    Smokeless tobacco: Never    Tobacco comments:     vapes   Vaping Use    Vaping status: Former    Quit date: 6/15/2021    Substances: Nicotine   Substance Use Topics    Alcohol use: Yes     Comment: 1-2 drinks on a rare occasion    Drug use: No      E-Cigarette/Vaping    E-Cigarette Use Former User     Quit Date 6/15/21       E-Cigarette/Vaping Substances    Nicotine Yes     THC No     CBD No     Flavoring No     Other No     Unknown No       I have reviewed and agree with the history as documented.     24-year-old female with history of kidney stones presents to the emergency department for evaluation of flank pain.  The patient reports acute onset left-sided flank pain starting 2 days prior to arrival.  She reports having associated episodes of nonbloody nonbilious vomiting when the pain first started but did not have any episodes yesterday.  Patient states that the pain has continued today so came to the emergency department for further evaluation.  She did not take any medications to treat her symptoms prior to arrival.  No fever, chills, diarrhea, recent travel, sick contacts or urinary symptoms.        Review of Systems   Constitutional:  Negative for chills and fever.   HENT:  Negative for ear pain and sore throat.    Eyes:  Negative for pain and visual disturbance.   Respiratory:  Negative for cough and shortness of breath.    Cardiovascular:  Negative for chest pain and palpitations.   Gastrointestinal:  Positive for nausea and vomiting. Negative for abdominal pain.   Genitourinary:  Positive for flank pain. Negative for dysuria and hematuria.   Musculoskeletal:  Negative for arthralgias and back pain.   Skin:  Negative for color change and rash.   Neurological:  Negative for seizures and syncope.   All other systems reviewed and are negative.          Objective       ED Triage Vitals   Temperature Pulse Blood  Pressure Respirations SpO2 Patient Position - Orthostatic VS   02/19/25 1104 02/19/25 1104 02/19/25 1104 02/19/25 1104 02/19/25 1104 02/19/25 1104   98.2 °F (36.8 °C) 76 123/76 16 99 % Lying      Temp Source Heart Rate Source BP Location FiO2 (%) Pain Score    02/19/25 1104 02/19/25 1104 02/19/25 1104 -- 02/19/25 1125    Oral Monitor Left arm  6      Vitals      Date and Time Temp Pulse SpO2 Resp BP Pain Score FACES Pain Rating User   02/19/25 1125 -- -- -- -- -- 6 -- RN   02/19/25 1104 98.2 °F (36.8 °C) 76 99 % 16 123/76 -- -- RN            Physical Exam  Vitals and nursing note reviewed.   Constitutional:       General: She is not in acute distress.     Appearance: She is well-developed.   HENT:      Head: Normocephalic and atraumatic.   Eyes:      Conjunctiva/sclera: Conjunctivae normal.   Cardiovascular:      Rate and Rhythm: Normal rate and regular rhythm.      Heart sounds: No murmur heard.  Pulmonary:      Effort: Pulmonary effort is normal. No respiratory distress.      Breath sounds: Normal breath sounds.   Abdominal:      Palpations: Abdomen is soft.      Tenderness: There is abdominal tenderness in the left lower quadrant. There is left CVA tenderness. There is no right CVA tenderness, guarding or rebound.   Musculoskeletal:         General: No swelling.      Cervical back: Neck supple.   Skin:     General: Skin is warm and dry.      Capillary Refill: Capillary refill takes less than 2 seconds.   Neurological:      Mental Status: She is alert.   Psychiatric:         Mood and Affect: Mood normal.         Results Reviewed       Procedure Component Value Units Date/Time    Urine Microscopic [[id number]]  (Abnormal) Collected: 02/19/25 1149    Lab Status: Final result Specimen: Urine, Clean Catch Updated: 02/19/25 1213     RBC, UA None Seen /hpf      WBC, UA 10-20 /hpf      Epithelial Cells Moderate /hpf      Bacteria, UA Occasional /hpf     Urine culture [[id number]] Collected: 02/19/25 1149    Lab Status: In  process Specimen: Urine, Clean Catch Updated: 02/19/25 1213    UA w Reflex to Microscopic w Reflex to Culture [[id number]]  (Abnormal) Collected: 02/19/25 1149    Lab Status: Final result Specimen: Urine, Clean Catch Updated: 02/19/25 1202     Color, UA Yellow     Clarity, UA Slightly Cloudy     Specific Gravity, UA 1.020     pH, UA 6.0     Leukocytes, UA 2+     Nitrite, UA Negative     Protein, UA Negative mg/dl      Glucose, UA Negative mg/dl      Ketones, UA Negative mg/dl      Urobilinogen, UA 0.2 E.U./dl      Bilirubin, UA Negative     Occult Blood, UA Trace-Intact    Comprehensive metabolic panel [[id number]]  (Abnormal) Collected: 02/19/25 1127    Lab Status: Final result Specimen: Blood from Arm, Right Updated: 02/19/25 1158     Sodium 136 mmol/L      Potassium 3.8 mmol/L      Chloride 103 mmol/L      CO2 24 mmol/L      ANION GAP 9 mmol/L      BUN 10 mg/dL      Creatinine 0.88 mg/dL      Glucose 105 mg/dL      Calcium 9.3 mg/dL      AST 12 U/L      ALT 8 U/L      Alkaline Phosphatase 48 U/L      Total Protein 7.1 g/dL      Albumin 4.3 g/dL      Total Bilirubin 0.40 mg/dL      eGFR 91 ml/min/1.73sq m     Narrative:      National Kid[employer] guidelines for Chronic Kidney Disease (CKD):     Stage 1 with normal or high GFR (GFR > 90 mL/min/1.73 square meters)    Stage 2 Mild CKD (GFR = 60-89 mL/min/1.73 square meters)    Stage 3A Moderate CKD (GFR = 45-59 mL/min/1.73 square meters)    Stage 3B Moderate CKD (GFR = 30-44 mL/min/1.73 square meters)    Stage 4 Severe CKD (GFR = 15-29 mL/min/1.73 square meters)    Stage 5 End Stage CKD (GFR <15 mL/min/1.73 square meters)  Note: GFR calculation is accurate only with a steady state creatinine    Lipase [[id number]]  (Normal) Collected: 02/19/25 1127    Lab Status: Final result Specimen: Blood from Arm, Right Updated: 02/19/25 1158     Lipase 25 u/L     POCT pregnancy, urine [[id number]]  (Normal) Collected: 02/19/25 1154    Lab Status: Final result Updated:  25 1155     EXT Preg Test, Ur Negative     Control Valid    CBC and differential [[id number]] Collected: 25 1127    Lab Status: Final result Specimen: Blood from Arm, Right Updated: 25 1134     WBC 7.15 Thousand/uL      RBC 4.29 Million/uL      Hemoglobin 13.6 g/dL      Hematocrit 40.9 %      MCV 95 fL      MCH 31.7 pg      MCHC 33.3 g/dL      RDW 11.6 %      MPV 8.9 fL      Platelets 291 Thousands/uL      nRBC 0 /100 WBCs      Segmented % 67 %      Immature Grans % 0 %      Lymphocytes % 27 %      Monocytes % 4 %      Eosinophils Relative 1 %      Basophils Relative 1 %      Absolute Neutrophils 4.81 Thousands/µL      Absolute Immature Grans 0.01 Thousand/uL      Absolute Lymphocytes 1.91 Thousands/µL      Absolute Monocytes 0.31 Thousand/µL      Eosinophils Absolute 0.07 Thousand/µL      Basophils Absolute 0.04 Thousands/µL             CT abdomen pelvis with contrast   Final Interpretation by Rudy Barnett MD ([id number])      No acute findings.         Workstation performed: [id number]             Procedures    ED Medication and Procedure Management   Prior to Admission Medications   Prescriptions Last Dose Informant Patient Reported? Taking?   albuterol (ProAir HFA) 90 mcg/act inhaler   No No   Sig: Inhale 2 puffs every 4 (four) hours as needed for wheezing or shortness of breath   albuterol (Proventil HFA) 90 mcg/act inhaler   No No   Sig: Inhale 2 puffs every 4 (four) hours as needed for wheezing   Patient not taking: Reported on 1/3/2025   azelastine (ASTELIN) 0.1 % nasal spray   No No   Si spray into each nostril 2 (two) times a day Use in each nostril as directed   Patient not taking: Reported on 1/3/2025   benzonatate (TESSALON PERLES) 100 mg capsule  Self No No   Sig: Take 1 capsule (100 mg total) by mouth 3 (three) times a day as needed for cough   Patient not taking: Reported on 2024   cyclobenzaprine (FLEXERIL) 5 mg tablet   No No   Sig: Take 1 tablet (5 mg  total) by mouth daily at bedtime   methylPREDNISolone 4 MG tablet therapy pack   No No   Sig: Use as directed on package   Patient not taking: Reported on 1/3/2025   norethindrone-ethinyl estradiol (Junel FE 1/20) 1-20 MG-MCG per tablet   No No   Sig: Take 1 tablet by mouth daily   triamcinolone (KENALOG) 0.1 % ointment  Self No No   Sig: Apply topically 2 (two) times a day      Facility-Administered Medications: None     Discharge Medication List as of 2/19/2025  2:37 PM        START taking these medications    Details   cephalexin (KEFLEX) 500 mg capsule Take 1 capsule (500 mg total) by mouth every 6 (six) hours for 7 days, Starting Wed 2/19/2025, Until Wed 2/26/2025, Normal           CONTINUE these medications which have NOT CHANGED    Details   !! albuterol (ProAir HFA) 90 mcg/act inhaler Inhale 2 puffs every 4 (four) hours as needed for wheezing or shortness of breath, Starting Wed 7/17/2024, Normal      !! albuterol (Proventil HFA) 90 mcg/act inhaler Inhale 2 puffs every 4 (four) hours as needed for wheezing, Starting Thu 7/11/2024, Normal      azelastine (ASTELIN) 0.1 % nasal spray 1 spray into each nostril 2 (two) times a day Use in each nostril as directed, Starting Wed 7/17/2024, Normal      benzonatate (TESSALON PERLES) 100 mg capsule Take 1 capsule (100 mg total) by mouth 3 (three) times a day as needed for cough, Starting Wed 7/10/2024, Normal      cyclobenzaprine (FLEXERIL) 5 mg tablet Take 1 tablet (5 mg total) by mouth daily at bedtime, Starting Wed 8/21/2024, Normal      methylPREDNISolone 4 MG tablet therapy pack Use as directed on package, Normal      norethindrone-ethinyl estradiol (Junel FE 1/20) 1-20 MG-MCG per tablet Take 1 tablet by mouth daily, Starting Thu 10/3/2024, Normal      triamcinolone (KENALOG) 0.1 % ointment Apply topically 2 (two) times a day, Starting Wed 8/23/2023, Normal       !! - Potential duplicate medications found. Please discuss with provider.        No discharge  procedures on file.  ED SEPSIS DOCUMENTATION   Time reflects when diagnosis was documented in both MDM as applicable and the Disposition within this note       Time User Action Codes Description Comment    2/19/2025  2:37 PM Eliseo Alicea Add [N39.0] UTI (urinary tract infection)                  Eliseo Alicea MD  02/19/25 2018

## 2025-02-21 LAB — BACTERIA UR CULT: NORMAL

## 2025-05-06 ENCOUNTER — OFFICE VISIT (OUTPATIENT)
Dept: OBGYN CLINIC | Facility: CLINIC | Age: 25
End: 2025-05-06
Payer: COMMERCIAL

## 2025-05-06 VITALS
SYSTOLIC BLOOD PRESSURE: 122 MMHG | BODY MASS INDEX: 27.66 KG/M2 | DIASTOLIC BLOOD PRESSURE: 76 MMHG | HEIGHT: 64 IN | WEIGHT: 162 LBS

## 2025-05-06 DIAGNOSIS — N94.6 DYSMENORRHEA: Primary | ICD-10-CM

## 2025-05-06 DIAGNOSIS — Z11.3 SCREENING EXAMINATION FOR STI: ICD-10-CM

## 2025-05-06 PROCEDURE — 87591 N.GONORRHOEAE DNA AMP PROB: CPT | Performed by: PHYSICIAN ASSISTANT

## 2025-05-06 PROCEDURE — 87491 CHLMYD TRACH DNA AMP PROBE: CPT | Performed by: PHYSICIAN ASSISTANT

## 2025-05-06 PROCEDURE — 99213 OFFICE O/P EST LOW 20 MIN: CPT | Performed by: PHYSICIAN ASSISTANT

## 2025-05-06 RX ORDER — NORETHINDRONE ACETATE AND ETHINYL ESTRADIOL, ETHINYL ESTRADIOL AND FERROUS FUMARATE 1MG-10(24)
1 KIT ORAL DAILY
Qty: 84 TABLET | Refills: 3 | Status: SHIPPED | OUTPATIENT
Start: 2025-05-06

## 2025-05-06 NOTE — PROGRESS NOTES
ASSESSMENT/PLAN:    Encounter Diagnosis     ICD-10-CM    1. Dysmenorrhea  N94.6 Norethin-Eth Estrad-Fe Biphas (Lo Loestrin Fe) 1 MG-10 MCG / 10 MCG TABS      2. Screening examination for STI  Z11.3 Chlamydia/GC amplified DNA by PCR        - Will transition to LoLo  - Urine GC collected  - Call for concerns  - RTO for annual      SUBJECTIVE/HPI:      Patient ID: Socorro Wen 2000       Socorro Wen is a 24 y.o.  presenting to the office to discuss birth control options. She has been on Junel 1/20 and her periods are very painful and lasting 7 days. She was previously on a different pill that she did much better on. She also would like STI screening.       HISTORY:    Patient Active Problem List   Diagnosis    Head injury    Intractable acute post-traumatic headache    Eczema of both upper extremities    Musculoskeletal pain of right upper extremity    Trapezius muscle spasm    Smoking    Elevated blood-pressure reading without diagnosis of hypertension    Postpartum care and examination    Obesity    Acute appendicitis    Encounter for annual physical exam    Pain in left foot    Ankle weakness    Ankle stiff, left    Bilateral low back pain without sciatica       No Known Allergies      Current Outpatient Medications:     albuterol (ProAir HFA) 90 mcg/act inhaler, Inhale 2 puffs every 4 (four) hours as needed for wheezing or shortness of breath, Disp: 18 g, Rfl: 0    cyclobenzaprine (FLEXERIL) 5 mg tablet, Take 1 tablet (5 mg total) by mouth daily at bedtime, Disp: 15 tablet, Rfl: 0    Norethin-Eth Estrad-Fe Biphas (Lo Loestrin Fe) 1 MG-10 MCG / 10 MCG TABS, Take 1 tablet by mouth in the morning, Disp: 84 tablet, Rfl: 3    triamcinolone (KENALOG) 0.1 % ointment, Apply topically 2 (two) times a day, Disp: 30 g, Rfl: 3    OB History          1    Para   1    Term   1       0    AB   0    Living   1         SAB   0    IAB   0    Ectopic   0    Multiple   0    Live Births   1            "Obstetric Comments   Menarche 11               Past Medical History:   Diagnosis Date    Allergic rhinitis     Eczema     Kidney stone      (spontaneous vaginal delivery) 2022    Varicella     immunized        Past Surgical History:   Procedure Laterality Date    APPENDECTOMY  22    APPENDECTOMY LAPAROSCOPIC N/A 2022    Procedure: APPENDECTOMY LAPAROSCOPIC, possible open;  Surgeon: Lauryn Ullrich, DO;  Location: [facility];  Service: General    SKIN BIOPSY      WISDOM TOOTH EXTRACTION          Family History   Problem Relation Age of Onset    Melanoma Mother     No Known Problems Father     No Known Problems Brother     No Known Problems Brother     Mental illness Neg Hx     Substance Abuse Neg Hx         REVIEW OF SYSTEMS:  Review of Systems     OBJECTIVE:    Visit Vitals  /76 (BP Location: Right arm, Patient Position: Sitting, Cuff Size: Standard)   Ht 5' 4\" (1.626 m)   Wt 73.5 kg (162 lb)   LMP 2025 (Exact Date)   BMI 27.81 kg/m²   OB Status Birth Control   Smoking Status Former   BSA 1.79 m²         Physical Exam  Constitutional:       Appearance: Normal appearance.   HENT:      Head: Normocephalic and atraumatic.   Pulmonary:      Effort: Pulmonary effort is normal.   Neurological:      Mental Status: She is alert.   Skin:     General: Skin is warm and dry.   Psychiatric:         Mood and Affect: Mood normal.         Behavior: Behavior normal.   Vitals reviewed.           "

## 2025-05-07 ENCOUNTER — RESULTS FOLLOW-UP (OUTPATIENT)
Dept: OBGYN CLINIC | Facility: CLINIC | Age: 25
End: 2025-05-07

## 2025-05-07 LAB
C TRACH DNA SPEC QL NAA+PROBE: NEGATIVE
N GONORRHOEA DNA SPEC QL NAA+PROBE: NEGATIVE

## 2025-06-14 ENCOUNTER — HOSPITAL ENCOUNTER (EMERGENCY)
Facility: HOSPITAL | Age: 25
Discharge: HOME | End: 2025-06-15
Attending: EMERGENCY MEDICINE | Admitting: EMERGENCY MEDICINE
Payer: COMMERCIAL

## 2025-06-14 DIAGNOSIS — A08.4 VIRAL GASTROENTERITIS: Primary | ICD-10-CM

## 2025-06-14 LAB
ALBUMIN SERPL BCG-MCNC: 4.2 G/DL (ref 3.5–5)
ALP SERPL-CCNC: 50 U/L (ref 34–104)
ALT SERPL W P-5'-P-CCNC: 9 U/L (ref 7–52)
ANION GAP SERPL CALCULATED.3IONS-SCNC: 7 MMOL/L (ref 4–13)
AST SERPL W P-5'-P-CCNC: 15 U/L (ref 13–39)
BASOPHILS # BLD AUTO: 0.03 THOUSANDS/ÂΜL (ref 0–0.1)
BASOPHILS NFR BLD AUTO: 1 % (ref 0–1)
BILIRUB SERPL-MCNC: 0.24 MG/DL (ref 0.2–1)
BUN SERPL-MCNC: 12 MG/DL (ref 5–25)
CALCIUM SERPL-MCNC: 8.5 MG/DL (ref 8.4–10.2)
CHLORIDE SERPL-SCNC: 108 MMOL/L (ref 96–108)
CO2 SERPL-SCNC: 24 MMOL/L (ref 21–32)
CREAT SERPL-MCNC: 0.78 MG/DL (ref 0.6–1.3)
EOSINOPHIL # BLD AUTO: 0.17 THOUSAND/ÂΜL (ref 0–0.61)
EOSINOPHIL NFR BLD AUTO: 3 % (ref 0–6)
ERYTHROCYTE [DISTWIDTH] IN BLOOD BY AUTOMATED COUNT: 11.7 % (ref 11.6–15.1)
GFR SERPL CREATININE-BSD FRML MDRD: 105 ML/MIN/1.73SQ M
GLUCOSE SERPL-MCNC: 96 MG/DL (ref 65–140)
HCT VFR BLD AUTO: 38.1 % (ref 34.8–46.1)
HGB BLD-MCNC: 13 G/DL (ref 11.5–15.4)
IMM GRANULOCYTES # BLD AUTO: 0.01 THOUSAND/UL (ref 0–0.2)
IMM GRANULOCYTES NFR BLD AUTO: 0 % (ref 0–2)
LIPASE SERPL-CCNC: 34 U/L (ref 11–82)
LYMPHOCYTES # BLD AUTO: 2.47 THOUSANDS/ÂΜL (ref 0.6–4.47)
LYMPHOCYTES NFR BLD AUTO: 41 % (ref 14–44)
MCH RBC QN AUTO: 31.9 PG (ref 26.8–34.3)
MCHC RBC AUTO-ENTMCNC: 34.1 G/DL (ref 31.4–37.4)
MCV RBC AUTO: 93 FL (ref 82–98)
MONOCYTES # BLD AUTO: 0.65 THOUSAND/ÂΜL (ref 0.17–1.22)
MONOCYTES NFR BLD AUTO: 11 % (ref 4–12)
NEUTROPHILS # BLD AUTO: 2.63 THOUSANDS/ÂΜL (ref 1.85–7.62)
NEUTS SEG NFR BLD AUTO: 44 % (ref 43–75)
NRBC BLD AUTO-RTO: 0 /100 WBCS
PLATELET # BLD AUTO: 264 THOUSANDS/UL (ref 149–390)
PMV BLD AUTO: 9 FL (ref 8.9–12.7)
POTASSIUM SERPL-SCNC: 4.1 MMOL/L (ref 3.5–5.3)
PROT SERPL-MCNC: 6.6 G/DL (ref 6.4–8.4)
RBC # BLD AUTO: 4.08 MILLION/UL (ref 3.81–5.12)
SODIUM SERPL-SCNC: 139 MMOL/L (ref 135–147)
WBC # BLD AUTO: 5.96 THOUSAND/UL (ref 4.31–10.16)

## 2025-06-14 PROCEDURE — 96374 THER/PROPH/DIAG INJ IV PUSH: CPT

## 2025-06-14 PROCEDURE — 83735 ASSAY OF MAGNESIUM: CPT | Performed by: EMERGENCY MEDICINE

## 2025-06-14 PROCEDURE — 83690 ASSAY OF LIPASE: CPT

## 2025-06-14 PROCEDURE — 93005 ELECTROCARDIOGRAM TRACING: CPT

## 2025-06-14 PROCEDURE — 80053 COMPREHEN METABOLIC PANEL: CPT

## 2025-06-14 PROCEDURE — 85025 COMPLETE CBC W/AUTO DIFF WBC: CPT

## 2025-06-14 PROCEDURE — 99284 EMERGENCY DEPT VISIT MOD MDM: CPT

## 2025-06-14 PROCEDURE — 84702 CHORIONIC GONADOTROPIN TEST: CPT

## 2025-06-14 PROCEDURE — 36415 COLL VENOUS BLD VENIPUNCTURE: CPT

## 2025-06-14 PROCEDURE — 99284 EMERGENCY DEPT VISIT MOD MDM: CPT | Performed by: EMERGENCY MEDICINE

## 2025-06-14 RX ORDER — ONDANSETRON 2 MG/ML
4 INJECTION INTRAMUSCULAR; INTRAVENOUS ONCE
Status: COMPLETED | OUTPATIENT
Start: 2025-06-14 | End: 2025-06-14

## 2025-06-14 RX ADMIN — ONDANSETRON 4 MG: 2 INJECTION INTRAMUSCULAR; INTRAVENOUS at 23:36

## 2025-06-14 NOTE — Clinical Note
Socorro Wen was seen and treated in our emergency department on 6/14/2025.                Diagnosis:     Socorro  may return to work on return date.    She may return on this date: 06/17/2025         If you have any questions or concerns, please don't hesitate to call.      Melonie Cedeno MD    ______________________________           _______________          _______________  Hospital Representative                              Date                                Time

## 2025-06-15 VITALS
SYSTOLIC BLOOD PRESSURE: 103 MMHG | HEART RATE: 58 BPM | BODY MASS INDEX: 29.83 KG/M2 | TEMPERATURE: 98.3 F | RESPIRATION RATE: 18 BRPM | WEIGHT: 173.8 LBS | DIASTOLIC BLOOD PRESSURE: 66 MMHG | OXYGEN SATURATION: 97 %

## 2025-06-15 LAB
ATRIAL RATE: 84 BPM
B-HCG SERPL-ACNC: <0.6 MIU/ML (ref 0–5)
MAGNESIUM SERPL-MCNC: 1.9 MG/DL (ref 1.9–2.7)
P AXIS: 53 DEGREES
PR INTERVAL: 152 MS
QRS AXIS: 11 DEGREES
QRSD INTERVAL: 70 MS
QT INTERVAL: 372 MS
QTC INTERVAL: 439 MS
T WAVE AXIS: 32 DEGREES
VENTRICULAR RATE: 84 BPM

## 2025-06-15 PROCEDURE — 93010 ELECTROCARDIOGRAM REPORT: CPT | Performed by: INTERNAL MEDICINE

## 2025-06-15 RX ORDER — IBUPROFEN 600 MG/1
600 TABLET, FILM COATED ORAL ONCE
Status: COMPLETED | OUTPATIENT
Start: 2025-06-15 | End: 2025-06-15

## 2025-06-15 RX ORDER — ACETAMINOPHEN 325 MG/1
650 TABLET ORAL ONCE
Status: COMPLETED | OUTPATIENT
Start: 2025-06-15 | End: 2025-06-15

## 2025-06-15 RX ADMIN — IBUPROFEN 600 MG: 600 TABLET ORAL at 00:33

## 2025-06-15 RX ADMIN — ACETAMINOPHEN 650 MG: 325 TABLET, FILM COATED ORAL at 00:33

## 2025-06-20 NOTE — ED PROVIDER NOTES
Time reflects when diagnosis was documented in both MDM as applicable and the Disposition within this note       Time User Action Codes Description Comment    6/15/2025  1:04 AM Melonie Cedeno Add [A08.4] Viral gastroenteritis           ED Disposition       ED Disposition   Discharge    Condition   Stable    Date/Time   Sun Bipin 15, 2025  1:04 AM    Comment   Socorro Wen discharge to home/self care.                   Assessment & Plan       Medical Decision Making  Healthy 24-year-old female with a few days of watery diarrhea.  Took Pepto this morning.  Had black diarrhea afterward which was alarming to her.  Also was feeling lightheaded on standing today.  Some intermittent crampy abdominal pain.  No dysuria or hematuria.  Nausea but no vomiting.  Also complaining of headache.  Patient is well-appearing on arrival with normal vitals.  Soft nontender abdomen.  No CVA tenderness.  Clinically well-hydrated.  Screening labs notable for normal CBC CMP lipase, negative hCG, normal electrolytes.  Patient given Zofran Tylenol and ibuprofen.  Feeling somewhat improved.  Suspect viral gastroenteritis based on reassuring workup and lack of recent travel or blood in her stool.  Does not require stool testing.  Stable for discharge.    Amount and/or Complexity of Data Reviewed  Labs: ordered.    Risk  OTC drugs.  Prescription drug management.             Medications   ondansetron (ZOFRAN) injection 4 mg (4 mg Intravenous Given 6/14/25 2336)   ibuprofen (MOTRIN) tablet 600 mg (600 mg Oral Given 6/15/25 0033)   acetaminophen (TYLENOL) tablet 650 mg (650 mg Oral Given 6/15/25 0033)       ED Risk Strat Scores                    No data recorded        SBIRT 20yo+      Flowsheet Row Most Recent Value   Initial Alcohol Screen: US AUDIT-C     1. How often do you have a drink containing alcohol? 0 Filed at: 06/14/2025 6781   2. How many drinks containing alcohol do you have on a typical day you are drinking?  0 Filed at: 06/14/2025  2259   3b. FEMALE Any Age, or MALE 65+: How often do you have 4 or more drinks on one occassion? 0 Filed at: 06/14/2025 2259   Audit-C Score 0 Filed at: 06/14/2025 2259   INDRA: How many times in the past year have you...    Used an illegal drug or used a prescription medication for non-medical reasons? Never Filed at: 06/14/2025 2259                            History of Present Illness       Chief Complaint   Patient presents with    Diarrhea     Patient c/o diarrhea that started Thursday. Pt reports lightheadedness started this morning. Attempted Pepto this AM with no relief. Pt became concerned when she saw black in the toilet. No Tylenol or Ibuprofen prior to arrival. Denies CP and SOB.        Past Medical History[1]   Past Surgical History[2]   Family History[3]   Social History[4]   E-Cigarette/Vaping    E-Cigarette Use Former User     Quit Date 6/15/21       E-Cigarette/Vaping Substances    Nicotine Yes     THC No     CBD No     Flavoring No     Other No     Unknown No       I have reviewed and agree with the history as documented.     Healthy 24-year-old female with a few days of watery diarrhea.  Took Pepto this morning.  Had black diarrhea afterward which was alarming to her.  Also was feeling lightheaded on standing today.  Some intermittent crampy abdominal pain.  No dysuria or hematuria.  Nausea but no vomiting.  Also complaining of headache.         Review of Systems   All other systems reviewed and are negative.          Objective       ED Triage Vitals   Temperature Pulse Blood Pressure Respirations SpO2 Patient Position - Orthostatic VS   06/14/25 2257 06/14/25 2257 06/14/25 2257 06/14/25 2257 06/14/25 2257 06/14/25 2257   98.3 °F (36.8 °C) 84 133/83 20 97 % Lying      Temp Source Heart Rate Source BP Location FiO2 (%) Pain Score    06/14/25 2257 06/14/25 2257 06/14/25 2257 -- 06/14/25 2330    Oral Monitor Right arm  7      Vitals      Date and Time Temp Pulse SpO2 Resp BP Pain Score FACES Pain  Rating User   06/15/25 0100 -- 58 97 % 18 103/66 -- --    06/15/25 0033 -- -- -- -- -- 7 --    06/15/25 0000 -- 66 99 % 21 106/66 -- --    06/14/25 2330 -- 71 99 % 20 117/75 7 --    06/14/25 2257 98.3 °F (36.8 °C) 84 97 % 20 133/83 -- -- DG            Physical Exam  Constitutional:       General: She is not in acute distress.  HENT:      Head: Normocephalic.      Mouth/Throat:      Mouth: Mucous membranes are moist.     Cardiovascular:      Rate and Rhythm: Normal rate and regular rhythm.      Heart sounds: Normal heart sounds.   Pulmonary:      Effort: Pulmonary effort is normal.      Breath sounds: Normal breath sounds.   Abdominal:      Palpations: Abdomen is soft.      Tenderness: There is no abdominal tenderness. There is no right CVA tenderness or left CVA tenderness.     Skin:     General: Skin is warm and dry.     Neurological:      General: No focal deficit present.      Mental Status: She is alert and oriented to person, place, and time.     Psychiatric:         Mood and Affect: Mood normal.         Behavior: Behavior normal.         Results Reviewed       Procedure Component Value Units Date/Time    Magnesium [[id number]]  (Normal) Collected: 06/14/25 2333    Lab Status: Final result Specimen: Blood from Arm, Left Updated: 06/15/25 0016     Magnesium 1.9 mg/dL     hCG, quantitative, pregnancy [[id number]]  (Normal) Collected: 06/14/25 2333    Lab Status: Final result Specimen: Blood from Arm, Left Updated: 06/15/25 0002     HCG, Quant <0.6 mIU/mL     Narrative:       Expected Ranges:    HCG results between 5.0 and 25.0 mIU/mL may be indicative of early pregnancy but should be interpreted in light of the total clinical presentation.    HCG can rise to detectable levels in rickey and post menopausal women (0-11.6 mIU/mL).     Approximate               Approximate HCG  Gestation age          Concentration ( mIU/mL)  _____________          ______________________   Weeks                      HCG  values  0.2-1                       5-50  1-2                           2-3                         100-5000  3-4                         500-56359  4-5                         1000-07373  5-6                         [id number]  6-8                         [id number]  8-12                        [id number]      Comprehensive metabolic panel [[id number]] Collected: 06/14/25 2333    Lab Status: Final result Specimen: Blood from Arm, Left Updated: 06/14/25 2357     Sodium 139 mmol/L      Potassium 4.1 mmol/L      Chloride 108 mmol/L      CO2 24 mmol/L      ANION GAP 7 mmol/L      BUN 12 mg/dL      Creatinine 0.78 mg/dL      Glucose 96 mg/dL      Calcium 8.5 mg/dL      AST 15 U/L      ALT 9 U/L      Alkaline Phosphatase 50 U/L      Total Protein 6.6 g/dL      Albumin 4.2 g/dL      Total Bilirubin 0.24 mg/dL      eGFR 105 ml/min/1.73sq m     Narrative:      National Kidney Disease Foundation guidelines for Chronic Kidney Disease (CKD):     Stage 1 with normal or high GFR (GFR > 90 mL/min/1.73 square meters)    Stage 2 Mild CKD (GFR = 60-89 mL/min/1.73 square meters)    Stage 3A Moderate CKD (GFR = 45-59 mL/min/1.73 square meters)    Stage 3B Moderate CKD (GFR = 30-44 mL/min/1.73 square meters)    Stage 4 Severe CKD (GFR = 15-29 mL/min/1.73 square meters)    Stage 5 End Stage CKD (GFR <15 mL/min/1.73 square meters)  Note: GFR calculation is accurate only with a steady state creatinine    Lipase [[id number]]  (Normal) Collected: 06/14/25 2333    Lab Status: Final result Specimen: Blood from Arm, Left Updated: 06/14/25 2357     Lipase 34 u/L     CBC and differential [[id number]] Collected: 06/14/25 2333    Lab Status: Final result Specimen: Blood from Arm, Left Updated: 06/14/25 2338     WBC 5.96 Thousand/uL      RBC 4.08 Million/uL      Hemoglobin 13.0 g/dL      Hematocrit 38.1 %      MCV 93 fL      MCH 31.9 pg      MCHC 34.1 g/dL      RDW 11.7 %      MPV 9.0 fL      Platelets 264 Thousands/uL      nRBC 0 /100  WBCs      Segmented % 44 %      Immature Grans % 0 %      Lymphocytes % 41 %      Monocytes % 11 %      Eosinophils Relative 3 %      Basophils Relative 1 %      Absolute Neutrophils 2.63 Thousands/µL      Absolute Immature Grans 0.01 Thousand/uL      Absolute Lymphocytes 2.47 Thousands/µL      Absolute Monocytes 0.65 Thousand/µL      Eosinophils Absolute 0.17 Thousand/µL      Basophils Absolute 0.03 Thousands/µL             No orders to display       Procedures    ED Medication and Procedure Management   Prior to Admission Medications   Prescriptions Last Dose Informant Patient Reported? Taking?   Norethin-Eth Estrad-Fe Biphas (Lo Loestrin Fe) 1 MG-10 MCG / 10 MCG TABS 6/14/2025  No Yes   Sig: Take 1 tablet by mouth in the morning   albuterol (ProAir HFA) 90 mcg/act inhaler More than a month  No No   Sig: Inhale 2 puffs every 4 (four) hours as needed for wheezing or shortness of breath   cyclobenzaprine (FLEXERIL) 5 mg tablet Not Taking  No No   Sig: Take 1 tablet (5 mg total) by mouth daily at bedtime   Patient not taking: Reported on 6/14/2025   triamcinolone (KENALOG) 0.1 % ointment 6/13/2025 Self No Yes   Sig: Apply topically 2 (two) times a day      Facility-Administered Medications: None     Discharge Medication List as of 6/15/2025  1:10 AM        CONTINUE these medications which have NOT CHANGED    Details   Norethin-Eth Estrad-Fe Biphas (Lo Loestrin Fe) 1 MG-10 MCG / 10 MCG TABS Take 1 tablet by mouth in the morning, Starting Tue 5/6/2025, Normal      triamcinolone (KENALOG) 0.1 % ointment Apply topically 2 (two) times a day, Starting Wed 8/23/2023, Normal      albuterol (ProAir HFA) 90 mcg/act inhaler Inhale 2 puffs every 4 (four) hours as needed for wheezing or shortness of breath, Starting Wed 7/17/2024, Normal      cyclobenzaprine (FLEXERIL) 5 mg tablet Take 1 tablet (5 mg total) by mouth daily at bedtime, Starting Wed 8/21/2024, Normal           No discharge procedures on file.  ED SEPSIS  DOCUMENTATION   Time reflects when diagnosis was documented in both MDM as applicable and the Disposition within this note       Time User Action Codes Description Comment    6/15/2025  1:04 AM Melonie Cedeno Add [A08.4] Viral gastroenteritis                    [1]   Past Medical History:  Diagnosis Date    Allergic rhinitis     Eczema     Kidney stone      (spontaneous vaginal delivery) 2022    Varicella     immunized   [2]   Past Surgical History:  Procedure Laterality Date    APPENDECTOMY  23    APPENDECTOMY LAPAROSCOPIC N/A 2022    Procedure: APPENDECTOMY LAPAROSCOPIC, possible open;  Surgeon: Lauryn Ullrich, DO;  Location: [facility];  Service: General    SKIN BIOPSY      WISDOM TOOTH EXTRACTION     [3]   Family History  Problem Relation Name Age of Onset    Melanoma Mother Rosemary     No Known Problems Father      No Known Problems Brother      No Known Problems Brother      Mental illness Neg Hx      Substance Abuse Neg Hx     [4]   Social History  Tobacco Use    Smoking status: Former     Current packs/day: 0.00     Average packs/day: 0.3 packs/day for 3.0 years (0.8 ttl pk-yrs)     Types: Cigarettes     Start date:      Quit date:      Years since quittin.4    Smokeless tobacco: Never    Tobacco comments:     vapes   Vaping Use    Vaping status: Former    Quit date: 6/15/2021    Substances: Nicotine   Substance Use Topics    Alcohol use: Yes     Comment: 1-2 drinks on a rare occasion    Drug use: No        Melonie Cedeno MD  [phone]

## 2025-07-03 ENCOUNTER — OFFICE VISIT (OUTPATIENT)
Dept: DERMATOLOGY | Facility: CLINIC | Age: 25
End: 2025-07-03
Payer: COMMERCIAL

## 2025-07-03 VITALS — HEIGHT: 64 IN | TEMPERATURE: 97.5 F | WEIGHT: 139 LBS | BODY MASS INDEX: 23.73 KG/M2

## 2025-07-03 DIAGNOSIS — Z86.018 HISTORY OF DYSPLASTIC NEVUS: ICD-10-CM

## 2025-07-03 DIAGNOSIS — L81.4 LENTIGINES: ICD-10-CM

## 2025-07-03 DIAGNOSIS — D22.9 MULTIPLE MELANOCYTIC NEVI: Primary | ICD-10-CM

## 2025-07-03 PROCEDURE — 99213 OFFICE O/P EST LOW 20 MIN: CPT

## 2025-07-03 NOTE — PATIENT INSTRUCTIONS
History DYSPLASTIC NEVUS          Assessment and Plan:  Based on a thorough discussion of this condition and the management approach to it (including a comprehensive discussion of the known risks, side effects and potential benefits of treatment), the patient (family) agrees to implement the following specific plan:  Discussed using SPF  Advised patient to monitor for recurrence  Discussed about getting Silicone scar patches for the scar if desired  6 month skin checks     What is a dysplastic nevus?  The term dysplastic nevus is best used for a nevus with a specific microscopic appearance. Only a minority of clinically atypical nevi fulfil microscopic criteria for dysplastic nevus. Many histologically dysplastic nevi are clinically banale (eg, small in size, and uniform in color and in structure)     Histological dysplasia may be mild, moderate or severe. Distinct pathological criteria of a dysplastic naevus are listed here.  The dysplastic nevus may be a junctional nevus (when the melanocytes are found at the epidermodermal junction) or a compound naevus (when the melanocytes are found at the epidermodermal junction and within the dermis).  The naevus cells form a row along the dermoepidermal junction (this is reported as lentiginous proliferation), with or without naevus cells in nests (also called theques).  These theques are often irregular in size and shape and may 'bridge' or join together.  The cells may be spindle-shaped (elongated) or epithelioid (broad, resembling epidermal keratinocytes).  There may be cytological atypia (cells that are smaller or larger than usual).   There may be fibrosis or scarring in the dermis.  Inflammatory cells may infiltrate the lesion.  Associated blood vessels may be increased in number or enlarged.     Melanoma may arise within a dysplastic nevus, within a non-dysplastic, normal, melanocytic nevus, or more often, in normal-appearing skin.     Dysplastic moles are moles  that fit the ABCDE rules of melanoma but are not identified as melanomas when examined under the microscope.  They may indicate an increased risk of melanoma in that person. If there is a family history of melanoma, most experts agree that the person may be at an increased risk for developing a melanoma.  Experts still do not agree on what dysplastic moles mean in patients without a personal or family history of melanoma.  Dysplastic moles are usually larger than common moles and have different colors within it with irregular borders. The appearance can be very similar to a melanoma. Biopsies of dysplastic moles may show abnormalities which are different from a regular mole.       MELANOCYTIC NEVI  -Relevant exam: Scattered over the trunk/extremities are homogenously pigmented brown macules and papules. ELM performed and without concerning findings. No outliers unless otherwise noted in today's note  - Exam and clinical history consistent with melanocytic nevi  - Counseled to return to clinic prior to scheduled appointment should any of these lesions change or should any new lesions of concern arise  - Counseled on use of sun protection daily. Reviewed latest FDA sunscreen guidelines, including use of broad spectrum (UVA and UVB blocking) sunscreen or sun protective clothing with SPF 30-50 every 2-3 hours and reapplied after exposure to water    LENTIGINES  OTHER SKIN CHANGES DUE TO CHRONIC EXPOSURE TO NONIONIZING RADIATION  - Relevant exam: Over sun exposed areas are brown macules. ELM performed and without concerning findings.  - Exam and clinical history consistent with lentigines.  - Counseled to return to clinic prior to scheduled appointment should any of these lesions change or should any new lesions of concern arise.  - Recommended use of sunscreen as above and below.

## 2025-07-03 NOTE — PROGRESS NOTES
"[facility] Note     Patient Name: Socorro Wen  Encounter Date: 7/3/25       Have you been cared for by a [facility] Dermatologist in the last 3 years and, if so, which description applies to you? Yes. I have been here within the last 3 years, and my medical history has NOT changed since that time. I am of child-bearing potential.     REVIEW OF SYSTEMS:  Have you recently had or currently have any of the following? No changes in my recent health.   PAST MEDICAL HISTORY:  Have you personally ever had or currently have any of the following?  If \"YES,\" then please provide more detail. No changes in my medical history.   HISTORY OF IMMUNOSUPPRESSION: Do you have a history of any of the following:  Systemic Immunosuppression such as Diabetes, Biologic or Immunotherapy, Chemotherapy, Organ Transplantation, Bone Marrow Transplantation or Prednisone?  No     Answering \"YES\" requires the addition of the dotphrase \"IMMUNOSUPPRESSED\" as the first diagnosis of the patient's visit.   FAMILY HISTORY:  Any \"first degree relatives\" (parent, brother, sister, or child) with the following?    No changes in my family's known health.   PATIENT EXPERIENCE:    Do you want the Dermatologist to perform a COMPLETE skin exam today including a clinical examination under the \"bra and underwear\" areas?  Yes  If necessary, do we have your permission to call and leave a detailed message on your Preferred Phone number that includes your specific medical information?  Yes      Allergies[1] Current Medications[2]        Patient present for skin check, no spots of concern.         Whom besides the patient is providing clinical information about today's encounter?   NO ADDITIONAL HISTORIAN (patient alone provided history)    Physical Exam and Assessment/Plan by Diagnosis:    History DYSPLASTIC NEVUS     Physical Exam:  Anatomic Location Affected:  Right breast   Morphological Description:  pink slightly hypertrophic scar     " "  Additional History of Present Condition: patient has moderate to severely atypical nevus Excised 04/17/24 w/ clear margins.     Assessment and Plan:  Based on a thorough discussion of this condition and the management approach to it (including a comprehensive discussion of the known risks, side effects and potential benefits of treatment), the patient (family) agrees to implement the following specific plan:  Discussed using SPF 30 or higher or sun protective clothing.   Advised patient to monitor for recurrence.  Discussed referral for laser therapy if interested to help with pinkness, patient defers.   6 month skin checks         MELANOCYTIC NEVI (\"Moles\")    Physical Exam:  Anatomic Location Affected:   Mostly on sun-exposed areas of the face, trunk and extremities  Morphological Description:  Scattered, 1-4mm round to ovoid, symmetrical-appearing, even bordered, skin colored to dark brown macules/papules      Additional History of Present Condition:  Present on exam. Denies any pain, itch, bleeding. Present for years. Denies actively changing or growing moles.     Assessment and Plan:  Based on a thorough discussion of this condition and the management approach to it (including a comprehensive discussion of the known risks, side effects and potential benefits of treatment), the patient (family) agrees to implement the following specific plan:  Reassured benign.   Monitor for changes. ABCDE's of melanoma handout provided.   Practice sun protection. Apply broad spectrum (UVA and UVB) sunscreen, SPF 30 or higher every 2 hours. Wear sun protective clothing, hats, and sunglasses.          LENTIGO    Physical Exam:  Anatomic Location Affected:  sun exposed areas of face  Morphological Description:  multiple scattered tan to brown evenly pigmented macules      Additional History of Present Condition:  Present on exam.     Assessment and Plan:  Based on a thorough discussion of this condition and the management " approach to it (including a comprehensive discussion of the known risks, side effects and potential benefits of treatment), the patient (family) agrees to implement the following specific plan:  Reassured benign.   Practice sun protection. Apply broad spectrum (UVA and UVB) sunscreen, SPF 30 or higher every 2 hours. Wear sun protective clothing, hats, and sunglasses.     Follow-up: 6 months for skin exam.     Scribe Attestation      I,:  Whitney Bryson am acting as a scribe while in the presence of the attending physician.:       I,:  Treasure Sylvester PA-C personally performed the services described in this documentation    as scribed in my presence.:                  [1] No Known Allergies  [2]   Current Outpatient Medications:     albuterol (ProAir HFA) 90 mcg/act inhaler, Inhale 2 puffs every 4 (four) hours as needed for wheezing or shortness of breath, Disp: 18 g, Rfl: 0    Norethin-Eth Estrad-Fe Biphas (Lo Loestrin Fe) 1 MG-10 MCG / 10 MCG TABS, Take 1 tablet by mouth in the morning, Disp: 84 tablet, Rfl: 3    triamcinolone (KENALOG) 0.1 % ointment, Apply topically 2 (two) times a day, Disp: 30 g, Rfl: 3    cyclobenzaprine (FLEXERIL) 5 mg tablet, Take 1 tablet (5 mg total) by mouth daily at bedtime (Patient not taking: Reported on 7/3/2025), Disp: 15 tablet, Rfl: 0

## 2025-07-06 DIAGNOSIS — N94.6 DYSMENORRHEA: ICD-10-CM

## 2025-07-07 RX ORDER — NORETHINDRONE ACETATE AND ETHINYL ESTRADIOL, ETHINYL ESTRADIOL AND FERROUS FUMARATE 1MG-10(24)
1 KIT ORAL DAILY
Qty: 84 TABLET | Refills: 0 | Status: SHIPPED | OUTPATIENT
Start: 2025-07-07

## (undated) DEVICE — GLOVE SRG BIOGEL 6

## (undated) DEVICE — ENDOPATH PNEUMONEEDLE INSUFFLATION NEEDLES WITH LUER LOCK CONNECTORS 120MM: Brand: ENDOPATH

## (undated) DEVICE — THE ECHELON, ECHELON ENDOPATH™ AND ECHELON FLEX™ FAMILIES OF ENDOSCOPIC LINEAR CUTTERS AND RELOADS ARE STERILE, SINGLE PATIENT USE INSTRUMENTS THAT SIMULTANEOUSLY CUT AND STAPLE TISSUE. THERE ARE SIX STAGGERED ROWS OF STAPLES, THREE ON EITHER SIDE OF THE CUT LINE. THE 45 MM INSTRUMENTS HAVE A STAPLE LINE THATIS APPROXIMATELY 45 MM LONG AND A CUT LINE THAT IS APPROXIMATELY 42 MM LONG. THE SHAFT CAN ROTATE FREELY IN BOTH DIRECTIONS AND AN ARTICULATION MECHANISM ON ARTICULATING INSTRUMENTS ENABLES BENDING THE DISTAL PORTIONOF THE SHAFT TO FACILITATE LATERAL ACCESS OF THE OPERATIVE SITE.THE INSTRUMENTS ARE SHIPPED WITHOUT A RELOAD AND MUST BE LOADED PRIOR TO USE. A STAPLE RETAINING CAP ON THE RELOAD PROTECTS THE STAPLE LEG POINTS DURING SHIPPING AND TRANSPORTATION. THE INSTRUMENTS’ LOCK-OUT FEATURE IS DESIGNED TO PREVENT A USED RELOAD FROM BEING REFIRED.: Brand: ECHELON ENDOPATH

## (undated) DEVICE — PACK PBDS LAP CHOLE RF

## (undated) DEVICE — LIGAMAX 5 MM ENDOSCOPIC MULTIPLE CLIP APPLIER: Brand: LIGAMAX

## (undated) DEVICE — TROCAR: Brand: KII FIOS FIRST ENTRY

## (undated) DEVICE — SYRINGE 10ML LL

## (undated) DEVICE — THE ECHELON FLEX POWERED PLUS ARTICULATING ENDOSCOPIC LINEAR CUTTERS ARE STERILE, SINGLE PATIENT USE INSTRUMENTS THAT SIMULTANEOUSLYCUT AND STAPLE TISSUE. THERE ARE SIX STAGGERED ROWS OF STAPLES, THREE ON EITHER SIDE OF THE CUT LINE. THE ECHELON FLEX 45 POWERED PLUSINSTRUMENTS HAVE A STAPLE LINE THAT IS APPROXIMATELY 45 MM LONG AND A CUT LINE THAT IS APPROXIMATELY 42 MM LONG. THE SHAFT CAN ROTATE FREELYIN BOTH DIRECTIONS AND AN ARTICULATION MECHANISM ENABLES THE DISTAL PORTION OF THE SHAFT TO PIVOT TO FACILITATE LATERAL ACCESS TO THE OPERATIVESITE.THE INSTRUMENTS ARE PACKAGED WITH A PRIMARY LITHIUM BATTERY PACK THAT MUST BE INSTALLED PRIOR TO USE. THERE ARE SPECIFIC REQUIREMENTS FORDISPOSING OF THE BATTERY PACK. REFER TO THE BATTERY PACK DISPOSAL SECTION.THE INSTRUMENTS ARE PACKAGED WITHOUT A RELOAD AND MUST BE LOADED PRIOR TO USE. A STAPLE RETAINING CAP ON THE RELOAD PROTECTS THE STAPLE LEGPOINTS DURING SHIPPING AND TRANSPORTATION. THE INSTRUMENTS’ LOCK-OUT FEATURE IS DESIGNED TO PREVENT A USED OR IMPROPERLY INSTALLED RELOADFROM BEING REFIRED OR AN INSTRUMENT FROM BEING FIRED WITHOUT A RELOAD.: Brand: ECHELON FLEX

## (undated) DEVICE — PENCIL ELECTROSURG E-Z CLEAN -0035H

## (undated) DEVICE — GLOVE INDICATOR UNDERGLOVE SZ 6 BLUE

## (undated) DEVICE — INTENDED FOR TISSUE SEPARATION, AND OTHER PROCEDURES THAT REQUIRE A SHARP SURGICAL BLADE TO PUNCTURE OR CUT.: Brand: BARD-PARKER SAFETY BLADES SIZE 11, STERILE

## (undated) DEVICE — SUT VICRYL PLUS 0 UR-6 27IN VCP603H

## (undated) DEVICE — SUT MONOCRYL 4-0 PS-2 18 IN Y496G

## (undated) DEVICE — ADHESIVE SKIN HIGH VISCOSITY EXOFIN 1ML

## (undated) DEVICE — TROCARS: Brand: KII® BALLOON BLUNT TIP SYSTEM

## (undated) DEVICE — TRAY FOLEY 16FR URIMETER SURESTEP

## (undated) DEVICE — 3000CC GUARDIAN II: Brand: GUARDIAN